# Patient Record
Sex: MALE | Race: WHITE | NOT HISPANIC OR LATINO
[De-identification: names, ages, dates, MRNs, and addresses within clinical notes are randomized per-mention and may not be internally consistent; named-entity substitution may affect disease eponyms.]

---

## 2018-08-24 PROBLEM — Z00.00 ENCOUNTER FOR PREVENTIVE HEALTH EXAMINATION: Status: ACTIVE | Noted: 2018-08-24

## 2018-08-28 ENCOUNTER — LABORATORY RESULT (OUTPATIENT)
Age: 73
End: 2018-08-28

## 2018-08-29 ENCOUNTER — APPOINTMENT (OUTPATIENT)
Dept: CARDIOLOGY | Facility: CLINIC | Age: 73
End: 2018-08-29
Payer: MEDICARE

## 2018-08-29 ENCOUNTER — NON-APPOINTMENT (OUTPATIENT)
Age: 73
End: 2018-08-29

## 2018-08-29 VITALS
DIASTOLIC BLOOD PRESSURE: 82 MMHG | WEIGHT: 212 LBS | HEIGHT: 67 IN | SYSTOLIC BLOOD PRESSURE: 129 MMHG | RESPIRATION RATE: 15 BRPM | BODY MASS INDEX: 33.27 KG/M2 | HEART RATE: 74 BPM

## 2018-08-29 DIAGNOSIS — Z86.39 PERSONAL HISTORY OF OTHER ENDOCRINE, NUTRITIONAL AND METABOLIC DISEASE: ICD-10-CM

## 2018-08-29 PROCEDURE — 93000 ELECTROCARDIOGRAM COMPLETE: CPT

## 2018-08-29 PROCEDURE — 99204 OFFICE O/P NEW MOD 45 MIN: CPT

## 2018-08-31 PROBLEM — Z86.39 HISTORY OF HYPERLIPIDEMIA: Status: RESOLVED | Noted: 2018-08-31 | Resolved: 2018-08-31

## 2018-10-02 ENCOUNTER — APPOINTMENT (OUTPATIENT)
Dept: CARDIOLOGY | Facility: CLINIC | Age: 73
End: 2018-10-02
Payer: MEDICARE

## 2018-10-02 VITALS
HEART RATE: 74 BPM | WEIGHT: 217 LBS | BODY MASS INDEX: 34.06 KG/M2 | HEIGHT: 67 IN | DIASTOLIC BLOOD PRESSURE: 78 MMHG | RESPIRATION RATE: 15 BRPM | SYSTOLIC BLOOD PRESSURE: 124 MMHG

## 2018-10-02 PROCEDURE — 99214 OFFICE O/P EST MOD 30 MIN: CPT

## 2018-10-02 RX ORDER — FAMOTIDINE 20 MG/1
20 TABLET, FILM COATED ORAL DAILY
Refills: 0 | Status: ACTIVE | COMMUNITY
Start: 2018-10-02

## 2018-10-02 RX ORDER — POTASSIUM CHLORIDE 1500 MG/1
20 TABLET, EXTENDED RELEASE ORAL DAILY
Refills: 0 | Status: ACTIVE | COMMUNITY
Start: 2018-10-02

## 2018-10-02 RX ORDER — TORSEMIDE 20 MG/1
20 TABLET ORAL TWICE DAILY
Refills: 0 | Status: ACTIVE | COMMUNITY
Start: 2018-10-02

## 2018-10-02 RX ORDER — DOCUSATE SODIUM 100 MG/1
100 CAPSULE, LIQUID FILLED ORAL TWICE DAILY
Refills: 0 | Status: ACTIVE | COMMUNITY
Start: 2018-10-02

## 2018-10-02 RX ORDER — COLCHICINE 0.6 MG/1
0.6 CAPSULE ORAL DAILY
Refills: 0 | Status: ACTIVE | COMMUNITY
Start: 2018-10-02

## 2018-10-02 RX ORDER — CARVEDILOL 3.12 MG/1
3.12 TABLET, FILM COATED ORAL TWICE DAILY
Refills: 0 | Status: ACTIVE | COMMUNITY
Start: 2018-10-02

## 2018-10-02 RX ORDER — ASPIRIN ENTERIC COATED TABLETS 81 MG 81 MG/1
81 TABLET, DELAYED RELEASE ORAL DAILY
Refills: 0 | Status: ACTIVE | COMMUNITY
Start: 2018-10-02

## 2018-10-02 RX ORDER — ISOSORBIDE MONONITRATE 30 MG/1
30 TABLET, EXTENDED RELEASE ORAL DAILY
Refills: 0 | Status: ACTIVE | COMMUNITY
Start: 2018-10-02

## 2018-10-02 RX ORDER — LEVOTHYROXINE SODIUM 0.03 MG/1
25 TABLET ORAL DAILY
Refills: 0 | Status: ACTIVE | COMMUNITY
Start: 2018-10-02

## 2018-10-02 RX ORDER — HYDRALAZINE HYDROCHLORIDE 10 MG/1
10 TABLET ORAL EVERY 6 HOURS
Refills: 0 | Status: ACTIVE | COMMUNITY
Start: 2018-10-02

## 2018-10-19 ENCOUNTER — APPOINTMENT (OUTPATIENT)
Dept: CARDIOLOGY | Facility: CLINIC | Age: 73
End: 2018-10-19
Payer: MEDICARE

## 2018-10-19 VITALS
BODY MASS INDEX: 32.96 KG/M2 | WEIGHT: 210 LBS | SYSTOLIC BLOOD PRESSURE: 116 MMHG | HEIGHT: 67 IN | RESPIRATION RATE: 16 BRPM | DIASTOLIC BLOOD PRESSURE: 70 MMHG | HEART RATE: 64 BPM

## 2018-10-19 DIAGNOSIS — E78.5 HYPERLIPIDEMIA, UNSPECIFIED: ICD-10-CM

## 2018-10-19 DIAGNOSIS — R01.1 CARDIAC MURMUR, UNSPECIFIED: ICD-10-CM

## 2018-10-19 DIAGNOSIS — Z86.79 OTHER SPECIFIED POSTPROCEDURAL STATES: ICD-10-CM

## 2018-10-19 DIAGNOSIS — I42.9 CARDIOMYOPATHY, UNSPECIFIED: ICD-10-CM

## 2018-10-19 DIAGNOSIS — Z98.890 OTHER SPECIFIED POSTPROCEDURAL STATES: ICD-10-CM

## 2018-10-19 DIAGNOSIS — Z86.79 PERSONAL HISTORY OF OTHER DISEASES OF THE CIRCULATORY SYSTEM: ICD-10-CM

## 2018-10-19 DIAGNOSIS — I48.91 UNSPECIFIED ATRIAL FIBRILLATION: ICD-10-CM

## 2018-10-19 DIAGNOSIS — Z95.810 PRESENCE OF AUTOMATIC (IMPLANTABLE) CARDIAC DEFIBRILLATOR: ICD-10-CM

## 2018-10-19 DIAGNOSIS — I25.10 ATHEROSCLEROTIC HEART DISEASE OF NATIVE CORONARY ARTERY W/OUT ANGINA PECTORIS: ICD-10-CM

## 2018-10-19 DIAGNOSIS — I10 ESSENTIAL (PRIMARY) HYPERTENSION: ICD-10-CM

## 2018-10-19 DIAGNOSIS — I73.9 PERIPHERAL VASCULAR DISEASE, UNSPECIFIED: ICD-10-CM

## 2018-10-19 PROCEDURE — 99214 OFFICE O/P EST MOD 30 MIN: CPT

## 2018-10-19 PROCEDURE — 93306 TTE W/DOPPLER COMPLETE: CPT

## 2018-10-19 PROCEDURE — 93922 UPR/L XTREMITY ART 2 LEVELS: CPT

## 2018-11-01 PROBLEM — Z86.79 HISTORY OF CONGESTIVE HEART FAILURE: Status: ACTIVE | Noted: 2018-08-31

## 2018-11-01 PROBLEM — R01.1 HEART MURMUR: Status: ACTIVE | Noted: 2018-08-31

## 2018-11-01 PROBLEM — I42.9 CARDIOMYOPATHY: Status: ACTIVE | Noted: 2018-08-31

## 2018-11-01 PROBLEM — I48.91 ATRIAL FIBRILLATION: Status: ACTIVE | Noted: 2018-08-31

## 2018-11-01 PROBLEM — I25.10 CORONARY ARTERY DISEASE: Status: ACTIVE | Noted: 2018-08-31

## 2018-12-11 ENCOUNTER — APPOINTMENT (OUTPATIENT)
Dept: CARDIOLOGY | Facility: CLINIC | Age: 73
End: 2018-12-11

## 2019-06-16 PROBLEM — I73.9 CLAUDICATION: Status: ACTIVE | Noted: 2018-10-19

## 2020-06-12 ENCOUNTER — INPATIENT (INPATIENT)
Facility: HOSPITAL | Age: 75
LOS: 13 days | Discharge: NURSING, REHAB OR LONG-TERM CARE | DRG: 208 | End: 2020-06-26
Attending: HOSPITALIST | Admitting: INTERNAL MEDICINE
Payer: COMMERCIAL

## 2020-06-12 VITALS — RESPIRATION RATE: 25 BRPM | OXYGEN SATURATION: 89 %

## 2020-06-12 DIAGNOSIS — E83.52 HYPERCALCEMIA: ICD-10-CM

## 2020-06-12 DIAGNOSIS — E11.9 TYPE 2 DIABETES MELLITUS WITHOUT COMPLICATIONS: ICD-10-CM

## 2020-06-12 DIAGNOSIS — I50.40 UNSPECIFIED COMBINED SYSTOLIC (CONGESTIVE) AND DIASTOLIC (CONGESTIVE) HEART FAILURE: ICD-10-CM

## 2020-06-12 DIAGNOSIS — Z95.0 PRESENCE OF CARDIAC PACEMAKER: Chronic | ICD-10-CM

## 2020-06-12 DIAGNOSIS — J96.02 ACUTE RESPIRATORY FAILURE WITH HYPERCAPNIA: ICD-10-CM

## 2020-06-12 DIAGNOSIS — I25.10 ATHEROSCLEROTIC HEART DISEASE OF NATIVE CORONARY ARTERY WITHOUT ANGINA PECTORIS: ICD-10-CM

## 2020-06-12 DIAGNOSIS — U07.1 COVID-19: ICD-10-CM

## 2020-06-12 DIAGNOSIS — I48.91 UNSPECIFIED ATRIAL FIBRILLATION: ICD-10-CM

## 2020-06-12 DIAGNOSIS — N18.9 CHRONIC KIDNEY DISEASE, UNSPECIFIED: ICD-10-CM

## 2020-06-12 DIAGNOSIS — E78.5 HYPERLIPIDEMIA, UNSPECIFIED: ICD-10-CM

## 2020-06-12 DIAGNOSIS — E03.9 HYPOTHYROIDISM, UNSPECIFIED: ICD-10-CM

## 2020-06-12 DIAGNOSIS — Z29.9 ENCOUNTER FOR PROPHYLACTIC MEASURES, UNSPECIFIED: ICD-10-CM

## 2020-06-12 LAB
ALBUMIN SERPL ELPH-MCNC: 2.8 G/DL — LOW (ref 3.3–5)
ALP SERPL-CCNC: 74 U/L — SIGNIFICANT CHANGE UP (ref 40–120)
ALT FLD-CCNC: 8 U/L — LOW (ref 10–45)
ANION GAP SERPL CALC-SCNC: 6 MMOL/L — SIGNIFICANT CHANGE UP (ref 5–17)
ANION GAP SERPL CALC-SCNC: 8 MMOL/L — SIGNIFICANT CHANGE UP (ref 5–17)
APPEARANCE UR: CLEAR — SIGNIFICANT CHANGE UP
APTT BLD: 38.6 SEC — HIGH (ref 27.5–36.3)
AST SERPL-CCNC: 15 U/L — SIGNIFICANT CHANGE UP (ref 10–40)
BACTERIA # UR AUTO: NEGATIVE /HPF — SIGNIFICANT CHANGE UP
BASOPHILS # BLD AUTO: 0.1 K/UL — SIGNIFICANT CHANGE UP (ref 0–0.2)
BASOPHILS NFR BLD AUTO: 1 % — SIGNIFICANT CHANGE UP (ref 0–2)
BILIRUB SERPL-MCNC: 0.4 MG/DL — SIGNIFICANT CHANGE UP (ref 0.2–1.2)
BILIRUB UR-MCNC: NEGATIVE — SIGNIFICANT CHANGE UP
BUN SERPL-MCNC: 30 MG/DL — HIGH (ref 7–23)
BUN SERPL-MCNC: 31 MG/DL — HIGH (ref 7–23)
CALCIUM SERPL-MCNC: 13.6 MG/DL — CRITICAL HIGH (ref 8.4–10.5)
CALCIUM SERPL-MCNC: 13.8 MG/DL — CRITICAL HIGH (ref 8.4–10.5)
CHLORIDE SERPL-SCNC: 111 MMOL/L — HIGH (ref 96–108)
CHLORIDE SERPL-SCNC: 114 MMOL/L — HIGH (ref 96–108)
CK SERPL-CCNC: 17 U/L — LOW (ref 30–200)
CO2 BLDA-SCNC: 25 MMOL/L — SIGNIFICANT CHANGE UP (ref 22–30)
CO2 BLDA-SCNC: 28 MMOL/L — SIGNIFICANT CHANGE UP (ref 22–30)
CO2 BLDA-SCNC: 31 MMOL/L — HIGH (ref 22–30)
CO2 SERPL-SCNC: 25 MMOL/L — SIGNIFICANT CHANGE UP (ref 22–31)
CO2 SERPL-SCNC: 29 MMOL/L — SIGNIFICANT CHANGE UP (ref 22–31)
COLOR SPEC: YELLOW — SIGNIFICANT CHANGE UP
CREAT SERPL-MCNC: 2.16 MG/DL — HIGH (ref 0.5–1.3)
CREAT SERPL-MCNC: 2.19 MG/DL — HIGH (ref 0.5–1.3)
CRP SERPL-MCNC: 2.99 MG/DL — HIGH (ref 0–0.4)
D DIMER BLD IA.RAPID-MCNC: 464 NG/ML DDU — HIGH
DIFF PNL FLD: NEGATIVE — SIGNIFICANT CHANGE UP
EOSINOPHIL # BLD AUTO: 0 K/UL — SIGNIFICANT CHANGE UP (ref 0–0.5)
EOSINOPHIL NFR BLD AUTO: 0 % — SIGNIFICANT CHANGE UP (ref 0–6)
EPI CELLS # UR: SIGNIFICANT CHANGE UP
FERRITIN SERPL-MCNC: 394 NG/ML — SIGNIFICANT CHANGE UP (ref 30–400)
GAS PNL BLDA: SIGNIFICANT CHANGE UP
GLUCOSE BLDC GLUCOMTR-MCNC: 47 MG/DL — LOW (ref 70–99)
GLUCOSE BLDC GLUCOMTR-MCNC: 76 MG/DL — SIGNIFICANT CHANGE UP (ref 70–99)
GLUCOSE BLDC GLUCOMTR-MCNC: 77 MG/DL — SIGNIFICANT CHANGE UP (ref 70–99)
GLUCOSE BLDC GLUCOMTR-MCNC: 82 MG/DL — SIGNIFICANT CHANGE UP (ref 70–99)
GLUCOSE BLDC GLUCOMTR-MCNC: 88 MG/DL — SIGNIFICANT CHANGE UP (ref 70–99)
GLUCOSE BLDC GLUCOMTR-MCNC: 98 MG/DL — SIGNIFICANT CHANGE UP (ref 70–99)
GLUCOSE SERPL-MCNC: 108 MG/DL — HIGH (ref 70–99)
GLUCOSE SERPL-MCNC: 64 MG/DL — LOW (ref 70–99)
GLUCOSE UR QL: NEGATIVE — SIGNIFICANT CHANGE UP
HCT VFR BLD CALC: 41.2 % — SIGNIFICANT CHANGE UP (ref 39–50)
HGB BLD-MCNC: 11.2 G/DL — LOW (ref 13–17)
HOROWITZ INDEX BLDA+IHG-RTO: SIGNIFICANT CHANGE UP
INR BLD: 1.95 RATIO — HIGH (ref 0.88–1.16)
KETONES UR-MCNC: NEGATIVE — SIGNIFICANT CHANGE UP
LACTATE SERPL-SCNC: 1 MMOL/L — SIGNIFICANT CHANGE UP (ref 0.7–2)
LEUKOCYTE ESTERASE UR-ACNC: NEGATIVE — SIGNIFICANT CHANGE UP
LYMPHOCYTES # BLD AUTO: 0.9 K/UL — LOW (ref 1–3.3)
LYMPHOCYTES # BLD AUTO: 9 % — LOW (ref 13–44)
MACROCYTES BLD QL: SIGNIFICANT CHANGE UP
MAGNESIUM SERPL-MCNC: 1.6 MG/DL — SIGNIFICANT CHANGE UP (ref 1.6–2.6)
MANUAL SMEAR VERIFICATION: SIGNIFICANT CHANGE UP
MCHC RBC-ENTMCNC: 27.2 GM/DL — LOW (ref 32–36)
MCHC RBC-ENTMCNC: 28.7 PG — SIGNIFICANT CHANGE UP (ref 27–34)
MCV RBC AUTO: 105.6 FL — HIGH (ref 80–100)
METAMYELOCYTES # FLD: 1 % — HIGH (ref 0–0)
MONOCYTES # BLD AUTO: 1 K/UL — HIGH (ref 0–0.9)
MONOCYTES NFR BLD AUTO: 10 % — SIGNIFICANT CHANGE UP (ref 2–14)
NEUTROPHILS # BLD AUTO: 7.93 K/UL — HIGH (ref 1.8–7.4)
NEUTROPHILS NFR BLD AUTO: 78 % — HIGH (ref 43–77)
NEUTS BAND # BLD: 1 % — SIGNIFICANT CHANGE UP (ref 0–8)
NITRITE UR-MCNC: NEGATIVE — SIGNIFICANT CHANGE UP
NRBC # BLD: 0 /100 — SIGNIFICANT CHANGE UP (ref 0–0)
NT-PROBNP SERPL-SCNC: HIGH PG/ML (ref 0–300)
PCO2 BLDA: 46 MMHG — SIGNIFICANT CHANGE UP (ref 32–46)
PCO2 BLDA: 51 MMHG — HIGH (ref 32–46)
PCO2 BLDA: >106 MMHG — SIGNIFICANT CHANGE UP (ref 32–46)
PH BLDA: 7 — CRITICAL LOW (ref 7.35–7.45)
PH BLDA: 7.33 — LOW (ref 7.35–7.45)
PH BLDA: 7.33 — LOW (ref 7.35–7.45)
PH UR: 5 — SIGNIFICANT CHANGE UP (ref 5–8)
PHOSPHATE SERPL-MCNC: 3.5 MG/DL — SIGNIFICANT CHANGE UP (ref 2.5–4.5)
PLAT MORPH BLD: NORMAL — SIGNIFICANT CHANGE UP
PLATELET # BLD AUTO: 301 K/UL — SIGNIFICANT CHANGE UP (ref 150–400)
PO2 BLDA: 146 MMHG — HIGH (ref 74–108)
PO2 BLDA: 249 MMHG — HIGH (ref 74–108)
PO2 BLDA: 407 MMHG — HIGH (ref 74–108)
POLYCHROMASIA BLD QL SMEAR: SLIGHT — SIGNIFICANT CHANGE UP
POTASSIUM SERPL-MCNC: 4.8 MMOL/L — SIGNIFICANT CHANGE UP (ref 3.5–5.3)
POTASSIUM SERPL-MCNC: 4.8 MMOL/L — SIGNIFICANT CHANGE UP (ref 3.5–5.3)
POTASSIUM SERPL-SCNC: 4.8 MMOL/L — SIGNIFICANT CHANGE UP (ref 3.5–5.3)
POTASSIUM SERPL-SCNC: 4.8 MMOL/L — SIGNIFICANT CHANGE UP (ref 3.5–5.3)
PROCALCITONIN SERPL-MCNC: 0.24 NG/ML — HIGH
PROT SERPL-MCNC: 7.2 G/DL — SIGNIFICANT CHANGE UP (ref 6–8.3)
PROT UR-MCNC: 30 MG/DL
PROTHROM AB SERPL-ACNC: 22.5 SEC — HIGH (ref 10–12.9)
RBC # BLD: 3.9 M/UL — LOW (ref 4.2–5.8)
RBC # FLD: 19 % — HIGH (ref 10.3–14.5)
RBC BLD AUTO: ABNORMAL
RBC CASTS # UR COMP ASSIST: NEGATIVE /HPF — SIGNIFICANT CHANGE UP (ref 0–4)
SAO2 % BLDA: 99 % — HIGH (ref 92–96)
SAO2 % BLDA: >99 % — HIGH (ref 92–96)
SAO2 % BLDA: >99 % — HIGH (ref 92–96)
SARS-COV-2 RNA SPEC QL NAA+PROBE: DETECTED
SODIUM SERPL-SCNC: 146 MMOL/L — HIGH (ref 135–145)
SODIUM SERPL-SCNC: 147 MMOL/L — HIGH (ref 135–145)
SP GR SPEC: 1.02 — SIGNIFICANT CHANGE UP (ref 1.01–1.02)
TROPONIN I SERPL-MCNC: 0.06 NG/ML — HIGH (ref 0.02–0.06)
TROPONIN I SERPL-MCNC: 0.07 NG/ML — HIGH (ref 0.02–0.06)
UROBILINOGEN FLD QL: NEGATIVE — SIGNIFICANT CHANGE UP
WBC # BLD: 10.04 K/UL — SIGNIFICANT CHANGE UP (ref 3.8–10.5)
WBC # FLD AUTO: 10.04 K/UL — SIGNIFICANT CHANGE UP (ref 3.8–10.5)
WBC UR QL: NEGATIVE /HPF — SIGNIFICANT CHANGE UP (ref 0–5)

## 2020-06-12 PROCEDURE — 31500 INSERT EMERGENCY AIRWAY: CPT

## 2020-06-12 PROCEDURE — 93306 TTE W/DOPPLER COMPLETE: CPT | Mod: 26

## 2020-06-12 PROCEDURE — 99291 CRITICAL CARE FIRST HOUR: CPT | Mod: 25

## 2020-06-12 PROCEDURE — 71045 X-RAY EXAM CHEST 1 VIEW: CPT | Mod: 26

## 2020-06-12 PROCEDURE — 70450 CT HEAD/BRAIN W/O DYE: CPT | Mod: 26

## 2020-06-12 PROCEDURE — 71045 X-RAY EXAM CHEST 1 VIEW: CPT | Mod: 26,77

## 2020-06-12 RX ORDER — IPRATROPIUM BROMIDE 0.2 MG/ML
2 SOLUTION, NON-ORAL INHALATION EVERY 6 HOURS
Refills: 0 | Status: DISCONTINUED | OUTPATIENT
Start: 2020-06-12 | End: 2020-06-26

## 2020-06-12 RX ORDER — ALBUTEROL 90 UG/1
2 AEROSOL, METERED ORAL EVERY 6 HOURS
Refills: 0 | Status: DISCONTINUED | OUTPATIENT
Start: 2020-06-12 | End: 2020-06-26

## 2020-06-12 RX ORDER — SODIUM CHLORIDE 9 MG/ML
1000 INJECTION INTRAMUSCULAR; INTRAVENOUS; SUBCUTANEOUS ONCE
Refills: 0 | Status: COMPLETED | OUTPATIENT
Start: 2020-06-12 | End: 2020-06-12

## 2020-06-12 RX ORDER — INSULIN LISPRO 100/ML
VIAL (ML) SUBCUTANEOUS EVERY 6 HOURS
Refills: 0 | Status: DISCONTINUED | OUTPATIENT
Start: 2020-06-12 | End: 2020-06-17

## 2020-06-12 RX ORDER — APIXABAN 2.5 MG/1
5 TABLET, FILM COATED ORAL EVERY 12 HOURS
Refills: 0 | Status: DISCONTINUED | OUTPATIENT
Start: 2020-06-12 | End: 2020-06-26

## 2020-06-12 RX ORDER — DEXTROSE 50 % IN WATER 50 %
25 SYRINGE (ML) INTRAVENOUS ONCE
Refills: 0 | Status: COMPLETED | OUTPATIENT
Start: 2020-06-12 | End: 2020-06-12

## 2020-06-12 RX ORDER — DEXTROSE 50 % IN WATER 50 %
25 SYRINGE (ML) INTRAVENOUS ONCE
Refills: 0 | Status: DISCONTINUED | OUTPATIENT
Start: 2020-06-12 | End: 2020-06-26

## 2020-06-12 RX ORDER — FERROUS SULFATE 325(65) MG
0 TABLET ORAL
Qty: 0 | Refills: 0 | DISCHARGE

## 2020-06-12 RX ORDER — OMEPRAZOLE 10 MG/1
1 CAPSULE, DELAYED RELEASE ORAL
Qty: 0 | Refills: 0 | DISCHARGE

## 2020-06-12 RX ORDER — MIDODRINE HYDROCHLORIDE 2.5 MG/1
10 TABLET ORAL THREE TIMES A DAY
Refills: 0 | Status: DISCONTINUED | OUTPATIENT
Start: 2020-06-12 | End: 2020-06-15

## 2020-06-12 RX ORDER — PANTOPRAZOLE SODIUM 20 MG/1
40 TABLET, DELAYED RELEASE ORAL ONCE
Refills: 0 | Status: COMPLETED | OUTPATIENT
Start: 2020-06-12 | End: 2020-06-12

## 2020-06-12 RX ORDER — SENNA PLUS 8.6 MG/1
1 TABLET ORAL
Qty: 0 | Refills: 0 | DISCHARGE

## 2020-06-12 RX ORDER — HYDROXYCHLOROQUINE SULFATE 200 MG
TABLET ORAL
Refills: 0 | Status: DISCONTINUED | OUTPATIENT
Start: 2020-06-12 | End: 2020-06-12

## 2020-06-12 RX ORDER — FAMOTIDINE 10 MG/ML
20 INJECTION INTRAVENOUS DAILY
Refills: 0 | Status: DISCONTINUED | OUTPATIENT
Start: 2020-06-12 | End: 2020-06-17

## 2020-06-12 RX ORDER — DEXTROSE 50 % IN WATER 50 %
12.5 SYRINGE (ML) INTRAVENOUS ONCE
Refills: 0 | Status: DISCONTINUED | OUTPATIENT
Start: 2020-06-12 | End: 2020-06-26

## 2020-06-12 RX ORDER — FUROSEMIDE 40 MG
40 TABLET ORAL ONCE
Refills: 0 | Status: COMPLETED | OUTPATIENT
Start: 2020-06-12 | End: 2020-06-12

## 2020-06-12 RX ORDER — SODIUM CHLORIDE 9 MG/ML
1000 INJECTION, SOLUTION INTRAVENOUS
Refills: 0 | Status: DISCONTINUED | OUTPATIENT
Start: 2020-06-12 | End: 2020-06-14

## 2020-06-12 RX ORDER — SUCCINYLCHOLINE CHLORIDE 100 MG/5ML
100 SYRINGE (ML) INTRAVENOUS ONCE
Refills: 0 | Status: COMPLETED | OUTPATIENT
Start: 2020-06-12 | End: 2020-06-12

## 2020-06-12 RX ORDER — CALCITONIN SALMON 200 [IU]/ML
340 INJECTION, SOLUTION INTRAMUSCULAR EVERY 12 HOURS
Refills: 0 | Status: COMPLETED | OUTPATIENT
Start: 2020-06-12 | End: 2020-06-13

## 2020-06-12 RX ORDER — ALBUTEROL 90 UG/1
2 AEROSOL, METERED ORAL
Qty: 0 | Refills: 0 | DISCHARGE

## 2020-06-12 RX ORDER — CEFTRIAXONE 500 MG/1
1000 INJECTION, POWDER, FOR SOLUTION INTRAMUSCULAR; INTRAVENOUS ONCE
Refills: 0 | Status: COMPLETED | OUTPATIENT
Start: 2020-06-12 | End: 2020-06-12

## 2020-06-12 RX ORDER — FEBUXOSTAT 40 MG/1
1 TABLET ORAL
Qty: 0 | Refills: 0 | DISCHARGE

## 2020-06-12 RX ORDER — ACETAMINOPHEN 500 MG
650 TABLET ORAL EVERY 6 HOURS
Refills: 0 | Status: DISCONTINUED | OUTPATIENT
Start: 2020-06-12 | End: 2020-06-26

## 2020-06-12 RX ORDER — FUROSEMIDE 40 MG
40 TABLET ORAL ONCE
Refills: 0 | Status: DISCONTINUED | OUTPATIENT
Start: 2020-06-12 | End: 2020-06-12

## 2020-06-12 RX ORDER — SODIUM CHLORIDE 9 MG/ML
1000 INJECTION, SOLUTION INTRAVENOUS
Refills: 0 | Status: DISCONTINUED | OUTPATIENT
Start: 2020-06-12 | End: 2020-06-26

## 2020-06-12 RX ORDER — ETOMIDATE 2 MG/ML
20 INJECTION INTRAVENOUS ONCE
Refills: 0 | Status: COMPLETED | OUTPATIENT
Start: 2020-06-12 | End: 2020-06-12

## 2020-06-12 RX ORDER — LEVOTHYROXINE SODIUM 125 MCG
25 TABLET ORAL DAILY
Refills: 0 | Status: DISCONTINUED | OUTPATIENT
Start: 2020-06-12 | End: 2020-06-26

## 2020-06-12 RX ORDER — CHLORHEXIDINE GLUCONATE 213 G/1000ML
15 SOLUTION TOPICAL EVERY 12 HOURS
Refills: 0 | Status: DISCONTINUED | OUTPATIENT
Start: 2020-06-12 | End: 2020-06-14

## 2020-06-12 RX ORDER — CHLORHEXIDINE GLUCONATE 213 G/1000ML
1 SOLUTION TOPICAL
Refills: 0 | Status: DISCONTINUED | OUTPATIENT
Start: 2020-06-12 | End: 2020-06-19

## 2020-06-12 RX ADMIN — CALCITONIN SALMON 340 INTERNATIONAL UNIT(S): 200 INJECTION, SOLUTION INTRAMUSCULAR at 22:33

## 2020-06-12 RX ADMIN — CHLORHEXIDINE GLUCONATE 15 MILLILITER(S): 213 SOLUTION TOPICAL at 17:46

## 2020-06-12 RX ADMIN — MIDODRINE HYDROCHLORIDE 10 MILLIGRAM(S): 2.5 TABLET ORAL at 13:42

## 2020-06-12 RX ADMIN — ETOMIDATE 20 MILLIGRAM(S): 2 INJECTION INTRAVENOUS at 09:06

## 2020-06-12 RX ADMIN — CEFTRIAXONE 100 MILLIGRAM(S): 500 INJECTION, POWDER, FOR SOLUTION INTRAMUSCULAR; INTRAVENOUS at 08:23

## 2020-06-12 RX ADMIN — FAMOTIDINE 20 MILLIGRAM(S): 10 INJECTION INTRAVENOUS at 13:42

## 2020-06-12 RX ADMIN — Medication 100 MILLIGRAM(S): at 09:25

## 2020-06-12 RX ADMIN — Medication 40 MILLIGRAM(S): at 13:42

## 2020-06-12 RX ADMIN — Medication 2 PUFF(S): at 21:48

## 2020-06-12 RX ADMIN — PANTOPRAZOLE SODIUM 40 MILLIGRAM(S): 20 TABLET, DELAYED RELEASE ORAL at 13:43

## 2020-06-12 RX ADMIN — Medication 100 MILLIGRAM(S): at 09:07

## 2020-06-12 RX ADMIN — MIDODRINE HYDROCHLORIDE 10 MILLIGRAM(S): 2.5 TABLET ORAL at 17:46

## 2020-06-12 RX ADMIN — Medication 2 PUFF(S): at 15:38

## 2020-06-12 RX ADMIN — ALBUTEROL 2 PUFF(S): 90 AEROSOL, METERED ORAL at 21:47

## 2020-06-12 RX ADMIN — APIXABAN 5 MILLIGRAM(S): 2.5 TABLET, FILM COATED ORAL at 17:46

## 2020-06-12 RX ADMIN — SODIUM CHLORIDE 1000 MILLILITER(S): 9 INJECTION INTRAMUSCULAR; INTRAVENOUS; SUBCUTANEOUS at 09:33

## 2020-06-12 RX ADMIN — SODIUM CHLORIDE 1000 MILLILITER(S): 9 INJECTION INTRAMUSCULAR; INTRAVENOUS; SUBCUTANEOUS at 08:19

## 2020-06-12 RX ADMIN — Medication 25 GRAM(S): at 18:22

## 2020-06-12 RX ADMIN — APIXABAN 5 MILLIGRAM(S): 2.5 TABLET, FILM COATED ORAL at 13:42

## 2020-06-12 RX ADMIN — Medication 110 MILLIGRAM(S): at 08:25

## 2020-06-12 RX ADMIN — CALCITONIN SALMON 340 INTERNATIONAL UNIT(S): 200 INJECTION, SOLUTION INTRAMUSCULAR at 15:57

## 2020-06-12 RX ADMIN — ALBUTEROL 2 PUFF(S): 90 AEROSOL, METERED ORAL at 15:37

## 2020-06-12 RX ADMIN — CEFTRIAXONE 1000 MILLIGRAM(S): 500 INJECTION, POWDER, FOR SOLUTION INTRAMUSCULAR; INTRAVENOUS at 08:25

## 2020-06-12 NOTE — H&P ADULT - NSHPSOCIALHISTORY_GEN_ALL_CORE
Unknown history of tobacco/ETOH/illicit drug usage Unable to obtain history of tobacco/ETOH/illicit drug usage at this time

## 2020-06-12 NOTE — H&P ADULT - PROBLEM SELECTOR PLAN 3
Son states baseline creatinine 1.6, continue to monitor electrolytes and creatinine, Ne[place] for strict intake and output, consult renal

## 2020-06-12 NOTE — ED PROVIDER NOTE - CARE PLAN
Principal Discharge DX:	Acute respiratory failure due to COVID-19  Secondary Diagnosis:	Acute respiratory failure with hypoxia and hypercarbia

## 2020-06-12 NOTE — ED ADULT NURSE REASSESSMENT NOTE - NS ED NURSE REASSESS COMMENT FT1
O2 sat decreased to 50%, BVM in progress. Etomidate 20mg and succ 100mg given per MD order. O2 sat decreased to 50%, BVM in initiated and increased to 100%. Etomidate 20mg and succ 100mg given per MD order.

## 2020-06-12 NOTE — H&P ADULT - PROBLEM SELECTOR PLAN 8
On Eliquis, Protonix daily  Bedrest, start TF via OGT  Full Code status  Román Olsen updated by phone [phone]

## 2020-06-12 NOTE — ED ADULT NURSE REASSESSMENT NOTE - NS ED NURSE REASSESS COMMENT FT1
Pt returned from CT, O2 sat dropped to 59% on 2L NC, Dr Lamas informed. Placed pt on NRB and nasal tumpet placed in right nostril, O2 increased to 99%.

## 2020-06-12 NOTE — H&P ADULT - PROBLEM SELECTOR PLAN 1
Uncertain if related to acute heart failure or COVID viral pneumonia, bacterial pneumonia less likely with no fever or leukocytosis.  Diurese to maintain net negative fluid balance, check serial troponin, BNP and echocardiogram.  Trend fever curve, WBC count.  Wean ventilator as tolerated

## 2020-06-12 NOTE — AIRWAY PLACEMENT NOTE ADULT - AIRWAY COMMENTS:
pt intubated by dr rowley with 7.5 ETT 23 at [facility], and secured in trauma 1 placed on LTV 2 vent AC 30/450/100%/+5

## 2020-06-12 NOTE — PROGRESS NOTE ADULT - ASSESSMENT
ASSESSMENT:    75M w/ PMH listed below, arrived from [facility] with AMS. In ED ABG revealed severe hypercapneic resp. failure (pH 7.00 w/ PCO2 >100), patient was inubated and place don mechincal ventialtion. Of note patient also tested COVID-19 (+).     Events last 24 hours:   -patient remains intubated and on full vent support  -he has not received any sedation since intubation in ED, and remains slow to wake up. With stimulation, patient will open eyes, spontaneous movement of legs noted.    PLAN:    #Hypercapenic Resp. Failure  -Patient currently on Full vent support  -titrate settings to maintain SaO2 >90%, or pH >7.25  -consider low tidal volume ventilation strategy w/ goal Tv 4-6 cc/kg of ideal body weight  -plateu pressure goal <30  -Peridex oral care  -aggressive pulmonary toilet  -daily  spontaneous breathing trial if clinical condition warrants, discuss with respiratory therapy     #COVID-19 (+)  -per note review, day team discussed with family and he has been persistently (+) since start of pandemic  -maintain airborne/contact isolation precautions    #AMS  -opens eyes to voice, moves legs, but not interactive  -did have CT on ED arrival  -will hold sedation and assess mental status overnight  -if remains altered in Am would consider repeat head CT    #Acute on Chronic Renal failure   -renal is following  -wilhelm with Q1H uop assessment  -renal dose meds joon void nephrotoxins    #Hypercalcemia   -hypercalcemia work up ordered by renal attending  -may need further iamging if remains high.

## 2020-06-12 NOTE — H&P ADULT - HISTORY OF PRESENT ILLNESS
75 year old male with extensive PMH including CAD, systolic/diastolic heart failure status post AICD, atrial fibrillation status post cardioversion, COPD, type 2 DM, CKD, dyslipidemia, hypothyroidism who presented to [facility] from [facility] with altered mental status.  ABG revealed acute hypercarbic respiratory failure and patient was intubated, COVID positive.  Transferred to ICU for further care.    Currently intubated and cannot elicit further history from patient.  Of note, son spoke to patient via phone yesterday and noted he seemed short of breath at rest with difficulty completing sentences. 75 year old male with extensive PMH including CAD, systolic/diastolic heart failure status post AICD, atrial fibrillation status post cardioversion, COPD, type 2 DM, CKD, dyslipidemia, hypothyroidism who presented to [facility] from [facility] with altered mental status.  ABG revealed acute hypercarbic respiratory failure and patient was intubated, COVID positive.  Transferred to ICU for further care.    Currently intubated and cannot elicit further history from patient.  Of note, son spoke to patient via phone yesterday and noted he seemed short of breath at rest with difficulty completing sentences, patient last intubated 11/19 because "he had heart failure and his carbon dioxide levels went very high," patient has been alternately hospitalized or at rehab since that time. 75 year old male with extensive PMH including CAD, systolic/diastolic heart failure status post AICD, atrial fibrillation status post cardioversion, COPD, type 2 DM, CKD, hypothyroidism [facility] with altered mental status.  ABG revealed acute hypercarbic respiratory failure and patient was intubated, COVID positive.  Transferred to ICU for further care.    Currently intubated and cannot elicit further history from patient.  Of note, son spoke to patient via phone yesterday and noted he seemed short of breath at rest with difficulty completing sentences, patient last intubated 11/19 because "he had heart failure and his carbon dioxide levels went very high," patient has been alternately hospitalized or at rehab since that time.

## 2020-06-12 NOTE — ED ADULT NURSE NOTE - OBJECTIVE STATEMENT
Pt presents to ED from [facility] for altered mental status. According to EMS, pt was at baseline mental status last night and was found unresponsive this morning. Pt presents lethargic and minimally responsive to painful stimuli. Tachypneic, O2 via NC initiated. Mild edema to extremities. Pt with history of covid, last swab on 6/4/20 with negative result.

## 2020-06-12 NOTE — H&P ADULT - ASSESSMENT
75 year old male with extensive PMH including CAD, systolic/diastolic heart failure status post AICD, atrial fibrillation status post cardioversion, COPD, type 2 DM, CKD, dyslipidemia, hypothyroidism who presenting from [facility] with acute hypercarbic respiratory failure requiring intubation. 75 year old male with extensive PMH including CAD, systolic/diastolic heart failure status post AICD, atrial fibrillation status post cardioversion, COPD, type 2 DM, CKD, hypothyroidism who presenting from [facility] with acute hypercarbic respiratory failure requiring intubation.

## 2020-06-12 NOTE — PROGRESS NOTE ADULT - SUBJECTIVE AND OBJECTIVE BOX
Patient is a 75y old  Male who presents with a chief complaint of Respiratory failure (2020 14:03)      BRIEF HOSPITAL COURSE:     75M w/ PMH listed below, arrived from [facility] with AMS. In ED ABG revealed severe hypercapneic resp. failure (pH 7.00 w/ PCO2 >100), patient was inubated and place don mechincal ventialtion. Of note patient also tested COVID-19 (+).     Events last 24 hours:   -patient remains intubated and on full vent support  -he has not received any sedation since intubation in ED, and remains slow to wake up. With stimulation, patient will open eyes, spontaneous movement of legs noted.    PAST MEDICAL & SURGICAL HISTORY:  GERD (gastroesophageal reflux disease)  Chronic kidney disease (CKD): Baseline creatinine 1.6  Type 2 diabetes mellitus  Heart failure, systolic and diastolic  Sciatica  Atrial fibrillation: Status post cardioversion  HTN (hypertension)  Neuropathy  Hypothyroid  COVID-19  Acute kidney failure  Artificial pacemaker      Review of Systems:  -unable to obtain as patient remains sedated, not interactive    Medications:    midodrine. 10 milliGRAM(s) Oral three times a day    ALBUTerol    90 MICROgram(s) HFA Inhaler 2 Puff(s) Inhalation every 6 hours  ipratropium 17 MICROgram(s) HFA Inhaler 2 Puff(s) Inhalation every 6 hours    acetaminophen    Suspension .. 650 milliGRAM(s) Oral every 6 hours PRN      apixaban 5 milliGRAM(s) Oral every 12 hours    famotidine    Tablet 20 milliGRAM(s) Oral daily      calcitonin Injectable 340 International Unit(s) IntraMuscular every 12 hours  dextrose 50% Injectable 12.5 Gram(s) IV Push once  dextrose 50% Injectable 25 Gram(s) IV Push once  dextrose 50% Injectable 25 Gram(s) IV Push once  insulin lispro (HumaLOG) corrective regimen sliding scale   SubCutaneous every 6 hours  levothyroxine 25 MICROGram(s) Oral daily    dextrose 5%. 1000 milliLiter(s) IV Continuous <Continuous>  dextrose 5%. 1000 milliLiter(s) IV Continuous <Continuous>      chlorhexidine 0.12% Liquid 15 milliLiter(s) Oral Mucosa every 12 hours  chlorhexidine 4% Liquid 1 Application(s) Topical <User Schedule>        Mode: AC/ CMV (Assist Control/ Continuous Mandatory Ventilation)  RR (machine): 30  TV (machine): 450  FiO2: 40  PEEP: 5  MAP: 15  PIP: 31      ICU Vital Signs Last 24 Hrs  T(C): 37.4 (2020 18:00), Max: 37.4 (2020 18:00)  T(F): 99.3 (2020 18:00), Max: 99.3 (2020 18:00)  HR: 84 (2020 21:48) (72 - 90)  BP: 126/73 (2020 21:00) (94/62 - 132/76)  BP(mean): 88 (2020 21:00) (70 - 96)  RR: 27 (2020 21:00) (19 - 40)  SpO2: 100% (2020 21:48) (73% - 100%)      ABG - ( 2020 11:55 )  pH, Arterial: 7.33  pH, Blood: x     /  pCO2: 51    /  pO2: 146   / HCO3: x     / Base Excess: x     /  SaO2: >99                 I&O's Detail    2020 07:01  -  2020 22:02  --------------------------------------------------------  IN:    Enteral Tube Flush: 60 mL    Glucerna 1.5: 20 mL    Solution: 50 mL  Total IN: 130 mL    OUT:    Intermittent Catheterization - Urethral: 1400 mL  Total OUT: 1400 mL    Total NET: -1270 mL            LABS:                        11.2   10.04 )-----------( 301      ( 2020 08:00 )             41.2     06-12    147<H>  |  114<H>  |  31<H>  ----------------------------<  64<L>  4.8   |  25  |  2.16<H>    Ca    13.6<HH>      2020 13:30  Phos  3.5     06-12  Mg     1.6     06-12    TPro  7.2  /  Alb  2.8<L>  /  TBili  0.4  /  DBili  x   /  AST  15  /  ALT  8<L>  /  AlkPhos  74  06-12      CARDIAC MARKERS ( 2020 16:57 )  .069 ng/mL / x     / x     / x     / x      CARDIAC MARKERS ( 2020 08:00 )  .063 ng/mL / x     / 17 U/L / x     / x          CAPILLARY BLOOD GLUCOSE      POCT Blood Glucose.: 98 mg/dL (2020 18:16)    PT/INR - ( 2020 08:00 )   PT: 22.5 sec;   INR: 1.95 ratio         PTT - ( 2020 08:00 )  PTT:38.6 sec  Urinalysis Basic - ( 2020 08:50 )    Color: Yellow / Appearance: Clear / S.025 / pH: x  Gluc: x / Ketone: Negative  / Bili: Negative / Urobili: Negative   Blood: x / Protein: 30 mg/dL / Nitrite: Negative   Leuk Esterase: Negative / RBC: Negative /HPF / WBC Negative /HPF   Sq Epi: x / Non Sq Epi: Neg.-Few / Bacteria: Negative /HPF      CULTURES:      Physical Examination:    General: Intubated, lethargic    HEENT: Pupils equal, reactive to light.  Symmetric.    PULM: Clear to auscultation bilaterally, no significant sputum production    CVS: Regular rate and rhythm, no murmurs, rubs, or gallops    ABD: Soft, nondistended, nontender, normoactive bowel sounds, no masses    EXT: No edema, nontender    SKIN: Warm and well perfused, no rashes noted.    RADIOLOGY:     < from: CT Head No Cont (20 @ 08:21) >  IMPRESSION:   Moderate periventricular white matter ischemia. Global atrophy. 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    < end of copied text >      CRITICAL CARE TIME SPENT:  35 minutes of critical care time spent providing medical care for patient's acute illness/conditions that impairs at least one vital organ system and/or poses a high risk of imminent or life threatening deterioration in the patient's condition. It includes time spent evaluating and treating the patient's acute illness as well as time spent reviewing labs, radiology, discussing goals of care with patient and/or patient's family, and discussing the case with a multidisciplinary team, including the eICU, in an effort to prevent further life threatening deterioration or end organ damage. This time is independent of any procedures performed.

## 2020-06-12 NOTE — ED ADULT TRIAGE NOTE - CHIEF COMPLAINT QUOTE
BIB EMS from [facility] for lethargy and alter mental status. BIB EMS from [facility] for alter mental status and lethargy. Per EMS finger stick was 180 mg/dl PMHX: Diabetes BIB EMS from [facility] for alter mental status and lethargy.  Per EMS finger stick was 180 mg/dl PMHX: Diabetes

## 2020-06-12 NOTE — ED PROVIDER NOTE - CLINICAL SUMMARY MEDICAL DECISION MAKING FREE TEXT BOX
75 y m covid + unresponsive bilateral pneumonia in hypercarbic hypoxic resp failure intubated in ed abx for pneumonia , icu admit

## 2020-06-12 NOTE — CONSULT NOTE ADULT - SUBJECTIVE AND OBJECTIVE BOX
NEPHROLOGY CONSULTATION    CHIEF COMPLAINT: AMS    HPI:  Pt is 75 year old male with extensive PMH including CAD, systolic/diastolic heart failure status post AICD, atrial fibrillation status post cardioversion, COPD, type 2 DM, CKD 3 (Cr 1.97 - 10/16/19), hypothyroidism who presented to [facility] from [facility] with altered mental status. ABG revealed acute hypercarbic respiratory failure and patient was intubated, COVID positive. Adm to ICU for further care. Asked to eval for REY/CKD. Hx from chart as pt is unable to provide hx or ROS in setting of above.     ROS:  as above    Allergies:  No Known Allergies    PAST MEDICAL & SURGICAL HISTORY:  GERD (gastroesophageal reflux disease)  Chronic kidney disease (CKD): Baseline creatinine 1.6  Type 2 diabetes mellitus  Heart failure, systolic and diastolic  Sciatica  Atrial fibrillation: Status post cardioversion  HTN (hypertension)  Neuropathy  Hypothyroid  COVID-19  Acute kidney failure  Artificial pacemaker    SOCIAL HISTORY:  negative    FAMILY HISTORY:  NC    MEDICATIONS  (STANDING):  ALBUTerol    90 MICROgram(s) HFA Inhaler 2 Puff(s) Inhalation every 6 hours  apixaban 5 milliGRAM(s) Oral every 12 hours  chlorhexidine 0.12% Liquid 15 milliLiter(s) Oral Mucosa every 12 hours  chlorhexidine 4% Liquid 1 Application(s) Topical <User Schedule>  dextrose 5%. 1000 milliLiter(s) (50 mL/Hr) IV Continuous <Continuous>  dextrose 50% Injectable 12.5 Gram(s) IV Push once  dextrose 50% Injectable 25 Gram(s) IV Push once  dextrose 50% Injectable 25 Gram(s) IV Push once  famotidine    Tablet 20 milliGRAM(s) Oral daily  insulin lispro (HumaLOG) corrective regimen sliding scale   SubCutaneous every 6 hours  ipratropium 17 MICROgram(s) HFA Inhaler 2 Puff(s) Inhalation every 6 hours  levothyroxine 25 MICROGram(s) Oral daily  midodrine. 10 milliGRAM(s) Oral three times a day    Vital Signs Last 24 Hrs  T(C): 36.1 (20 @ 07:31), Max: 36.1 (20 @ 07:31)  T(F): 96.9 (20 @ 07:31), Max: 96.9 (20 @ 07:31)  HR: 77 (20 @ 11:44) (75 - 90)  BP: 127/70 (20 @ 09:50) (94/62 - 127/70)  BP(mean): 85 (20 @ 09:50) (70 - 92)  RR: 23 (20 @ 09:50) (19 - 40)  SpO2: 100% (20 @ 11:44) (73% - 100%)    s1s2  coarse BS  soft  edema    LABS:                        11.2   10.04 )-----------( 301      ( 2020 08:00 )             41.2     -    146<H>  |  111<H>  |  30<H>  ----------------------------<  108<H>  4.8   |  29  |  2.19<H>    Ca    13.8<HH>      2020 08:00    TPro  7.2  /  Alb  2.8<L>  /  TBili  0.4  /  DBili  x   /  AST  15  /  ALT  8<L>  /  AlkPhos  74  06-12    Urinalysis Basic - ( 2020 08:50 )    Color: Yellow / Appearance: Clear / S.025 / pH: x  Gluc: x / Ketone: Negative  / Bili: Negative / Urobili: Negative   Blood: x / Protein: 30 mg/dL / Nitrite: Negative   Leuk Esterase: Negative / RBC: Negative /HPF / WBC Negative /HPF   Sq Epi: x / Non Sq Epi: Neg.-Few / Bacteria: Negative /HPF    LIVER FUNCTIONS - ( 2020 08:00 )  Alb: 2.8 g/dL / Pro: 7.2 g/dL / ALK PHOS: 74 U/L / ALT: 8 U/L / AST: 15 U/L / GGT: x           PT/INR - ( 2020 08:00 )   PT: 22.5 sec;   INR: 1.95 ratio       PTT - ( 2020 08:00 )  PTT:38.6 sec    A/P: NEPHROLOGY CONSULTATION    CHIEF COMPLAINT: AMS    HPI:  Pt is 75 year old male with extensive PMH including CAD, systolic/diastolic heart failure status post AICD, atrial fibrillation status post cardioversion, COPD, type 2 DM, CKD 3 (Cr 1.97 - 10/16/19), hypothyroidism who presented to [facility] from [facility] with altered mental status. ABG revealed acute hypercarbic respiratory failure and patient was intubated, COVID positive. Adm to ICU for further care. Asked to eval for REY/CKD. Hx from chart as pt is unable to provide hx or ROS in setting of above. Noted to have hypercalcemia.     ROS:  as above    Allergies:  No Known Allergies    PAST MEDICAL & SURGICAL HISTORY:  GERD (gastroesophageal reflux disease)  Chronic kidney disease (CKD): Baseline creatinine 1.6  Type 2 diabetes mellitus  Heart failure, systolic and diastolic  Sciatica  Atrial fibrillation: Status post cardioversion  HTN (hypertension)  Neuropathy  Hypothyroid  COVID-19  Acute kidney failure  Artificial pacemaker    SOCIAL HISTORY:  negative    FAMILY HISTORY:  NC    MEDICATIONS  (STANDING):  ALBUTerol    90 MICROgram(s) HFA Inhaler 2 Puff(s) Inhalation every 6 hours  apixaban 5 milliGRAM(s) Oral every 12 hours  chlorhexidine 0.12% Liquid 15 milliLiter(s) Oral Mucosa every 12 hours  chlorhexidine 4% Liquid 1 Application(s) Topical <User Schedule>  dextrose 5%. 1000 milliLiter(s) (50 mL/Hr) IV Continuous <Continuous>  dextrose 50% Injectable 12.5 Gram(s) IV Push once  dextrose 50% Injectable 25 Gram(s) IV Push once  dextrose 50% Injectable 25 Gram(s) IV Push once  famotidine    Tablet 20 milliGRAM(s) Oral daily  insulin lispro (HumaLOG) corrective regimen sliding scale   SubCutaneous every 6 hours  ipratropium 17 MICROgram(s) HFA Inhaler 2 Puff(s) Inhalation every 6 hours  levothyroxine 25 MICROGram(s) Oral daily  midodrine. 10 milliGRAM(s) Oral three times a day    Vital Signs Last 24 Hrs  T(C): 36.1 (20 @ 07:31), Max: 36.1 (20 @ 07:31)  T(F): 96.9 (20 @ 07:31), Max: 96.9 (20 @ 07:31)  HR: 77 (20 @ 11:44) (75 - 90)  BP: 127/70 (20 @ 09:50) (94/62 - 127/70)  BP(mean): 85 (20 @ 09:50) (70 - 92)  RR: 23 (20 @ 09:50) (19 - 40)  SpO2: 100% (20 @ 11:44) (73% - 100%)    I&O's Detail    2020 07:01  -  2020 19:07  --------------------------------------------------------  IN:    Enteral Tube Flush: 60 mL    Glucerna 1.5: 20 mL    Solution: 50 mL  Total IN: 130 mL    OUT:    Intermittent Catheterization - Urethral: 1400 mL  Total OUT: 1400 mL    Total NET: -1270 mL    s1s2  coarse BS  soft  sm edema    LABS:                        11.2   10.04 )-----------( 301      ( 2020 08:00 )             41.2     06-12    146<H>  |  111<H>  |  30<H>  ----------------------------<  108<H>  4.8   |  29  |  2.19<H>    Ca    13.8<HH>      2020 08:00    TPro  7.2  /  Alb  2.8<L>  /  TBili  0.4  /  DBili  x   /  AST  15  /  ALT  8<L>  /  AlkPhos  74  06-12    Urinalysis Basic - ( 2020 08:50 )    Color: Yellow / Appearance: Clear / S.025 / pH: x  Gluc: x / Ketone: Negative  / Bili: Negative / Urobili: Negative   Blood: x / Protein: 30 mg/dL / Nitrite: Negative   Leuk Esterase: Negative / RBC: Negative /HPF / WBC Negative /HPF   Sq Epi: x / Non Sq Epi: Neg.-Few / Bacteria: Negative /HPF    LIVER FUNCTIONS - ( 2020 08:00 )  Alb: 2.8 g/dL / Pro: 7.2 g/dL / ALK PHOS: 74 U/L / ALT: 8 U/L / AST: 15 U/L / GGT: x           PT/INR - ( 2020 08:00 )   PT: 22.5 sec;   INR: 1.95 ratio       PTT - ( 2020 08:00 )  PTT:38.6 sec    A/P:    Severe CM, EF 25-30%, mod TR/AS  Resp failure, CHF/COPD exacerbation, COVID +, intubated   Renal fx is close to prior baseline  Not oliguric  [place], I/Os  Avoid nephrotoxins  Hypercalcemia, etiology not clear  Will order PTH, PTHrP, SPEP, vit D level  Will follow  D/w ICU team    [phone]

## 2020-06-12 NOTE — ED ADULT NURSE NOTE - PMH
Acute kidney failure    Acute respiratory failure    Atrial fibrillation    Bacteremia    COVID-19    Dehydration    Diabetes    Heart failure    HLD (hyperlipidemia)    HTN (hypertension)    Hypokalemia    Hypothyroid    Neuropathy    Sciatica

## 2020-06-12 NOTE — H&P ADULT - NSHPPHYSICALEXAM_GEN_ALL_CORE
Vital Signs Last 24 Hrs  T(C): 36.1 (12 Jun 2020 07:31), Max: 36.1 (12 Jun 2020 07:31)  T(F): 96.9 (12 Jun 2020 07:31), Max: 96.9 (12 Jun 2020 07:31)  HR: 79 (12 Jun 2020 09:50) (75 - 90)  BP: 127/70 (12 Jun 2020 09:50) (94/62 - 127/70)  BP(mean): 85 (12 Jun 2020 09:50) (70 - 92)  RR: 23 (12 Jun 2020 09:50) (19 - 40)  SpO2: 100% (12 Jun 2020 09:50) (73% - 100%)  AC 30 450 50% +5    Physical Exam  Gen:  WN/WD Male resting in bed, NAD  ENT:  Orally intubated NC/AT, no JVD noted  Thorax:  Symmetric, no retractions  Lung:  Scattered rhonchi throughout  CV:  S1, S2. RRR  Abd:  Soft, NT/ND.  BS normoactive, no masses to palp  Extrem:  No C/C/E, DP/radial pulses +2  Neuro:  A&O x 3, no gross motor/sensory deficits English

## 2020-06-12 NOTE — ED ADULT NURSE NOTE - CHIEF COMPLAINT QUOTE
BIB EMS from [facility] for alter mental status and lethargy.  Per EMS finger stick was 180 mg/dl PMHX: Diabetes

## 2020-06-12 NOTE — H&P ADULT - ATTENDING COMMENTS
Assessment:  1. Acute hypercapnic respiratory failure  2. Heart failure with reduced EF  3. REY on CKD stage 4  4. Hypercalcemia   5. Afib  6. DM type 2  7. Hypothyroidism  8. COVID19 infection    Plan  - COVID 19 + since the beginning of pandemic per family, at this time afebrile, CXR not suggestive of viral pneumonia  - Suspect volume overload as POCUS showing distended IVC and b-line pattern bilaterally  - Will start diuresis and aim for negative fluid balance   - Monitor strict I&O  - Check Echo  - Cardiology eval  - Nephrology consult  - Start calcitonin  - work up for hypercalcemia sent  - Cont. Anticoagulation for afib  - Fingerstick glucose monitoring with coverage for hyperglycemia  - Place NGT and start tube feedings  - Admit patient to ICU Assessment:  1. Acute hypercapnic respiratory failure  2. Heart failure with reduced EF  3. REY on CKD stage 4  4. Hypercalcemia   5. Afib  6. DM type 2  7. Hypothyroidism  8. COVID19 infection  9. Metabolic encephalopathy    Plan  - COVID 19 + since the beginning of pandemic per family, at this time afebrile, CXR not suggestive of viral pneumonia  - Suspect volume overload as POCUS showing distended IVC and b-line pattern bilaterally  - Will start diuresis and aim for negative fluid balance   - Monitor strict I&O  - Check Echo  - Cardiology eval  - Nephrology consult  - Start calcitonin  - work up for hypercalcemia sent  - Cont. Anticoagulation for afib  - Fingerstick glucose monitoring with coverage for hyperglycemia  - Place NGT and start tube feedings  - Admit patient to ICU

## 2020-06-12 NOTE — ED ADULT NURSE NOTE - NSIMPLEMENTINTERV_GEN_ALL_ED
Implemented All Fall Risk Interventions:  [place] to call system. Call bell, personal items and telephone within reach. Instruct patient to call for assistance. Room bathroom lighting operational. Non-slip footwear when patient is off stretcher. Physically safe environment: no spills, clutter or unnecessary equipment. Stretcher in lowest position, wheels locked, appropriate side rails in place. Provide visual cue, wrist band, yellow gown, etc. Monitor gait and stability. Monitor for mental status changes and reorient to person, place, and time. Review medications for side effects contributing to fall risk. Reinforce activity limits and safety measures with patient and family.

## 2020-06-12 NOTE — ED PROVIDER NOTE - PMH
Acute kidney failure    Acute respiratory failure    Atrial fibrillation    Bacteremia    COVID-19    Dehydration    Diabetes    Heart failure    HLD (hyperlipidemia)    HTN (hypertension)    Hypokalemia    Hypothyroid    Neuropathy    Sciatica Acute kidney failure    Atrial fibrillation  Status post cardioversion  Chronic kidney disease (CKD)  Baseline creatinine 1.6  COVID-19    GERD (gastroesophageal reflux disease)    Heart failure, systolic and diastolic    HTN (hypertension)    Hypothyroid    Neuropathy    Sciatica    Type 2 diabetes mellitus

## 2020-06-12 NOTE — ED PROVIDER NOTE - CRITICAL CARE PROVIDED
direct patient care (not related to procedure)/documentation/additional history taking/interpretation of diagnostic studies/consultation with other physicians/conducted a detailed discussion of DNR status/telephone consultation with the patient's family

## 2020-06-12 NOTE — H&P ADULT - PROBLEM SELECTOR PLAN 2
Possibly acute chronic, continue Lasix.  No ACE/ARB with renal dysfunction, cautious use of beta blocker as patient on midodrine on admission

## 2020-06-12 NOTE — ED ADULT NURSE REASSESSMENT NOTE - NS ED NURSE REASSESS COMMENT FT1
ETT inserted by Dr Lamas size 7.5, 23 at lip. ETT inserted by Dr Lamas size 7.5, 23 at lip. + Co2 detection. Breath sounds bilaterally.

## 2020-06-12 NOTE — H&P ADULT - NSICDXPASTMEDICALHX_GEN_ALL_CORE_FT
PAST MEDICAL HISTORY:  Acute kidney failure     Atrial fibrillation Status post cardioversion    Chronic kidney disease (CKD) Baseline creatinine 1.6    COVID-19     GERD (gastroesophageal reflux disease)     Heart failure, systolic and diastolic     HLD (hyperlipidemia)     HTN (hypertension)     Hypothyroid     Neuropathy     Sciatica     Type 2 diabetes mellitus PAST MEDICAL HISTORY:  Acute kidney failure     Atrial fibrillation Status post cardioversion    Chronic kidney disease (CKD) Baseline creatinine 1.6    COVID-19     GERD (gastroesophageal reflux disease)     Heart failure, systolic and diastolic     HTN (hypertension)     Hypothyroid     Neuropathy     Sciatica     Type 2 diabetes mellitus

## 2020-06-13 LAB
24R-OH-CALCIDIOL SERPL-MCNC: 29.5 NG/ML — LOW (ref 30–80)
A1C WITH ESTIMATED AVERAGE GLUCOSE RESULT: 5 % — SIGNIFICANT CHANGE UP (ref 4–5.6)
ALBUMIN SERPL ELPH-MCNC: 2.4 G/DL — LOW (ref 3.3–5)
ALP SERPL-CCNC: 68 U/L — SIGNIFICANT CHANGE UP (ref 40–120)
ALT FLD-CCNC: 7 U/L — LOW (ref 10–45)
ANION GAP SERPL CALC-SCNC: 2 MMOL/L — LOW (ref 5–17)
ANION GAP SERPL CALC-SCNC: 3 MMOL/L — LOW (ref 5–17)
ANION GAP SERPL CALC-SCNC: 4 MMOL/L — LOW (ref 5–17)
AST SERPL-CCNC: 23 U/L — SIGNIFICANT CHANGE UP (ref 10–40)
BASOPHILS # BLD AUTO: 0.03 K/UL — SIGNIFICANT CHANGE UP (ref 0–0.2)
BASOPHILS NFR BLD AUTO: 0.2 % — SIGNIFICANT CHANGE UP (ref 0–2)
BILIRUB SERPL-MCNC: 0.7 MG/DL — SIGNIFICANT CHANGE UP (ref 0.2–1.2)
BUN SERPL-MCNC: 29 MG/DL — HIGH (ref 7–23)
BUN SERPL-MCNC: 30 MG/DL — HIGH (ref 7–23)
BUN SERPL-MCNC: 32 MG/DL — HIGH (ref 7–23)
CA-I BLD-SCNC: 1.87 MMOL/L — CRITICAL HIGH (ref 1.12–1.3)
CALCIUM SERPL-MCNC: 12 MG/DL — HIGH (ref 8.4–10.5)
CALCIUM SERPL-MCNC: 12.6 MG/DL — HIGH (ref 8.4–10.5)
CALCIUM SERPL-MCNC: 12.6 MG/DL — HIGH (ref 8.4–10.5)
CALCIUM SERPL-MCNC: 13.6 MG/DL — CRITICAL HIGH (ref 8.4–10.5)
CHLORIDE SERPL-SCNC: 111 MMOL/L — HIGH (ref 96–108)
CHLORIDE SERPL-SCNC: 111 MMOL/L — HIGH (ref 96–108)
CHLORIDE SERPL-SCNC: 112 MMOL/L — HIGH (ref 96–108)
CO2 BLDA-SCNC: 33 MMOL/L — HIGH (ref 22–30)
CO2 SERPL-SCNC: 31 MMOL/L — SIGNIFICANT CHANGE UP (ref 22–31)
CO2 SERPL-SCNC: 33 MMOL/L — HIGH (ref 22–31)
CO2 SERPL-SCNC: 33 MMOL/L — HIGH (ref 22–31)
CREAT SERPL-MCNC: 1.92 MG/DL — HIGH (ref 0.5–1.3)
CREAT SERPL-MCNC: 1.97 MG/DL — HIGH (ref 0.5–1.3)
CREAT SERPL-MCNC: 2.01 MG/DL — HIGH (ref 0.5–1.3)
CULTURE RESULTS: NO GROWTH — SIGNIFICANT CHANGE UP
EOSINOPHIL # BLD AUTO: 0.14 K/UL — SIGNIFICANT CHANGE UP (ref 0–0.5)
EOSINOPHIL NFR BLD AUTO: 1 % — SIGNIFICANT CHANGE UP (ref 0–6)
ESTIMATED AVERAGE GLUCOSE: 97 MG/DL — SIGNIFICANT CHANGE UP (ref 68–114)
GAS PNL BLDA: SIGNIFICANT CHANGE UP
GLUCOSE BLDC GLUCOMTR-MCNC: 109 MG/DL — HIGH (ref 70–99)
GLUCOSE BLDC GLUCOMTR-MCNC: 112 MG/DL — HIGH (ref 70–99)
GLUCOSE BLDC GLUCOMTR-MCNC: 127 MG/DL — HIGH (ref 70–99)
GLUCOSE SERPL-MCNC: 100 MG/DL — HIGH (ref 70–99)
GLUCOSE SERPL-MCNC: 105 MG/DL — HIGH (ref 70–99)
GLUCOSE SERPL-MCNC: 115 MG/DL — HIGH (ref 70–99)
HCT VFR BLD CALC: 35.3 % — LOW (ref 39–50)
HCT VFR BLD CALC: 36 % — LOW (ref 39–50)
HGB BLD-MCNC: 10.1 G/DL — LOW (ref 13–17)
HGB BLD-MCNC: 10.6 G/DL — LOW (ref 13–17)
HOROWITZ INDEX BLDA+IHG-RTO: SIGNIFICANT CHANGE UP
IMM GRANULOCYTES NFR BLD AUTO: 0.3 % — SIGNIFICANT CHANGE UP (ref 0–1.5)
LYMPHOCYTES # BLD AUTO: 1.18 K/UL — SIGNIFICANT CHANGE UP (ref 1–3.3)
LYMPHOCYTES # BLD AUTO: 8.1 % — LOW (ref 13–44)
MAGNESIUM SERPL-MCNC: 1.3 MG/DL — LOW (ref 1.6–2.6)
MAGNESIUM SERPL-MCNC: 1.7 MG/DL — SIGNIFICANT CHANGE UP (ref 1.6–2.6)
MCHC RBC-ENTMCNC: 28.3 PG — SIGNIFICANT CHANGE UP (ref 27–34)
MCHC RBC-ENTMCNC: 28.6 GM/DL — LOW (ref 32–36)
MCHC RBC-ENTMCNC: 29.4 GM/DL — LOW (ref 32–36)
MCHC RBC-ENTMCNC: 29.4 PG — SIGNIFICANT CHANGE UP (ref 27–34)
MCV RBC AUTO: 98.9 FL — SIGNIFICANT CHANGE UP (ref 80–100)
MCV RBC AUTO: 99.7 FL — SIGNIFICANT CHANGE UP (ref 80–100)
MONOCYTES # BLD AUTO: 0.92 K/UL — HIGH (ref 0–0.9)
MONOCYTES NFR BLD AUTO: 6.4 % — SIGNIFICANT CHANGE UP (ref 2–14)
NEUTROPHILS # BLD AUTO: 12.16 K/UL — HIGH (ref 1.8–7.4)
NEUTROPHILS NFR BLD AUTO: 84 % — HIGH (ref 43–77)
NRBC # BLD: 0 /100 WBCS — SIGNIFICANT CHANGE UP (ref 0–0)
NRBC # BLD: 0 /100 WBCS — SIGNIFICANT CHANGE UP (ref 0–0)
PCO2 BLDA: 59 MMHG — HIGH (ref 32–46)
PH BLDA: 7.35 — SIGNIFICANT CHANGE UP (ref 7.35–7.45)
PHOSPHATE SERPL-MCNC: 2 MG/DL — LOW (ref 2.5–4.5)
PHOSPHATE SERPL-MCNC: 3 MG/DL — SIGNIFICANT CHANGE UP (ref 2.5–4.5)
PLATELET # BLD AUTO: 263 K/UL — SIGNIFICANT CHANGE UP (ref 150–400)
PLATELET # BLD AUTO: 268 K/UL — SIGNIFICANT CHANGE UP (ref 150–400)
PO2 BLDA: 107 MMHG — SIGNIFICANT CHANGE UP (ref 74–108)
POTASSIUM SERPL-MCNC: 3.8 MMOL/L — SIGNIFICANT CHANGE UP (ref 3.5–5.3)
POTASSIUM SERPL-MCNC: 4 MMOL/L — SIGNIFICANT CHANGE UP (ref 3.5–5.3)
POTASSIUM SERPL-MCNC: 4.6 MMOL/L — SIGNIFICANT CHANGE UP (ref 3.5–5.3)
POTASSIUM SERPL-SCNC: 3.8 MMOL/L — SIGNIFICANT CHANGE UP (ref 3.5–5.3)
POTASSIUM SERPL-SCNC: 4 MMOL/L — SIGNIFICANT CHANGE UP (ref 3.5–5.3)
POTASSIUM SERPL-SCNC: 4.6 MMOL/L — SIGNIFICANT CHANGE UP (ref 3.5–5.3)
PROT SERPL-MCNC: 5.6 G/DL — LOW (ref 6–8.3)
PROT SERPL-MCNC: 5.6 G/DL — LOW (ref 6–8.3)
PROT SERPL-MCNC: 6.3 G/DL — SIGNIFICANT CHANGE UP (ref 6–8.3)
PTH-INTACT FLD-MCNC: 7 PG/ML — LOW (ref 15–65)
RBC # BLD: 3.57 M/UL — LOW (ref 4.2–5.8)
RBC # BLD: 3.61 M/UL — LOW (ref 4.2–5.8)
RBC # FLD: 18.8 % — HIGH (ref 10.3–14.5)
RBC # FLD: 18.8 % — HIGH (ref 10.3–14.5)
SAO2 % BLDA: 98 % — HIGH (ref 92–96)
SODIUM SERPL-SCNC: 146 MMOL/L — HIGH (ref 135–145)
SODIUM SERPL-SCNC: 147 MMOL/L — HIGH (ref 135–145)
SODIUM SERPL-SCNC: 147 MMOL/L — HIGH (ref 135–145)
SPECIMEN SOURCE: SIGNIFICANT CHANGE UP
T3 SERPL-MCNC: 60 NG/DL — LOW (ref 80–200)
T4 AB SER-ACNC: 6 UG/DL — SIGNIFICANT CHANGE UP (ref 4.6–12)
TSH SERPL-MCNC: 2.92 UIU/ML — SIGNIFICANT CHANGE UP (ref 0.27–4.2)
URATE SERPL-MCNC: 7.4 MG/DL — SIGNIFICANT CHANGE UP (ref 3.4–8.8)
VIT D25+D1,25 OH+D1,25 PNL SERPL-MCNC: 27.9 PG/ML — SIGNIFICANT CHANGE UP (ref 19.9–79.3)
WBC # BLD: 13.75 K/UL — HIGH (ref 3.8–10.5)
WBC # BLD: 14.48 K/UL — HIGH (ref 3.8–10.5)
WBC # FLD AUTO: 13.75 K/UL — HIGH (ref 3.8–10.5)
WBC # FLD AUTO: 14.48 K/UL — HIGH (ref 3.8–10.5)

## 2020-06-13 RX ORDER — POTASSIUM PHOSPHATE, MONOBASIC POTASSIUM PHOSPHATE, DIBASIC 236; 224 MG/ML; MG/ML
15 INJECTION, SOLUTION INTRAVENOUS ONCE
Refills: 0 | Status: COMPLETED | OUTPATIENT
Start: 2020-06-13 | End: 2020-06-13

## 2020-06-13 RX ORDER — MAGNESIUM SULFATE 500 MG/ML
2 VIAL (ML) INJECTION ONCE
Refills: 0 | Status: DISCONTINUED | OUTPATIENT
Start: 2020-06-13 | End: 2020-06-13

## 2020-06-13 RX ORDER — MAGNESIUM SULFATE 500 MG/ML
1 VIAL (ML) INJECTION
Refills: 0 | Status: COMPLETED | OUTPATIENT
Start: 2020-06-13 | End: 2020-06-13

## 2020-06-13 RX ORDER — MAGNESIUM SULFATE 500 MG/ML
1 VIAL (ML) INJECTION
Refills: 0 | Status: DISCONTINUED | OUTPATIENT
Start: 2020-06-13 | End: 2020-06-13

## 2020-06-13 RX ORDER — MAGNESIUM SULFATE 500 MG/ML
2 VIAL (ML) INJECTION EVERY 4 HOURS
Refills: 0 | Status: DISCONTINUED | OUTPATIENT
Start: 2020-06-13 | End: 2020-06-13

## 2020-06-13 RX ORDER — FUROSEMIDE 40 MG
40 TABLET ORAL ONCE
Refills: 0 | Status: COMPLETED | OUTPATIENT
Start: 2020-06-13 | End: 2020-06-13

## 2020-06-13 RX ADMIN — CALCITONIN SALMON 340 INTERNATIONAL UNIT(S): 200 INJECTION, SOLUTION INTRAMUSCULAR at 17:15

## 2020-06-13 RX ADMIN — MIDODRINE HYDROCHLORIDE 10 MILLIGRAM(S): 2.5 TABLET ORAL at 16:38

## 2020-06-13 RX ADMIN — Medication 2 PUFF(S): at 15:21

## 2020-06-13 RX ADMIN — Medication 2 PUFF(S): at 09:50

## 2020-06-13 RX ADMIN — MIDODRINE HYDROCHLORIDE 10 MILLIGRAM(S): 2.5 TABLET ORAL at 05:45

## 2020-06-13 RX ADMIN — Medication 40 MILLIGRAM(S): at 09:01

## 2020-06-13 RX ADMIN — ALBUTEROL 2 PUFF(S): 90 AEROSOL, METERED ORAL at 15:20

## 2020-06-13 RX ADMIN — ALBUTEROL 2 PUFF(S): 90 AEROSOL, METERED ORAL at 09:48

## 2020-06-13 RX ADMIN — FAMOTIDINE 20 MILLIGRAM(S): 10 INJECTION INTRAVENOUS at 11:04

## 2020-06-13 RX ADMIN — Medication 100 GRAM(S): at 09:03

## 2020-06-13 RX ADMIN — ALBUTEROL 2 PUFF(S): 90 AEROSOL, METERED ORAL at 22:11

## 2020-06-13 RX ADMIN — SODIUM CHLORIDE 50 MILLILITER(S): 9 INJECTION, SOLUTION INTRAVENOUS at 07:13

## 2020-06-13 RX ADMIN — Medication 2 PUFF(S): at 22:11

## 2020-06-13 RX ADMIN — APIXABAN 5 MILLIGRAM(S): 2.5 TABLET, FILM COATED ORAL at 17:14

## 2020-06-13 RX ADMIN — Medication 0: at 23:48

## 2020-06-13 RX ADMIN — Medication 25 MICROGRAM(S): at 05:45

## 2020-06-13 RX ADMIN — Medication 2 PUFF(S): at 04:09

## 2020-06-13 RX ADMIN — SODIUM CHLORIDE 50 MILLILITER(S): 9 INJECTION, SOLUTION INTRAVENOUS at 17:15

## 2020-06-13 RX ADMIN — CHLORHEXIDINE GLUCONATE 15 MILLILITER(S): 213 SOLUTION TOPICAL at 17:14

## 2020-06-13 RX ADMIN — CHLORHEXIDINE GLUCONATE 15 MILLILITER(S): 213 SOLUTION TOPICAL at 05:46

## 2020-06-13 RX ADMIN — ALBUTEROL 2 PUFF(S): 90 AEROSOL, METERED ORAL at 04:09

## 2020-06-13 RX ADMIN — APIXABAN 5 MILLIGRAM(S): 2.5 TABLET, FILM COATED ORAL at 05:45

## 2020-06-13 RX ADMIN — MIDODRINE HYDROCHLORIDE 10 MILLIGRAM(S): 2.5 TABLET ORAL at 11:04

## 2020-06-13 RX ADMIN — POTASSIUM PHOSPHATE, MONOBASIC POTASSIUM PHOSPHATE, DIBASIC 62.5 MILLIMOLE(S): 236; 224 INJECTION, SOLUTION INTRAVENOUS at 09:34

## 2020-06-13 RX ADMIN — CALCITONIN SALMON 340 INTERNATIONAL UNIT(S): 200 INJECTION, SOLUTION INTRAMUSCULAR at 06:02

## 2020-06-13 RX ADMIN — Medication 100 GRAM(S): at 10:18

## 2020-06-13 RX ADMIN — CHLORHEXIDINE GLUCONATE 1 APPLICATION(S): 213 SOLUTION TOPICAL at 05:46

## 2020-06-13 NOTE — PROGRESS NOTE ADULT - SUBJECTIVE AND OBJECTIVE BOX
Patient is a 75y old  Male who presents with a chief complaint of Respiratory failure (2020 10:25)    Patient seen in follow up for REY, Hypercalcemia.        PAST MEDICAL HISTORY:  GERD (gastroesophageal reflux disease)  Chronic kidney disease (CKD)  Type 2 diabetes mellitus  Heart failure, systolic and diastolic  Sciatica  Heart failure  Atrial fibrillation  HTN (hypertension)  Neuropathy  Diabetes  Hypokalemia  HLD (hyperlipidemia)  Hypothyroid  Bacteremia  Acute respiratory failure  Dehydration  COVID-19  Acute kidney failure    MEDICATIONS  (STANDING):  ALBUTerol    90 MICROgram(s) HFA Inhaler 2 Puff(s) Inhalation every 6 hours  apixaban 5 milliGRAM(s) Oral every 12 hours  calcitonin Injectable 340 International Unit(s) IntraMuscular every 12 hours  chlorhexidine 0.12% Liquid 15 milliLiter(s) Oral Mucosa every 12 hours  chlorhexidine 4% Liquid 1 Application(s) Topical <User Schedule>  dextrose 5%. 1000 milliLiter(s) (50 mL/Hr) IV Continuous <Continuous>  dextrose 5%. 1000 milliLiter(s) (50 mL/Hr) IV Continuous <Continuous>  dextrose 50% Injectable 12.5 Gram(s) IV Push once  dextrose 50% Injectable 25 Gram(s) IV Push once  dextrose 50% Injectable 25 Gram(s) IV Push once  famotidine    Tablet 20 milliGRAM(s) Oral daily  insulin lispro (HumaLOG) corrective regimen sliding scale   SubCutaneous every 6 hours  ipratropium 17 MICROgram(s) HFA Inhaler 2 Puff(s) Inhalation every 6 hours  levothyroxine 25 MICROGram(s) Oral daily  midodrine. 10 milliGRAM(s) Oral three times a day    MEDICATIONS  (PRN):  acetaminophen    Suspension .. 650 milliGRAM(s) Oral every 6 hours PRN Temp greater or equal to 38C (100.4F), Mild Pain (1 - 3)    T(C): 37.3 (20 @ 11:00), Max: 37.4 (20 @ 18:00)  HR: 81 (20 @ 12:33) (72 - 90)  BP: 111/57 (20 @ 12:00) (90/57 - 132/76)  RR: 21 (20 @ 12:00) (18 - 40)  SpO2: 100% (20 @ 12:33) (73% - 100%)  Wt(kg): --  I&O's Detail    2020 07:01  -  2020 07:00  --------------------------------------------------------  IN:    dextrose 5%.: 600 mL    Enteral Tube Flush: 60 mL    Glucerna 1.5: 360 mL    Solution: 50 mL  Total IN: 1070 mL    OUT:    Indwelling Catheter - Urethral: 3925 mL  Total OUT: 3925 mL    Total NET: -2855 mL      2020 07:01  -  2020 12:56  --------------------------------------------------------  IN:    dextrose 5%.: 200 mL    Glucerna 1.5: 180 mL    Solution: 187.5 mL    Solution: 200 mL  Total IN: 767.5 mL    OUT:    Indwelling Catheter - Urethral: 660 mL  Total OUT: 660 mL    Total NET: 107.5 mL          PHYSICAL EXAM:  General: No distress  Respiratory: b/l air entry  Cardiovascular: S1 S2  Gastrointestinal: soft  Extremities:  edema    Mode: CPAP with PS, FiO2: 30, PEEP: 5, PS: 5, MAP: 7.3, PIP: 11                          10.6   14.48 )-----------( 268      ( 2020 06:00 )             36.0     06-13    147<H>  |  112<H>  |  29<H>  ----------------------------<  100<H>  4.0   |  31  |  2.01<H>    Ca    12.6<H>      2020 06:00  Phos  2.0     06-13  Mg     1.3     06-13    TPro  6.3  /  Alb  2.4<L>  /  TBili  0.7  /  DBili  x   /  AST  23  /  ALT  7<L>  /  AlkPhos  68  06-13    CARDIAC MARKERS ( 2020 16:57 )  .069 ng/mL / x     / x     / x     / x      CARDIAC MARKERS ( 2020 08:00 )  .063 ng/mL / x     / 17 U/L / x     / x          LIVER FUNCTIONS - ( 2020 06:00 )  Alb: 2.4 g/dL / Pro: 6.3 g/dL / ALK PHOS: 68 U/L / ALT: 7 U/L / AST: 23 U/L / GGT: x           Urinalysis Basic - ( 2020 08:50 )    Color: Yellow / Appearance: Clear / S.025 / pH: x  Gluc: x / Ketone: Negative  / Bili: Negative / Urobili: Negative   Blood: x / Protein: 30 mg/dL / Nitrite: Negative   Leuk Esterase: Negative / RBC: Negative /HPF / WBC Negative /HPF   Sq Epi: x / Non Sq Epi: Neg.-Few / Bacteria: Negative /HPF      ABG - ( 2020 12:05 )  pH, Arterial: 7.35  pH, Blood: x     /  pCO2: 59    /  pO2: 107   / HCO3: x     / Base Excess: x     /  SaO2: 98                Sodium, Serum: 147 ( @ 06:00)  Sodium, Serum: 147 ( @ 13:30)  Sodium, Serum: 146 ( @ 08:00)    Creatinine, Serum: 2.01 ( @ 06:00)  Creatinine, Serum: 2.16 ( @ 13:30)  Creatinine, Serum: 2.19 ( @ 08:00)    Potassium, Serum: 4.0 ( @ 06:00)  Potassium, Serum: 4.8 ( 13:30)  Potassium, Serum: 4.8 ( @ 08:00)    Hemoglobin: 10.6 ( @ 06:00)  Hemoglobin: 11.2 ( @ 08:00)

## 2020-06-13 NOTE — PROGRESS NOTE ADULT - ASSESSMENT
Physical Exam  Gen:  Not in distress,   ENT:  Orally intubated NC/AT, no JVD noted  Thorax:  Symmetric, no retractions  Lung:  Scattered rhonchi throughout, no wheezing   CV:  S1, S2. RRR  Abd:  Soft, NT/ND.  BS normoactive, no masses to palp  Extrem:  No C/C/E, DP/radial pulses +2  Neuro:  A&O x 3, no gross motor/sensory deficits    Assessment:  1. Acute hypercapnic respiratory failure  2. Heart failure with reduced EF  3. REY on CKD stage 4  4. Hypercalcemia   5. Afib  6. DM type 2  7. Hypothyroidism  8. COVID19 infection  9. Metabolic encephalopathy    Plan  - COVID 19 + since the beginning of pandemic per family, at this time afebrile, CXR not suggestive of viral pneumonia  - Overall in negative fluid balance  - Will plan for SBT this morning  - Monitor strict I&O  - Echo noted with depressed EF  - Cardiology eval pending  - Nephrology consult appreciated  - Cont calcitonin, will follow calcium level  - work up for hypercalcemia sent  - Cont. Anticoagulation for afib  - Fingerstick glucose monitoring with coverage for hyperglycemia  - Place NGT and cont. tube feedings  - Jose Hernandez [phone] - message left for call back

## 2020-06-13 NOTE — PROGRESS NOTE ADULT - ASSESSMENT
Severe CM, EF 25-30%, mod TR/AS  Resp failure, CHF/COPD exacerbation, COVID +, intubated   Renal fx is close to prior baseline  Not oliguric  Neely, I/Os  Avoid nephrotoxins  Hypercalcemia improving trend on calcitonin. Low PTH and Vit D not elevated. PTHrp pending.   On D5W. Consider changing IVF to NS or half NS and loop diuretic for correction of hypercalcemia. Free water with tube feeds.   Avoid nephrotoxic meds as possible. Will follow electrolytes and renal function trend. Severe CM, EF 25-30%, mod TR/AS  Resp failure, CHF/COPD exacerbation, COVID +, intubated   Renal fx is close to prior baseline  Not oliguric  Neely, I/Os  Avoid nephrotoxins  Hypercalcemia improving trend on calcitonin. Low PTH and Vit D not elevated. PTHrp pending.   On D5W. Consider changing IVF to NS or half NS and loop diuretic for correction of hypercalcemia. Free water with tube feeds.   Mg and Phos supps. Avoid nephrotoxic meds as possible. Will follow electrolytes and renal function trend.

## 2020-06-13 NOTE — PROGRESS NOTE ADULT - SUBJECTIVE AND OBJECTIVE BOX
Patient is a 75y old  Male who presents with a chief complaint of Respiratory failure (13 Jun 2020 22:44)      BRIEF HOSPITAL COURSE:     75M w/ PMH listed below, arrived from [facility] with AMS. In ED ABG revealed severe hypercapneic resp. failure (pH 7.00 w/ PCO2 >100), patient was inubated and place don mechincal ventialtion. Of note patient also tested COVID-19 (+), per report he has been (+) "since the beginning of the pandemic, per family"    Events last 24 hours:   -no acute events, failed CPAP trial during day    PAST MEDICAL & SURGICAL HISTORY:  GERD (gastroesophageal reflux disease)  Chronic kidney disease (CKD): Baseline creatinine 1.6  Type 2 diabetes mellitus  Heart failure, systolic and diastolic  Sciatica  Atrial fibrillation: Status post cardioversion  HTN (hypertension)  Neuropathy  Hypothyroid  COVID-19  Acute kidney failure  Artificial pacemaker      Review of Systems:  unable to obtain as patient inubated.           chlorhexidine 0.12% Liquid 15 milliLiter(s) Oral Mucosa every 12 hours  chlorhexidine 4% Liquid 1 Application(s) Topical <User Schedule>        Mode: AC/ CMV (Assist Control/ Continuous Mandatory Ventilation)  RR (machine): 24  TV (machine): 450  FiO2: 30  PEEP: 5  ITime: 1  MAP: 12  PIP: 24      ICU Vital Signs Last 24 Hrs  T(C): 37.4 (14 Jun 2020 03:00), Max: 37.5 (13 Jun 2020 19:00)  T(F): 99.4 (14 Jun 2020 03:00), Max: 99.5 (13 Jun 2020 19:00)  HR: 80 (14 Jun 2020 06:00) (75 - 85)  BP: 129/70 (14 Jun 2020 06:00) (82/62 - 129/70)  BP(mean): 87 (14 Jun 2020 06:00) (63 - 91)  RR: 24 (14 Jun 2020 06:00) (14 - 30)  SpO2: 100% (14 Jun 2020 06:00) (98% - 100%)      ABG - ( 13 Jun 2020 12:05 )  pH, Arterial: 7.35  pH, Blood: x     /  pCO2: 59    /  pO2: 107   / HCO3: x     / Base Excess: x     /  SaO2: 98                            LABS:                        10.6   14.48 )-----------( 268      ( 13 Jun 2020 06:00 )             36.0     06-13    146<H>  |  111<H>  |  32<H>  ----------------------------<  115<H>  4.6   |  33<H>  |  1.92<H>    Ca    12.0<H>      13 Jun 2020 15:14  Phos  3.0     06-13  Mg     1.7     06-13                  Physical Examination:    General: Intubated, lethargic    HEENT: Pupils equal, reactive to light.  Symmetric.    PULM: Clear to auscultation bilaterally, no significant sputum production    CVS: Regular rate and rhythm, no murmurs, rubs, or gallops    ABD: Soft, nondistended, nontender, normoactive bowel sounds, no masses    EXT: No edema, nontender    SKIN: Warm and well perfused, no rashes noted.

## 2020-06-13 NOTE — PROGRESS NOTE ADULT - ASSESSMENT
75M w/ PMH listed below, arrived from [facility] with AMS. In ED ABG revealed severe hypercapneic resp. failure (pH 7.00 w/ PCO2 >100), patient was inubated and place don mechincal ventialtion. Of note patient also tested COVID-19 (+), per report he has been (+) "since the beginning of the pandemic, per family"    Events last 24 hours:   -no acute events, failed CPAP trial during day    PLAN:    #Hypercapenic Resp. Failure  -Patient currently on Full vent support  -titrate settings to maintain SaO2 >90%, or pH >7.25  -consider low tidal volume ventilation strategy w/ goal Tv 4-6 cc/kg of ideal body weight  -plateu pressure goal <30  -Peridex oral care  -aggressive pulmonary toilet  -daily  spontaneous breathing trial if clinical condition warrants, discuss with respiratory therapy     #COVID-19 (+)  -per note review, day team discussed with family and he has been persistently (+) since start of pandemic  -maintain airborne/contact isolation precautions    #AMS  -opens eyes to voice, moves legs, more awake now  -did have CT on ED arrival  -will hold sedation and assess mental status       #Acute on Chronic Renal failure   -renal is following  -wilhelm with Q1H uop assessment  -renal dose meds and avoid nephrotoxins    #Hypercalcemia   -started on calcitonin

## 2020-06-13 NOTE — CHART NOTE - NSCHARTNOTEFT_GEN_A_CORE
Nutrition Initial Assessment    Nutrition Consult Received: Yes [   ]  No [X]    Reason for Initial Nutrition Assessment: ICU length of stay policy     Source of Information: Unable to conduct a fact to face interview due to limited contact restrictions related to pt's medical condition and isolation precautions. Information obtained from the EMR, paper chart.    Admitting Diagnosis: 75M w/ PMH listed below, arrived from [facility] with AMS. In ED ABG revealed severe hypercapnic resp. failure (pH 7.00 w/ PCO2 >100), patient was intubated and placed on mechanical ventilation. Of note patient also tested COVID-19 (+).     PAST MEDICAL & SURGICAL HISTORY:  GERD (gastroesophageal reflux disease)  Chronic kidney disease (CKD): Baseline creatinine 1.6  Type 2 diabetes mellitus  Heart failure, systolic and diastolic  Sciatica  Atrial fibrillation: Status post cardioversion  HTN (hypertension)  Neuropathy  Hypothyroid  COVID-19  Acute kidney failure  Artificial pacemaker      Subjective Information: Pt admitted from [facility], where he was previously on regular c thins, consistent carbohydrate, renal diet + proform BID. At present, pt remains intubated, NPO + enteral nutrition infusing via OGT at goal rate with no evidence of intolerance per RN. As per PA, pt with plan for CPAP trials today with possible extubation. Noted hypophosphatemia and hypomagnesemia, suggest repletion per medical team.       GI Issues: none    Current Nutrition Order: NPO +enteral nutrition  Enteral nutrition: Glucerna 1.5 @ 45cc/hr x 24 hours via OGT  Provides: 1,620kcals, 90g protein, 821 ml H2O    PO Intake: N/A    Skin Integrity: Stage II sacrum, unstageable sacrum     Labs:   06-13 Na147 mmol/L<H> Glu 100 mg/dL<H> K+ 4.0 mmol/L Cr  2.01 mg/dL<H> BUN 29 mg/dL<H> Phos 2.0 mg/dL<L> Alb 2.4 g/dL<L> PAB n/a       POCT Blood Glucose.: 112 mg/dL (06-13-20 @ 04:43)  POCT Blood Glucose.: 77 mg/dL (06-12-20 @ 23:47)  POCT Blood Glucose.: 88 mg/dL (06-12-20 @ 22:11)  POCT Blood Glucose.: 98 mg/dL (06-12-20 @ 18:16)  POCT Blood Glucose.: 47 mg/dL (06-12-20 @ 17:53)  POCT Blood Glucose.: 76 mg/dL (06-12-20 @ 11:53)  POCT Blood Glucose.: 82 mg/dL (06-12-20 @ 10:18)    Medications:  MEDICATIONS  (STANDING):  ALBUTerol    90 MICROgram(s) HFA Inhaler 2 Puff(s) Inhalation every 6 hours  apixaban 5 milliGRAM(s) Oral every 12 hours  calcitonin Injectable 340 International Unit(s) IntraMuscular every 12 hours  chlorhexidine 0.12% Liquid 15 milliLiter(s) Oral Mucosa every 12 hours  chlorhexidine 4% Liquid 1 Application(s) Topical <User Schedule>  dextrose 5%. 1000 milliLiter(s) (50 mL/Hr) IV Continuous <Continuous>  dextrose 5%. 1000 milliLiter(s) (50 mL/Hr) IV Continuous <Continuous>  dextrose 50% Injectable 12.5 Gram(s) IV Push once  dextrose 50% Injectable 25 Gram(s) IV Push once  dextrose 50% Injectable 25 Gram(s) IV Push once  famotidine    Tablet 20 milliGRAM(s) Oral daily  insulin lispro (HumaLOG) corrective regimen sliding scale   SubCutaneous every 6 hours  ipratropium 17 MICROgram(s) HFA Inhaler 2 Puff(s) Inhalation every 6 hours  levothyroxine 25 MICROGram(s) Oral daily  magnesium sulfate  IVPB 1 Gram(s) IV Intermittent every 1 hour  midodrine. 10 milliGRAM(s) Oral three times a day    MEDICATIONS  (PRN):  acetaminophen    Suspension .. 650 milliGRAM(s) Oral every 6 hours PRN Temp greater or equal to 38C (100.4F), Mild Pain (1 - 3)    Admitted Anthropometrics:    Height (cm): 180.3 (06-12-20 @ 11:00)  Weight (kg): 92.5 (06-12-20 @ 11:00)  BMI (kg/m2): 28.5 (06-12-20 @ 11:00)    Nutrition Focused Physical Exam: Unable to complete due to limited isolation contact precautions at this time.     Estimated Energy Needs (15 kcal/kg- 20 kcal/kg): 1395-1860kcals/day  Estimated Protein Needs (1.2 g/kg- 1.4 g/kg): 112-130g/day   Based on weight of: 92.5kg current weight     [X] Nutrition Diagnosis: Increased nutrient needs related to increased demand of nutrient (protein/calories) for wound healing, +hypoxic respiratory failure as evidenced by stage II pressure ulcer/unstageable on sacrum, +COVID 19 requiring intubation    Goal: Pt will meet >75% estimated nutritional needs via tolerated route throughout hospitalization     Nutrition Interventions: enteral nutrition    Recommendations:  1. Recommend increase tube feeding to Glucerna 1.5 @ 50cc/hr x 24 hours + prosource QD via OGT to provide 1,860kcals, 115g protein, 912ml H2O  2. Recommend adding MVI, Vitamin C to facilitate wound healing  3. If pt extubated, recommend resume oral diet (consistent carbohydrate, DASH/TLC) or swallow evaluation if evidence of dysphagia for appropriate consistency     RD to follow-up per protocol.  Angie Cantor RDN   Pager #[phone]

## 2020-06-13 NOTE — PROGRESS NOTE ADULT - SUBJECTIVE AND OBJECTIVE BOX
Follow-up Critical Care Progress Note  Chief Complaint : COVID-19    More awake this morning, following simple commands. Not in distress. Denies any pain.     Allergies :No Known Allergies    PAST MEDICAL & SURGICAL HISTORY:  GERD (gastroesophageal reflux disease)  Chronic kidney disease (CKD): Baseline creatinine 1.6  Type 2 diabetes mellitus  Heart failure, systolic and diastolic  Sciatica  Atrial fibrillation: Status post cardioversion  HTN (hypertension)  Neuropathy  Hypothyroid  COVID-19  Acute kidney failure  Artificial pacemaker      Medications:  MEDICATIONS  (STANDING):  ALBUTerol    90 MICROgram(s) HFA Inhaler 2 Puff(s) Inhalation every 6 hours  apixaban 5 milliGRAM(s) Oral every 12 hours  calcitonin Injectable 340 International Unit(s) IntraMuscular every 12 hours  chlorhexidine 0.12% Liquid 15 milliLiter(s) Oral Mucosa every 12 hours  chlorhexidine 4% Liquid 1 Application(s) Topical <User Schedule>  dextrose 5%. 1000 milliLiter(s) (50 mL/Hr) IV Continuous <Continuous>  dextrose 5%. 1000 milliLiter(s) (50 mL/Hr) IV Continuous <Continuous>  dextrose 50% Injectable 12.5 Gram(s) IV Push once  dextrose 50% Injectable 25 Gram(s) IV Push once  dextrose 50% Injectable 25 Gram(s) IV Push once  famotidine    Tablet 20 milliGRAM(s) Oral daily  insulin lispro (HumaLOG) corrective regimen sliding scale   SubCutaneous every 6 hours  ipratropium 17 MICROgram(s) HFA Inhaler 2 Puff(s) Inhalation every 6 hours  levothyroxine 25 MICROGram(s) Oral daily  midodrine. 10 milliGRAM(s) Oral three times a day    MEDICATIONS  (PRN):  acetaminophen    Suspension .. 650 milliGRAM(s) Oral every 6 hours PRN Temp greater or equal to 38C (100.4F), Mild Pain (1 - 3)      LABS:                        10.6   14.48 )-----------( 268      ( 2020 06:00 )             36.0     06-13    147<H>  |  112<H>  |  29<H>  ----------------------------<  100<H>  4.0   |  31  |  2.01<H>    Ca    12.6<H>      2020 06:00  Phos  2.0     06-13  Mg     1.3     06-13    TPro  6.3  /  Alb  2.4<L>  /  TBili  0.7  /  DBili  x   /  AST  23  /  ALT  7<L>  /  AlkPhos  68      HIT ab --  @ 08:00  HIT ab EIA --  D Dimer -464    CARDIAC MARKERS ( 2020 16:57 )  .069 ng/mL / x     / x     / x     / x      CARDIAC MARKERS ( 2020 08:00 )  .063 ng/mL / x     / 17 U/L / x     / x        PT/INR - ( 2020 08:00 )   PT: 22.5 sec;   INR: 1.95 ratio       PTT - ( 2020 08:00 )  PTT:38.6 sec  Urinalysis Basic - ( 2020 08:50 )    Color: Yellow / Appearance: Clear / S.025 / pH: x  Gluc: x / Ketone: Negative  / Bili: Negative / Urobili: Negative   Blood: x / Protein: 30 mg/dL / Nitrite: Negative   Leuk Esterase: Negative / RBC: Negative /HPF / WBC Negative /HPF   Sq Epi: x / Non Sq Epi: Neg.-Few / Bacteria: Negative /HPF      Procalcitonin, Serum: 0.24 ng/mL (20 @ 08:00)    Serum Pro-Brain Natriuretic Peptide: >88401 pg/mL (20 @ 13:30)    CULTURES: (if applicable)    Culture - Urine (collected 20 @ 08:50)  Source: .Urine Clean Catch (Midstream)  Final Report (20 @ 10:25):    No growth    ABG - ( 2020 11:55 )  pH, Arterial: 7.33  pH, Blood: x     /  pCO2: 51    /  pO2: 146   / HCO3: x     / Base Excess: x     /  SaO2: >99       VITALS:  T(C): 37.2 (20 @ 07:00), Max: 37.4 (20 @ 18:00)  T(F): 98.9 (20 @ 07:00), Max: 99.3 (20 @ 18:00)  HR: 85 (20 @ 10:04) (72 - 86)  BP: 102/64 (20 @ 08:00) (100/77 - 132/76)  BP(mean): 76 (20 @ 08:00) (73 - 96)  ABP: --  ABP(mean): --  RR: 30 (20 @ 08:00) (21 - 30)  SpO2: 100% (20 @ 10:04) (99% - 100%)  CVP(mm Hg): --  CVP(cm H2O): --    Ins and Outs     20 @ 07:01  -  20 @ 07:00  --------------------------------------------------------  IN: 1070 mL / OUT: 3925 mL / NET: -2855 mL    20 @ 07:01  -  20 @ 10:31  --------------------------------------------------------  IN: 185 mL / OUT: 110 mL / NET: 75 mL        Height (cm): 180.3 (20 @ 11:00)  Weight (kg): 92.5 (20 @ 11:00)  BMI (kg/m2): 28.5 (20 @ 11:00)    Device: 840, Mode: CPAP with PS, RR (patient): 24, TV (patient): 520, FiO2: 30, PEEP: 5, PS: 10, MAP: 8.5, PIP: 16    I&O's Detail    2020 07:01  -  2020 07:00  --------------------------------------------------------  IN:    dextrose 5%.: 600 mL    Enteral Tube Flush: 60 mL    Glucerna 1.5: 360 mL    Solution: 50 mL  Total IN: 1070 mL    OUT:    Intermittent Catheterization - Urethral: 3925 mL  Total OUT: 3925 mL    Total NET: -2855 mL      2020 07:01  -  2020 10:31  --------------------------------------------------------  IN:    dextrose 5%.: 100 mL    Glucerna 1.5: 85 mL  Total IN: 185 mL    OUT:    Intermittent Catheterization - Urethral: 110 mL  Total OUT: 110 mL    Total NET: 75 mL

## 2020-06-14 LAB
ALBUMIN SERPL ELPH-MCNC: 2.2 G/DL — LOW (ref 3.3–5)
ALP SERPL-CCNC: 65 U/L — SIGNIFICANT CHANGE UP (ref 40–120)
ALT FLD-CCNC: 9 U/L — LOW (ref 10–45)
ANION GAP SERPL CALC-SCNC: 5 MMOL/L — SIGNIFICANT CHANGE UP (ref 5–17)
AST SERPL-CCNC: 15 U/L — SIGNIFICANT CHANGE UP (ref 10–40)
BASOPHILS # BLD AUTO: 0.04 K/UL — SIGNIFICANT CHANGE UP (ref 0–0.2)
BASOPHILS NFR BLD AUTO: 0.3 % — SIGNIFICANT CHANGE UP (ref 0–2)
BILIRUB SERPL-MCNC: 0.4 MG/DL — SIGNIFICANT CHANGE UP (ref 0.2–1.2)
BUN SERPL-MCNC: 32 MG/DL — HIGH (ref 7–23)
CALCIUM SERPL-MCNC: 11.6 MG/DL — HIGH (ref 8.4–10.5)
CHLORIDE SERPL-SCNC: 108 MMOL/L — SIGNIFICANT CHANGE UP (ref 96–108)
CO2 BLDA-SCNC: 35 MMOL/L — HIGH (ref 22–30)
CO2 SERPL-SCNC: 34 MMOL/L — HIGH (ref 22–31)
CREAT SERPL-MCNC: 1.66 MG/DL — HIGH (ref 0.5–1.3)
EOSINOPHIL # BLD AUTO: 0.4 K/UL — SIGNIFICANT CHANGE UP (ref 0–0.5)
EOSINOPHIL NFR BLD AUTO: 3.5 % — SIGNIFICANT CHANGE UP (ref 0–6)
GAS PNL BLDA: SIGNIFICANT CHANGE UP
GLUCOSE BLDC GLUCOMTR-MCNC: 108 MG/DL — HIGH (ref 70–99)
GLUCOSE BLDC GLUCOMTR-MCNC: 111 MG/DL — HIGH (ref 70–99)
GLUCOSE BLDC GLUCOMTR-MCNC: 97 MG/DL — SIGNIFICANT CHANGE UP (ref 70–99)
GLUCOSE SERPL-MCNC: 110 MG/DL — HIGH (ref 70–99)
HCT VFR BLD CALC: 33.8 % — LOW (ref 39–50)
HGB BLD-MCNC: 10 G/DL — LOW (ref 13–17)
HOROWITZ INDEX BLDA+IHG-RTO: SIGNIFICANT CHANGE UP
IMM GRANULOCYTES NFR BLD AUTO: 0.3 % — SIGNIFICANT CHANGE UP (ref 0–1.5)
LYMPHOCYTES # BLD AUTO: 1.43 K/UL — SIGNIFICANT CHANGE UP (ref 1–3.3)
LYMPHOCYTES # BLD AUTO: 12.5 % — LOW (ref 13–44)
MAGNESIUM SERPL-MCNC: 1.6 MG/DL — SIGNIFICANT CHANGE UP (ref 1.6–2.6)
MCHC RBC-ENTMCNC: 28.7 PG — SIGNIFICANT CHANGE UP (ref 27–34)
MCHC RBC-ENTMCNC: 29.6 GM/DL — LOW (ref 32–36)
MCV RBC AUTO: 96.8 FL — SIGNIFICANT CHANGE UP (ref 80–100)
MONOCYTES # BLD AUTO: 0.95 K/UL — HIGH (ref 0–0.9)
MONOCYTES NFR BLD AUTO: 8.3 % — SIGNIFICANT CHANGE UP (ref 2–14)
NEUTROPHILS # BLD AUTO: 8.63 K/UL — HIGH (ref 1.8–7.4)
NEUTROPHILS NFR BLD AUTO: 75.1 % — SIGNIFICANT CHANGE UP (ref 43–77)
NRBC # BLD: 0 /100 WBCS — SIGNIFICANT CHANGE UP (ref 0–0)
PCO2 BLDA: 53 MMHG — HIGH (ref 32–46)
PH BLDA: 7.41 — SIGNIFICANT CHANGE UP (ref 7.35–7.45)
PHOSPHATE SERPL-MCNC: 1.6 MG/DL — LOW (ref 2.5–4.5)
PLATELET # BLD AUTO: 251 K/UL — SIGNIFICANT CHANGE UP (ref 150–400)
PO2 BLDA: 117 MMHG — HIGH (ref 74–108)
POTASSIUM SERPL-MCNC: 4 MMOL/L — SIGNIFICANT CHANGE UP (ref 3.5–5.3)
POTASSIUM SERPL-SCNC: 4 MMOL/L — SIGNIFICANT CHANGE UP (ref 3.5–5.3)
PROT SERPL-MCNC: 5.9 G/DL — LOW (ref 6–8.3)
RBC # BLD: 3.49 M/UL — LOW (ref 4.2–5.8)
RBC # FLD: 19 % — HIGH (ref 10.3–14.5)
SAO2 % BLDA: 99 % — HIGH (ref 92–96)
SODIUM SERPL-SCNC: 147 MMOL/L — HIGH (ref 135–145)
WBC # BLD: 11.48 K/UL — HIGH (ref 3.8–10.5)
WBC # FLD AUTO: 11.48 K/UL — HIGH (ref 3.8–10.5)

## 2020-06-14 RX ORDER — POTASSIUM PHOSPHATE, MONOBASIC POTASSIUM PHOSPHATE, DIBASIC 236; 224 MG/ML; MG/ML
15 INJECTION, SOLUTION INTRAVENOUS ONCE
Refills: 0 | Status: COMPLETED | OUTPATIENT
Start: 2020-06-14 | End: 2020-06-14

## 2020-06-14 RX ORDER — SODIUM CHLORIDE 9 MG/ML
1000 INJECTION, SOLUTION INTRAVENOUS
Refills: 0 | Status: DISCONTINUED | OUTPATIENT
Start: 2020-06-14 | End: 2020-06-16

## 2020-06-14 RX ORDER — CALCITONIN SALMON 200 [IU]/ML
370 INJECTION, SOLUTION INTRAMUSCULAR EVERY 12 HOURS
Refills: 0 | Status: COMPLETED | OUTPATIENT
Start: 2020-06-14 | End: 2020-06-15

## 2020-06-14 RX ORDER — FUROSEMIDE 40 MG
40 TABLET ORAL DAILY
Refills: 0 | Status: DISCONTINUED | OUTPATIENT
Start: 2020-06-14 | End: 2020-06-17

## 2020-06-14 RX ADMIN — Medication 2 PUFF(S): at 06:30

## 2020-06-14 RX ADMIN — Medication 2 PUFF(S): at 22:02

## 2020-06-14 RX ADMIN — ALBUTEROL 2 PUFF(S): 90 AEROSOL, METERED ORAL at 08:10

## 2020-06-14 RX ADMIN — MIDODRINE HYDROCHLORIDE 10 MILLIGRAM(S): 2.5 TABLET ORAL at 06:02

## 2020-06-14 RX ADMIN — MIDODRINE HYDROCHLORIDE 10 MILLIGRAM(S): 2.5 TABLET ORAL at 11:41

## 2020-06-14 RX ADMIN — CALCITONIN SALMON 370 INTERNATIONAL UNIT(S): 200 INJECTION, SOLUTION INTRAMUSCULAR at 21:56

## 2020-06-14 RX ADMIN — SODIUM CHLORIDE 75 MILLILITER(S): 9 INJECTION, SOLUTION INTRAVENOUS at 08:29

## 2020-06-14 RX ADMIN — SODIUM CHLORIDE 75 MILLILITER(S): 9 INJECTION, SOLUTION INTRAVENOUS at 21:57

## 2020-06-14 RX ADMIN — Medication 2 PUFF(S): at 15:39

## 2020-06-14 RX ADMIN — CALCITONIN SALMON 370 INTERNATIONAL UNIT(S): 200 INJECTION, SOLUTION INTRAMUSCULAR at 08:29

## 2020-06-14 RX ADMIN — Medication 25 MICROGRAM(S): at 06:02

## 2020-06-14 RX ADMIN — APIXABAN 5 MILLIGRAM(S): 2.5 TABLET, FILM COATED ORAL at 17:01

## 2020-06-14 RX ADMIN — MIDODRINE HYDROCHLORIDE 10 MILLIGRAM(S): 2.5 TABLET ORAL at 17:00

## 2020-06-14 RX ADMIN — APIXABAN 5 MILLIGRAM(S): 2.5 TABLET, FILM COATED ORAL at 06:01

## 2020-06-14 RX ADMIN — ALBUTEROL 2 PUFF(S): 90 AEROSOL, METERED ORAL at 15:39

## 2020-06-14 RX ADMIN — CHLORHEXIDINE GLUCONATE 1 APPLICATION(S): 213 SOLUTION TOPICAL at 06:03

## 2020-06-14 RX ADMIN — Medication 40 MILLIGRAM(S): at 08:28

## 2020-06-14 RX ADMIN — Medication 0: at 06:02

## 2020-06-14 RX ADMIN — CHLORHEXIDINE GLUCONATE 15 MILLILITER(S): 213 SOLUTION TOPICAL at 06:01

## 2020-06-14 RX ADMIN — ALBUTEROL 2 PUFF(S): 90 AEROSOL, METERED ORAL at 22:02

## 2020-06-14 RX ADMIN — Medication 2 PUFF(S): at 08:10

## 2020-06-14 RX ADMIN — POTASSIUM PHOSPHATE, MONOBASIC POTASSIUM PHOSPHATE, DIBASIC 62.5 MILLIMOLE(S): 236; 224 INJECTION, SOLUTION INTRAVENOUS at 08:35

## 2020-06-14 RX ADMIN — ALBUTEROL 2 PUFF(S): 90 AEROSOL, METERED ORAL at 06:30

## 2020-06-14 RX ADMIN — FAMOTIDINE 20 MILLIGRAM(S): 10 INJECTION INTRAVENOUS at 16:25

## 2020-06-14 NOTE — PROGRESS NOTE ADULT - ASSESSMENT
Physical Exam  Gen:  Not in distress, awake and following commands  ENT:  Orally intubated NC/AT, no JVD noted  Thorax:  Symmetric, no retractions  Lung:  Scattered rhonchi throughout, no wheezing   CV:  S1, S2. RRR  Abd:  Soft, NT/ND.  BS normoactive, no masses to palp  Extrem:  No C/C/E, DP/radial pulses +2  Neuro:  A&O x 3, no gross motor/sensory deficits    Assessment:  1. Acute hypercapnic respiratory failure  2. Heart failure with reduced EF  3. REY on CKD stage 4  4. Hypercalcemia   5. Afib  6. DM type 2  7. Hypothyroidism  8. COVID19 infection  9. Metabolic encephalopathy    Plan  - COVID 19 + since the beginning of pandemic per family, at this time afebrile, CXR not suggestive of viral pneumonia  - Overall in negative fluid balance  - Tolerating PS 5/5 ABG seems acceptable on PS so will extubate to NC  - Monitor strict I&O, cont. lasix for diuresis   - Echo noted with depressed EF  - Cardiology eval pending  - Nephrology consult appreciated  - Supplement phos  - Cont calcitonin, will follow calcium level which are downtrending  - work up for hypercalcemia sent thus far normal PTH and Vitamin D level  - Cont. Anticoagulation for afib  - Fingerstick glucose monitoring with coverage for hyperglycemia  - Will evaluate for feeding post extubation  - Jose Hernandez [phone] - message left for call back  - Will need discussion about code status, as previously patient has verbalized DNI status to the son, will place palliative care consult

## 2020-06-14 NOTE — PROGRESS NOTE ADULT - SUBJECTIVE AND OBJECTIVE BOX
Follow-up Critical Care Progress Note  Chief Complaint : COVID-19    Awake, following commands, not in distress.     Allergies :No Known Allergies    PAST MEDICAL & SURGICAL HISTORY:  GERD (gastroesophageal reflux disease)  Chronic kidney disease (CKD): Baseline creatinine 1.6  Type 2 diabetes mellitus  Heart failure, systolic and diastolic  Sciatica  Atrial fibrillation: Status post cardioversion  HTN (hypertension)  Neuropathy  Hypothyroid  COVID-19  Acute kidney failure  Artificial pacemaker      Medications:  MEDICATIONS  (STANDING):  ALBUTerol    90 MICROgram(s) HFA Inhaler 2 Puff(s) Inhalation every 6 hours  apixaban 5 milliGRAM(s) Oral every 12 hours  calcitonin Injectable 370 International Unit(s) IntraMuscular every 12 hours  chlorhexidine 0.12% Liquid 15 milliLiter(s) Oral Mucosa every 12 hours  chlorhexidine 4% Liquid 1 Application(s) Topical <User Schedule>  dextrose 5% + sodium chloride 0.45%. 1000 milliLiter(s) (75 mL/Hr) IV Continuous <Continuous>  dextrose 5%. 1000 milliLiter(s) (50 mL/Hr) IV Continuous <Continuous>  dextrose 50% Injectable 12.5 Gram(s) IV Push once  dextrose 50% Injectable 25 Gram(s) IV Push once  dextrose 50% Injectable 25 Gram(s) IV Push once  famotidine    Tablet 20 milliGRAM(s) Oral daily  furosemide   Injectable 40 milliGRAM(s) IV Push daily  insulin lispro (HumaLOG) corrective regimen sliding scale   SubCutaneous every 6 hours  ipratropium 17 MICROgram(s) HFA Inhaler 2 Puff(s) Inhalation every 6 hours  levothyroxine 25 MICROGram(s) Oral daily  midodrine. 10 milliGRAM(s) Oral three times a day    MEDICATIONS  (PRN):  acetaminophen    Suspension .. 650 milliGRAM(s) Oral every 6 hours PRN Temp greater or equal to 38C (100.4F), Mild Pain (1 - 3)    LABS:                        10.0   11.48 )-----------( 251      ( 14 Jun 2020 05:00 )             33.8     06-14    147<H>  |  108  |  32<H>  ----------------------------<  110<H>  4.0   |  34<H>  |  1.66<H>    Ca    11.6<H>      14 Jun 2020 05:00  Phos  1.6     06-14  Mg     1.6     06-14    TPro  5.9<L>  /  Alb  2.2<L>  /  TBili  0.4  /  DBili  x   /  AST  15  /  ALT  9<L>  /  AlkPhos  65  06-14    HIT ab -- 06-12 @ 08:00  HIT ab EIA --  D Dimer -464    CARDIAC MARKERS ( 12 Jun 2020 16:57 )  .069 ng/mL / x     / x     / x     / x        Procalcitonin, Serum: 0.24 ng/mL (06-12-20 @ 08:00)    Serum Pro-Brain Natriuretic Peptide: >24539 pg/mL (06-12-20 @ 13:30)    CULTURES: (if applicable)    Culture - Urine (collected 06-12-20 @ 08:50)  Source: .Urine Clean Catch (Midstream)  Final Report (06-13-20 @ 10:25):    No growth    Culture - Blood (collected 06-12-20 @ 08:00)  Source: .Blood Blood-Peripheral  Preliminary Report (06-13-20 @ 12:01):    No growth to date.    Culture - Blood (collected 06-12-20 @ 08:00)  Source: .Blood Blood-Peripheral  Preliminary Report (06-13-20 @ 12:01):    No growth to date.    ABG - ( 14 Jun 2020 08:10 )  pH, Arterial: 7.41  pH, Blood: x     /  pCO2: 53    /  pO2: 117   / HCO3: x     / Base Excess: x     /  SaO2: 99        VITALS:  T(C): 37.4 (06-14-20 @ 03:00), Max: 37.5 (06-13-20 @ 19:00)  T(F): 99.4 (06-14-20 @ 03:00), Max: 99.5 (06-13-20 @ 19:00)  HR: 75 (06-14-20 @ 08:19) (75 - 86)  BP: 103/58 (06-14-20 @ 08:00) (82/62 - 129/70)  BP(mean): 72 (06-14-20 @ 08:00) (63 - 91)  ABP: --  ABP(mean): --  RR: 20 (06-14-20 @ 08:00) (14 - 28)  SpO2: 99% (06-14-20 @ 08:19) (98% - 100%)  CVP(mm Hg): --  CVP(cm H2O): --    Ins and Outs     06-13-20 @ 07:01  -  06-14-20 @ 07:00  --------------------------------------------------------  IN: 1770 mL / OUT: 3585 mL / NET: -1815 mL    06-14-20 @ 07:01  -  06-14-20 @ 10:09  --------------------------------------------------------  IN: 62.5 mL / OUT: 0 mL / NET: 62.5 mL    Height (cm): 180.3 (06-12-20 @ 11:00)  Weight (kg): 92.5 (06-12-20 @ 11:00)  BMI (kg/m2): 28.5 (06-12-20 @ 11:00)    Device: 840, Mode: CPAP with PS, RR (patient): 24, TV (patient): 584, FiO2: 30, PEEP: 5, PS: 5, MAP: 7.3, PIP: 11    I&O's Detail    13 Jun 2020 07:01  -  14 Jun 2020 07:00  --------------------------------------------------------  IN:    dextrose 5%.: 550 mL    Enteral Tube Flush: 240 mL    Glucerna 1.5: 530 mL    Solution: 200 mL    Solution: 250 mL  Total IN: 1770 mL    OUT:    Indwelling Catheter - Urethral: 3585 mL  Total OUT: 3585 mL    Total NET: -1815 mL      14 Jun 2020 07:01  -  14 Jun 2020 10:09  --------------------------------------------------------  IN:    Solution: 62.5 mL  Total IN: 62.5 mL    OUT:  Total OUT: 0 mL    Total NET: 62.5 mL

## 2020-06-15 LAB
% ALBUMIN: 48.5 % — SIGNIFICANT CHANGE UP
% ALPHA 1: 7.2 % — SIGNIFICANT CHANGE UP
% ALPHA 2: 12.5 % — SIGNIFICANT CHANGE UP
% BETA: 12.4 % — SIGNIFICANT CHANGE UP
% GAMMA: 19.4 % — SIGNIFICANT CHANGE UP
ACE SERPL-CCNC: 38 U/L — SIGNIFICANT CHANGE UP (ref 14–82)
ALBUMIN SERPL ELPH-MCNC: 2.1 G/DL — LOW (ref 3.3–5)
ALBUMIN SERPL ELPH-MCNC: 2.7 G/DL — LOW (ref 3.6–5.5)
ALBUMIN/GLOB SERPL ELPH: 0.9 RATIO — SIGNIFICANT CHANGE UP
ALP SERPL-CCNC: 62 U/L — SIGNIFICANT CHANGE UP (ref 40–120)
ALPHA1 GLOB SERPL ELPH-MCNC: 0.4 G/DL — SIGNIFICANT CHANGE UP (ref 0.1–0.4)
ALPHA2 GLOB SERPL ELPH-MCNC: 0.7 G/DL — SIGNIFICANT CHANGE UP (ref 0.5–1)
ALT FLD-CCNC: 10 U/L — SIGNIFICANT CHANGE UP (ref 10–45)
ANION GAP SERPL CALC-SCNC: 5 MMOL/L — SIGNIFICANT CHANGE UP (ref 5–17)
AST SERPL-CCNC: 18 U/L — SIGNIFICANT CHANGE UP (ref 10–40)
B-GLOBULIN SERPL ELPH-MCNC: 0.7 G/DL — SIGNIFICANT CHANGE UP (ref 0.5–1)
BASOPHILS # BLD AUTO: 0.04 K/UL — SIGNIFICANT CHANGE UP (ref 0–0.2)
BASOPHILS NFR BLD AUTO: 0.4 % — SIGNIFICANT CHANGE UP (ref 0–2)
BILIRUB SERPL-MCNC: 0.5 MG/DL — SIGNIFICANT CHANGE UP (ref 0.2–1.2)
BUN SERPL-MCNC: 28 MG/DL — HIGH (ref 7–23)
CALCIUM SERPL-MCNC: 10.5 MG/DL — SIGNIFICANT CHANGE UP (ref 8.4–10.5)
CHLORIDE SERPL-SCNC: 105 MMOL/L — SIGNIFICANT CHANGE UP (ref 96–108)
CK SERPL-CCNC: 37 U/L — SIGNIFICANT CHANGE UP (ref 30–200)
CO2 SERPL-SCNC: 35 MMOL/L — HIGH (ref 22–31)
CREAT SERPL-MCNC: 1.23 MG/DL — SIGNIFICANT CHANGE UP (ref 0.5–1.3)
D DIMER BLD IA.RAPID-MCNC: 339 NG/ML DDU — HIGH
EOSINOPHIL # BLD AUTO: 0.45 K/UL — SIGNIFICANT CHANGE UP (ref 0–0.5)
EOSINOPHIL NFR BLD AUTO: 4.5 % — SIGNIFICANT CHANGE UP (ref 0–6)
FERRITIN SERPL-MCNC: 341 NG/ML — SIGNIFICANT CHANGE UP (ref 30–400)
GAMMA GLOBULIN: 1.1 G/DL — SIGNIFICANT CHANGE UP (ref 0.6–1.6)
GLUCOSE BLDC GLUCOMTR-MCNC: 101 MG/DL — HIGH (ref 70–99)
GLUCOSE BLDC GLUCOMTR-MCNC: 87 MG/DL — SIGNIFICANT CHANGE UP (ref 70–99)
GLUCOSE BLDC GLUCOMTR-MCNC: 91 MG/DL — SIGNIFICANT CHANGE UP (ref 70–99)
GLUCOSE SERPL-MCNC: 88 MG/DL — SIGNIFICANT CHANGE UP (ref 70–99)
HCT VFR BLD CALC: 34.8 % — LOW (ref 39–50)
HGB BLD-MCNC: 10.2 G/DL — LOW (ref 13–17)
IMM GRANULOCYTES NFR BLD AUTO: 0.4 % — SIGNIFICANT CHANGE UP (ref 0–1.5)
INTERPRETATION SERPL IFE-IMP: SIGNIFICANT CHANGE UP
LYMPHOCYTES # BLD AUTO: 1.42 K/UL — SIGNIFICANT CHANGE UP (ref 1–3.3)
LYMPHOCYTES # BLD AUTO: 14.3 % — SIGNIFICANT CHANGE UP (ref 13–44)
MAGNESIUM SERPL-MCNC: 1.3 MG/DL — LOW (ref 1.6–2.6)
MCHC RBC-ENTMCNC: 28.8 PG — SIGNIFICANT CHANGE UP (ref 27–34)
MCHC RBC-ENTMCNC: 29.3 GM/DL — LOW (ref 32–36)
MCV RBC AUTO: 98.3 FL — SIGNIFICANT CHANGE UP (ref 80–100)
MONOCYTES # BLD AUTO: 0.88 K/UL — SIGNIFICANT CHANGE UP (ref 0–0.9)
MONOCYTES NFR BLD AUTO: 8.9 % — SIGNIFICANT CHANGE UP (ref 2–14)
NEUTROPHILS # BLD AUTO: 7.07 K/UL — SIGNIFICANT CHANGE UP (ref 1.8–7.4)
NEUTROPHILS NFR BLD AUTO: 71.5 % — SIGNIFICANT CHANGE UP (ref 43–77)
NRBC # BLD: 0 /100 WBCS — SIGNIFICANT CHANGE UP (ref 0–0)
NT-PROBNP SERPL-SCNC: HIGH PG/ML (ref 0–300)
PHOSPHATE SERPL-MCNC: 2.2 MG/DL — LOW (ref 2.5–4.5)
PLATELET # BLD AUTO: 230 K/UL — SIGNIFICANT CHANGE UP (ref 150–400)
POTASSIUM SERPL-MCNC: 3.7 MMOL/L — SIGNIFICANT CHANGE UP (ref 3.5–5.3)
POTASSIUM SERPL-SCNC: 3.7 MMOL/L — SIGNIFICANT CHANGE UP (ref 3.5–5.3)
PROT PATTERN SERPL ELPH-IMP: SIGNIFICANT CHANGE UP
PROT SERPL-MCNC: 6 G/DL — SIGNIFICANT CHANGE UP (ref 6–8.3)
RBC # BLD: 3.54 M/UL — LOW (ref 4.2–5.8)
RBC # FLD: 19.2 % — HIGH (ref 10.3–14.5)
SODIUM SERPL-SCNC: 145 MMOL/L — SIGNIFICANT CHANGE UP (ref 135–145)
TROPONIN I SERPL-MCNC: 0.03 NG/ML — SIGNIFICANT CHANGE UP (ref 0.02–0.06)
WBC # BLD: 9.9 K/UL — SIGNIFICANT CHANGE UP (ref 3.8–10.5)
WBC # FLD AUTO: 9.9 K/UL — SIGNIFICANT CHANGE UP (ref 3.8–10.5)

## 2020-06-15 PROCEDURE — 93010 ELECTROCARDIOGRAM REPORT: CPT

## 2020-06-15 PROCEDURE — 99497 ADVNCD CARE PLAN 30 MIN: CPT | Mod: 25

## 2020-06-15 PROCEDURE — 99222 1ST HOSP IP/OBS MODERATE 55: CPT

## 2020-06-15 PROCEDURE — 99223 1ST HOSP IP/OBS HIGH 75: CPT

## 2020-06-15 RX ORDER — MAGNESIUM SULFATE 500 MG/ML
1 VIAL (ML) INJECTION ONCE
Refills: 0 | Status: COMPLETED | OUTPATIENT
Start: 2020-06-15 | End: 2020-06-15

## 2020-06-15 RX ORDER — MAGNESIUM SULFATE 500 MG/ML
1 VIAL (ML) INJECTION
Refills: 0 | Status: COMPLETED | OUTPATIENT
Start: 2020-06-15 | End: 2020-06-15

## 2020-06-15 RX ORDER — MIDODRINE HYDROCHLORIDE 2.5 MG/1
5 TABLET ORAL THREE TIMES A DAY
Refills: 0 | Status: DISCONTINUED | OUTPATIENT
Start: 2020-06-15 | End: 2020-06-18

## 2020-06-15 RX ADMIN — Medication 100 GRAM(S): at 07:26

## 2020-06-15 RX ADMIN — Medication 100 GRAM(S): at 09:30

## 2020-06-15 RX ADMIN — ALBUTEROL 2 PUFF(S): 90 AEROSOL, METERED ORAL at 10:31

## 2020-06-15 RX ADMIN — Medication 2 PUFF(S): at 05:09

## 2020-06-15 RX ADMIN — Medication 2 PUFF(S): at 21:46

## 2020-06-15 RX ADMIN — Medication 40 MILLIGRAM(S): at 05:06

## 2020-06-15 RX ADMIN — Medication 2 PUFF(S): at 10:29

## 2020-06-15 RX ADMIN — ALBUTEROL 2 PUFF(S): 90 AEROSOL, METERED ORAL at 05:09

## 2020-06-15 RX ADMIN — MIDODRINE HYDROCHLORIDE 10 MILLIGRAM(S): 2.5 TABLET ORAL at 11:01

## 2020-06-15 RX ADMIN — APIXABAN 5 MILLIGRAM(S): 2.5 TABLET, FILM COATED ORAL at 17:14

## 2020-06-15 RX ADMIN — ALBUTEROL 2 PUFF(S): 90 AEROSOL, METERED ORAL at 21:46

## 2020-06-15 RX ADMIN — ALBUTEROL 2 PUFF(S): 90 AEROSOL, METERED ORAL at 16:47

## 2020-06-15 RX ADMIN — CALCITONIN SALMON 370 INTERNATIONAL UNIT(S): 200 INJECTION, SOLUTION INTRAMUSCULAR at 17:31

## 2020-06-15 RX ADMIN — FAMOTIDINE 20 MILLIGRAM(S): 10 INJECTION INTRAVENOUS at 11:02

## 2020-06-15 RX ADMIN — Medication 0: at 23:56

## 2020-06-15 RX ADMIN — MIDODRINE HYDROCHLORIDE 5 MILLIGRAM(S): 2.5 TABLET ORAL at 17:14

## 2020-06-15 RX ADMIN — Medication 100 GRAM(S): at 06:33

## 2020-06-15 RX ADMIN — CHLORHEXIDINE GLUCONATE 1 APPLICATION(S): 213 SOLUTION TOPICAL at 05:07

## 2020-06-15 RX ADMIN — Medication 100 GRAM(S): at 08:42

## 2020-06-15 RX ADMIN — Medication 2 PUFF(S): at 16:48

## 2020-06-15 RX ADMIN — CALCITONIN SALMON 370 INTERNATIONAL UNIT(S): 200 INJECTION, SOLUTION INTRAMUSCULAR at 05:38

## 2020-06-15 NOTE — SWALLOW BEDSIDE ASSESSMENT ADULT - PHARYNGEAL PHASE
Delayed pharyngeal swallow/Cough post oral intake/Wet vocal quality post oral intake Delayed pharyngeal swallow

## 2020-06-15 NOTE — CONSULT NOTE ADULT - SUBJECTIVE AND OBJECTIVE BOX
HPI:  75 year old male with extensive PMH including CAD, systolic/diastolic heart failure status post AICD, atrial fibrillation status post cardioversion, COPD, type 2 DM, CKD, hypothyroidism who presented to [facility] from [facility],  where pt was for [facility], with altered mental status.  ABG revealed acute hypercarbic respiratory failure and patient was intubated, COVID positive.  Transferred to ICU for further care.  S/p intubation, extubated yesterday, 6/14, cannot elicit further history from patient, due to AMS.  Of note, son spoke to patient via phone one day PTA,  and noted he seemed short of breath at rest with difficulty completing sentences, patient last intubated 11/19 because "he had heart failure and his carbon dioxide levels went very high," patient has been alternately hospitalized or at rehab since that time.       PAST MEDICAL & SURGICAL HISTORY:  GERD (gastroesophageal reflux disease)  Chronic kidney disease (CKD): Baseline creatinine 1.6  Type 2 diabetes mellitus  Heart failure, systolic and diastolic  Sciatica  Atrial fibrillation: Status post cardioversion  HTN (hypertension)  Neuropathy  Hypothyroid  COVID-19  Acute kidney failure  Artificial pacemaker      SOCIAL HISTORY:    Admitted from:   [facility]    Substance abuse history:              Tobacco hx:                  Alcohol hx:              Home Opioid hx:  Oriental orthodox:                                    Preferred Language:    HCP/:  irma Garcia               Phone#:  [phone]      FAMILY HISTORY:    Baseline ADLs (prior to admission):    Allergies    No Known Allergies    Intolerances      Present Symptoms:   Dyspnea: yes  Nausea/Vomiting:   Anxiety:  Depressed   Fatigue: yes  Loss of appetite:   Pain:           no                     location:          Review of Systems: Unable to obtain due to poor mentation]    MEDICATIONS  (STANDING):  ALBUTerol    90 MICROgram(s) HFA Inhaler 2 Puff(s) Inhalation every 6 hours  apixaban 5 milliGRAM(s) Oral every 12 hours  calcitonin Injectable 370 International Unit(s) IntraMuscular every 12 hours  chlorhexidine 4% Liquid 1 Application(s) Topical <User Schedule>  dextrose 5% + sodium chloride 0.45%. 1000 milliLiter(s) (50 mL/Hr) IV Continuous <Continuous>  dextrose 5%. 1000 milliLiter(s) (50 mL/Hr) IV Continuous <Continuous>  dextrose 50% Injectable 12.5 Gram(s) IV Push once  dextrose 50% Injectable 25 Gram(s) IV Push once  dextrose 50% Injectable 25 Gram(s) IV Push once  famotidine    Tablet 20 milliGRAM(s) Oral daily  furosemide   Injectable 40 milliGRAM(s) IV Push daily  insulin lispro (HumaLOG) corrective regimen sliding scale   SubCutaneous every 6 hours  ipratropium 17 MICROgram(s) HFA Inhaler 2 Puff(s) Inhalation every 6 hours  levothyroxine 25 MICROGram(s) Oral daily  midodrine. 5 milliGRAM(s) Oral three times a day    MEDICATIONS  (PRN):  acetaminophen    Suspension .. 650 milliGRAM(s) Oral every 6 hours PRN Temp greater or equal to 38C (100.4F), Mild Pain (1 - 3)      PHYSICAL EXAM:    Vital Signs Last 24 Hrs  T(C): 37 (15 Osmar 2020 11:00), Max: 37 (15 Osmar 2020 07:00)  T(F): 98.6 (15 Osmar 2020 11:00), Max: 98.6 (15 Osmar 2020 07:00)  HR: 80 (15 Osmar 2020 12:00) (76 - 90)  BP: 104/70 (15 Osmar 2020 12:00) (79/40 - 124/48)  BP(mean): 81 (15 Osmar 2020 12:00) (54 - 86)  RR: 18 (15 Osmar 2020 12:00) (15 - 28)  SpO2: 100% (15 Osmar 2020 12:00) (99% - 100%)    General: alert  oriented x 1-2_lethargic,  verbal]  Karnofsky Performance Score/Palliative Performance Status Version2: 40    %  Neuro: some cognitive impairment   Oral intake ability:  moderate  capability]  Diet: puree w/ nectar    LABS:                        10.2   9.90  )-----------( 230      ( 15 Osmar 2020 05:30 )             34.8     06-15    145  |  105  |  28<H>  ----------------------------<  88  3.7   |  35<H>  |  1.23    Ca    10.5      15 Osmar 2020 05:30  Phos  2.2     06-15  Mg     1.3     06-15    TPro  6.0  /  Alb  2.1<L>  /  TBili  0.5  /  DBili  x   /  AST  18  /  ALT  10  /  AlkPhos  62  06-15        RADIOLOGY & ADDITIONAL STUDIES:< from: Xray Chest 1 View- PORTABLE-Urgent (06.12.20 @ 10:03) >  EXAM:  XR CHEST PORTABLE URGENT 1V      PROCEDURE DATE:  06/12/2020        INTERPRETATION:  Tube placement.    AP chest. Prior earlier same day.    Impression: Endotracheal tube nasogastric tube are in satisfactory position. Left AICD reidentified in situ. No pneumothorax or other gross change heart and lungs.          ADVANCE DIRECTIVES: HCP MOLST  Full Code   Advanced Care Planning discussion total time spent:

## 2020-06-15 NOTE — CONSULT NOTE ADULT - ASSESSMENT
A/P 75 year old male with extensive PMH including CAD, systolic/diastolic heart failure status post AICD, atrial fibrillation status post cardioversion, COPD, type 2 DM, CKD, hypothyroidism who presented to [facility] from [facility] with altered mental status.  ABG revealed acute hypercarbic respiratory failure and patient was intubated, COVID positive.      Assessment:  1. Acute hypercapnic respiratory failure Resolved, Extubated 6/14, non smoker , 911 Exposure - on Registry   2. Acute on chronic Heart failure with reduced EF  3. REY on CKD stage 4 Improving  4. Hypercalcemia improving  5. Afib  6. DM type 2  7. Hypothyroidism - Improving  8. + COVID19 infection  9. Metabolic encephalopathy    Plan  - COVID 19 + since the beginning of pandemic per family, at this time afebrile, CXR not suggestive of viral pneumonia  - Overall in negative fluid balance and improving  - n/c o2, continue IVF with lasix for hypercalcemia, Renal f/u   - Echo noted with depressed EF  - Cardiology eval pending  - Nephrology f/u  - Supplement phos  - Cont calcitonin, will follow calcium level which are downtrending, last day today   - work up for hypercalcemia sent thus far normal PTH and Vitamin D level  - Cont. Anticoagulation for afib with eliquis  - Fingerstick glucose monitoring with coverage for hyperglycemia  - D/w S&S will advance diet.   - Check Trop, EKG, ABG    Palliative :  asked for GOC, case d/w ccu team, Dr David, and chart reviewed. Pt is awake, alert to self, speaks few words, but unable to state place or year. Mostly drowsy currently, but looks comfortable, no sob or s/s discomfort.   Called and spoke to pts Son Jose who is also pts HCP. He was updated medically this AM by ccu team.  We discussed his dads medical history and his recent course of  hospitalizations as well as his stay in  for STR. The plan was for his Dad to get stronger physically and return home. According to Jose his Dads mental status was very good prior to " all of this"   They remain hopeful for a full  recovery and for him to return home.  We spoke about advanced directives, Son says he has a HCP form , I asked him to email that to me so I can put it in his Dads records. He said he would  do so. I asked about CPR and intubation, and at this time son feels he would want those  interventions done again. However, he did say if his Dad's condition worsens or if his mental status does not improve soon, he will re-visit this conversation with me.  So pt to remain Full Code at this time .   He has my contact information for questions. Plan is to speak again this week to review his dads status at that time.  Reviewed w/ ccu Dr David.   Palliative will  follow w/ ccu team.

## 2020-06-15 NOTE — PROGRESS NOTE ADULT - SUBJECTIVE AND OBJECTIVE BOX
Resting    Vital Signs Last 24 Hrs  T(C): 37 (06-15-20 @ 11:00), Max: 37 (06-15-20 @ 07:00)  T(F): 98.6 (06-15-20 @ 11:00), Max: 98.6 (06-15-20 @ 07:00)  HR: 80 (06-15-20 @ 14:00) (76 - 90)  BP: 117/78 (06-15-20 @ 14:00) (79/40 - 124/48)  BP(mean): 88 (06-15-20 @ 14:00) (54 - 91)  RR: 20 (06-15-20 @ 14:00) (15 - 28)  SpO2: 100% (06-15-20 @ 14:00) (99% - 100%)    I&O's Detail    14 Jun 2020 07:01  -  15 Osmar 2020 07:00  --------------------------------------------------------  IN:    dextrose 5% + sodium chloride 0.45%.: 1575 mL    Solution: 250 mL  Total IN: 1825 mL    OUT:    Indwelling Catheter - Urethral: 1450 mL    Voided: 2400 mL  Total OUT: 3850 mL    Total NET: -2025 mL    15 Osmar 2020 07:01  -  15 Osmar 2020 14:25  --------------------------------------------------------  IN:    dextrose 5% + sodium chloride 0.45%.: 525 mL  Total IN: 525 mL    OUT:    Indwelling Catheter - Urethral: 1200 mL  Total OUT: 1200 mL    Total NET: -675 mL    s1s2  coarse BS  soft  sm edema                        10.2   9.90  )-----------( 230      ( 15 Osmar 2020 05:30 )             34.8     15 Osmar 2020 05:30    145    |  105    |  28     ----------------------------<  88     3.7     |  35     |  1.23     Ca    10.5       15 Osmar 2020 05:30  Phos  2.2       15 Osmar 2020 05:30  Mg     1.3       15 Osmar 2020 05:30    TPro  6.0    /  Alb  2.1    /  TBili  0.5    /  DBili  x      /  AST  18     /  ALT  10     /  AlkPhos  62     15 Osmar 2020 05:30    LIVER FUNCTIONS - ( 15 Osmar 2020 05:30 )  Alb: 2.1 g/dL / Pro: 6.0 g/dL / ALK PHOS: 62 U/L / ALT: 10 U/L / AST: 18 U/L / GGT: x           CARDIAC MARKERS ( 15 Osmar 2020 05:30 )  .033 ng/mL / x     / 37 U/L / x     / x        acetaminophen    Suspension .. 650 milliGRAM(s) Oral every 6 hours PRN  ALBUTerol    90 MICROgram(s) HFA Inhaler 2 Puff(s) Inhalation every 6 hours  apixaban 5 milliGRAM(s) Oral every 12 hours  calcitonin Injectable 370 International Unit(s) IntraMuscular every 12 hours  chlorhexidine 4% Liquid 1 Application(s) Topical <User Schedule>  dextrose 5% + sodium chloride 0.45%. 1000 milliLiter(s) IV Continuous <Continuous>  dextrose 5%. 1000 milliLiter(s) IV Continuous <Continuous>  dextrose 50% Injectable 12.5 Gram(s) IV Push once  dextrose 50% Injectable 25 Gram(s) IV Push once  dextrose 50% Injectable 25 Gram(s) IV Push once  famotidine    Tablet 20 milliGRAM(s) Oral daily  furosemide   Injectable 40 milliGRAM(s) IV Push daily  insulin lispro (HumaLOG) corrective regimen sliding scale   SubCutaneous every 6 hours  ipratropium 17 MICROgram(s) HFA Inhaler 2 Puff(s) Inhalation every 6 hours  levothyroxine 25 MICROGram(s) Oral daily  midodrine. 5 milliGRAM(s) Oral three times a day    A/P:    Severe CM, EF 25-30%, mod TR/AS  Adm w/resp failure, CHF/COPD exacerbation, COVID +  REY has improved  Good UO  Neely, I/Os  Avoid nephrotoxins  Non-PTH hypercalcemia, etiology not clear  Will f/u PTHrP, SPEP  Continue Lasix  Avoid hypotension  Will follow    [phone]
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
## 2020-06-15 NOTE — PROGRESS NOTE ADULT - SUBJECTIVE AND OBJECTIVE BOX
Follow-up Critical Care Progress Note  Chief Complaint : COVID-19      pt on n/c  comfortable  no cp, sob, palp, n/v      Allergies :No Known Allergies      PAST MEDICAL & SURGICAL HISTORY:  GERD (gastroesophageal reflux disease)  Chronic kidney disease (CKD): Baseline creatinine 1.6  Type 2 diabetes mellitus  Heart failure, systolic and diastolic  Sciatica  Atrial fibrillation: Status post cardioversion  HTN (hypertension)  Neuropathy  Hypothyroid  COVID-19  Acute kidney failure  Artificial pacemaker      Medications:  MEDICATIONS  (STANDING):  ALBUTerol    90 MICROgram(s) HFA Inhaler 2 Puff(s) Inhalation every 6 hours  apixaban 5 milliGRAM(s) Oral every 12 hours  calcitonin Injectable 370 International Unit(s) IntraMuscular every 12 hours  chlorhexidine 4% Liquid 1 Application(s) Topical <User Schedule>  dextrose 5% + sodium chloride 0.45%. 1000 milliLiter(s) (75 mL/Hr) IV Continuous <Continuous>  dextrose 5%. 1000 milliLiter(s) (50 mL/Hr) IV Continuous <Continuous>  dextrose 50% Injectable 12.5 Gram(s) IV Push once  dextrose 50% Injectable 25 Gram(s) IV Push once  dextrose 50% Injectable 25 Gram(s) IV Push once  famotidine    Tablet 20 milliGRAM(s) Oral daily  furosemide   Injectable 40 milliGRAM(s) IV Push daily  insulin lispro (HumaLOG) corrective regimen sliding scale   SubCutaneous every 6 hours  ipratropium 17 MICROgram(s) HFA Inhaler 2 Puff(s) Inhalation every 6 hours  levothyroxine 25 MICROGram(s) Oral daily  midodrine. 10 milliGRAM(s) Oral three times a day    MEDICATIONS  (PRN):  acetaminophen    Suspension .. 650 milliGRAM(s) Oral every 6 hours PRN Temp greater or equal to 38C (100.4F), Mild Pain (1 - 3)      LABS:                        10.2   9.90  )-----------( 230      ( 15 Osmar 2020 05:30 )             34.8     06-15    145  |  105  |  28<H>  ----------------------------<  88  3.7   |  35<H>  |  1.23    Ca    10.5      15 Osmar 2020 05:30  Phos  2.2     06-15  Mg     1.3     06-15    TPro  6.0  /  Alb  2.1<L>  /  TBili  0.5  /  DBili  x   /  AST  18  /  ALT  10  /  AlkPhos  62  06-15    COVID  06-12-20 @ 08:12  COVID -   Detected<!!>      COVID Biomarkers    06-15-20 @ 05:30 ESR --  ---  CRP --  ---  DDimer  339<H>   ---   LDH --   ---   Ferritin 341    06-12-20 @ 08:00 ESR --  ---  CRP 2.99<H>  ---  DDimer  464<H>   ---   LDH --   ---   Ferritin 394            WBC Trend  06-15-20 @ 05:30   -  9.90  06-14-20 @ 05:00   -  11.48<H>  06-13-20 @ 06:00   -  14.48<H>    H/H Trend  06-15-20 @ 05:30   -  10.2<L> / 34.8<L>  06-14-20 @ 05:00   -  10.0<L> / 33.8<L>  06-13-20 @ 06:00   -  10.6<L> / 36.0<L>    Platelet Trend  06-15-20 @ 05:30   -  230  06-14-20 @ 05:00   -  251  06-13-20 @ 06:00   -  268    Trend Sodium  06-15-20 @ 05:30   -  145  06-14-20 @ 05:00   -  147<H>  06-13-20 @ 15:14   -  146<H>  06-13-20 @ 06:00   -  147<H>  06-12-20 @ 13:30   -  147<H>    Trend Potassium  06-15-20 @ 05:30   -  3.7  06-14-20 @ 05:00   -  4.0  06-13-20 @ 15:14   -  4.6  06-13-20 @ 06:00   -  4.0  06-12-20 @ 13:30   -  4.8    Trend Bun/Cr  06-15-20 @ 05:30  BUN/CR -  28<H> / 1.23  06-14-20 @ 05:00  BUN/CR -  32<H> / 1.66<H>  06-13-20 @ 15:14  BUN/CR -  32<H> / 1.92<H>  06-13-20 @ 06:00  BUN/CR -  29<H> / 2.01<H>  06-12-20 @ 13:30  BUN/CR -  31<H> / 2.16<H>        ABG Trend  06-14-20 @ 08:10   - 7.41/53<H>/117<H>/99<H>  06-13-20 @ 12:05   - 7.35/59<H>/107/98<H>  06-12-20 @ 11:55   - 7.33<L>/51<H>/146<H>/>99<H>  06-12-20 @ 10:02   - 7.33<L>/46/407<H>/>99<H>  06-12-20 @ 08:35   - 7.00<LL>/>106/249<H>/99<H>      Trend Calcium  06-15-20 @ 05:30   -  10.5  06-14-20 @ 05:00   -  11.6<H>  06-13-20 @ 15:14   -  12.0<H>  06-13-20 @ 06:00   -  12.6<H>  06-12-20 @ 13:30   -  13.6<HH>  06-12-20 @ 08:00   -  13.8<HH>    Serum Pro-Brain Natriuretic Peptide: >39773 pg/mL (06-12-20 @ 13:30)    06-12-20 @ 16:57  [id number]/ Trop I -  --  - --  -  --  /  .069<H>      CULTURES: (if applicable)    Culture - Urine (collected 06-12-20 @ 08:50)  Source: .Urine Clean Catch (Midstream)  Final Report (06-13-20 @ 10:25):    No growth    Culture - Blood (collected 06-12-20 @ 08:00)  Source: .Blood Blood-Peripheral  Preliminary Report (06-13-20 @ 12:01):    No growth to date.    Culture - Blood (collected 06-12-20 @ 08:00)  Source: .Blood Blood-Peripheral  Preliminary Report (06-13-20 @ 12:01):    No growth to date.          ABG - ( 14 Jun 2020 08:10 )  pH, Arterial: 7.41  pH, Blood: x     /  pCO2: 53    /  pO2: 117   / HCO3: x     / Base Excess: x     /  SaO2: 99                CAPILLARY BLOOD GLUCOSE      POCT Blood Glucose.: 91 mg/dL (15 Somar 2020 11:08)      RADIOLOGY  CXR:  < from: Xray Chest 1 View- PORTABLE-Urgent (06.12.20 @ 10:03) >    INTERPRETATION:  Tube placement.    AP chest. Prior earlier same day.    Impression: Endotracheal tube nasogastric tube are in satisfactory position. Left AICD reidentified in situ. No pneumothorax or other gross change heart and lungs.    < end of copied text >    ECHO:  < from: TTE Echo Complete w/o Contrast w/ Doppler (06.12.20 @ 13:01) >  Summary:   1. Left ventricular ejection fraction, by visual estimation, is 25 to 30%.   2. Severely decreased global left ventricular systolic function.   3. Mildly increased LV wall thickness.   4. Spectral Doppler shows restrictive pattern of left ventricular myocardial filling (Grade III diastolic dysfunction).   5. There is no evidence of pericardial effusion.   6. Thickening and calcification of the anterior and posterior mitral valve leaflets.   7. Moderate tricuspid regurgitation.   8. Mild aortic regurgitation.   9. Moderate aortic valve stenosis.  10. Structurally normalpulmonic valve, with normal leaflet excursion.  11. Dilatation of the ascending aorta.  12. Estimated pulmonary artery systolic pressure is 63.4 mmHg assuming a right atrial pressure of 10 mmHg, which is consistent with moderate pulmonary hypertension.  13. Peak transaortic gradient equals 11.2 mmHg, mean transaortic gradient equals 6    < end of copied text >      VITALS:  T(C): 37 (06-15-20 @ 07:00), Max: 37 (06-15-20 @ 07:00)  T(F): 98.6 (06-15-20 @ 07:00), Max: 98.6 (06-15-20 @ 07:00)  HR: 84 (06-15-20 @ 10:31) (76 - 90)  BP: 100/55 (06-15-20 @ 10:00) (94/62 - 124/48)  BP(mean): 67 (06-15-20 @ 10:00) (62 - 87)  ABP: --  ABP(mean): --  RR: 19 (06-15-20 @ 10:00) (15 - 28)  SpO2: 100% (06-15-20 @ 10:31) (99% - 100%)  CVP(mm Hg): --  CVP(cm H2O): --    Ins and Outs     06-14-20 @ 07:01  -  06-15-20 @ 07:00  --------------------------------------------------------  IN: 1825 mL / OUT: 3850 mL / NET: -2025 mL                I&O's Detail    14 Jun 2020 07:01  -  15 Osmar 2020 07:00  --------------------------------------------------------  IN:    dextrose 5% + sodium chloride 0.45%.: 1575 mL    Solution: 250 mL  Total IN: 1825 mL    OUT:    Indwelling Catheter - Urethral: 1450 mL    Voided: 2400 mL  Total OUT: 3850 mL    Total NET: -2025 mL

## 2020-06-15 NOTE — CONSULT NOTE ADULT - ASSESSMENT
In summary the patient is a 75 year old man with signifcant cardiac and pulm disease maintaining a bp somewhat over 100 systolic on  midodrine.    For now will be unable to tolerate bblocker ace arbs nitrates or apresoline      if bp improves consider slowly instituting anti chf regimen    for now would repeat cxr and if no significant  chf consider decreasing furosemide

## 2020-06-15 NOTE — PROGRESS NOTE ADULT - ASSESSMENT
Physical Exam  Gen:  Not in distress, awake and following commands  Lung:  Bi lateral air entry, Scattered rhonchi throughout, no wheezing   CV:  S1, S2. RRR  Abd:  Soft, NT/ND.  BS normoactive, no masses to palp  Extrem:  No C/C/E, DP/radial pulses +2  Neuro:  Alert and confused no gross motor/sensory deficits    Assessment:  1. Acute hypercapnic respiratory failure Resolved, Extubated 6/14, non smoker , 911 Exposure - on Registry   2. Acute on chronic Heart failure with reduced EF  3. REY on CKD stage 4 Improving  4. Hypercalcemia improving  5. Afib  6. DM type 2  7. Hypothyroidism - Improving  8. + COVID19 infection  9. Metabolic encephalopathy    Plan  - COVID 19 + since the beginning of pandemic per family, at this time afebrile, CXR not suggestive of viral pneumonia  - Overall in negative fluid balance and improving  - n/c o2, continue IVF with lasix for hypercalcemia, Renal f/u   - Echo noted with depressed EF  - Cardiology eval pending  - Nephrology f/u  - Supplement phos  - Cont calcitonin, will follow calcium level which are downtrending, last day today   - work up for hypercalcemia sent thus far normal PTH and Vitamin D level  - Cont. Anticoagulation for afib with eliquis  - Fingerstick glucose monitoring with coverage for hyperglycemia  - D/w S&S will advance diet.   - Check Trop, EKG, ABG  - will consider transfer to medical floor today   - Jose Hernandez [phone] - Updated 6/15    - palliative care f/u

## 2020-06-15 NOTE — SWALLOW BEDSIDE ASSESSMENT ADULT - SLP PERTINENT HISTORY OF CURRENT PROBLEM
75 year old male with extensive PMH including CAD, systolic/diastolic heart failure status post AICD, atrial fibrillation status post cardioversion, COPD, type 2 DM, CKD, hypothyroidism who presented to [facility] from [facility] with altered mental status.  ABG revealed acute hypercarbic respiratory failure and patient was intubated, COVID positive.  Transferred to ICU for further care.

## 2020-06-15 NOTE — SWALLOW BEDSIDE ASSESSMENT ADULT - COMMENTS
WBC 6/15 - 9.90  CXR 6/12 - Endotracheal nasogastric tube are in satisfactory position. Left AICD reidentified in situ. No pneumothorax or other gross change heart and lungs.  Pt was extubated yesterday 6/14 and was intubated for approximately 2 days.  Pt received Ax0x2, confused.

## 2020-06-15 NOTE — GOALS OF CARE CONVERSATION - ADVANCED CARE PLANNING - CONVERSATION DETAILS
Called and spoke to pts Son Jose who is also pts HCP.   We discussed his dads medical history and his recent course of  hospitalizations as well as his stay in  for STR. The plan was for his Dad to get stronger physically and return home. According to Jose his Dads mental status was very good prior to " all of this"   They remain hopeful for a full  recovery and for him to return home.  We spoke about advanced directives, Son says he has a HCP form , I asked him to email that to me so I can put it in his Dads records. He said he would  do so. I asked about CPR and intubation, and at this time son feels he would want those  interventions done again, and pt currently has a MOLST saying CPR . Pt recently intubated and was extubated yesterday . However, he did say if his Dad's condition worsens or if his mental status does not improve soon, he will re-visit this conversation with me.  So pt to remain Full Code at this time .

## 2020-06-15 NOTE — SWALLOW BEDSIDE ASSESSMENT ADULT - ORAL PHASE
Decreased anterior-posterior movement of the bolus perseverative chew/Decreased anterior-posterior movement of the bolus/Delayed oral transit time

## 2020-06-16 LAB
ALBUMIN SERPL ELPH-MCNC: 2.2 G/DL — LOW (ref 3.3–5)
ALP SERPL-CCNC: 61 U/L — SIGNIFICANT CHANGE UP (ref 40–120)
ALT FLD-CCNC: <6 U/L — LOW (ref 10–45)
ANION GAP SERPL CALC-SCNC: 2 MMOL/L — LOW (ref 5–17)
AST SERPL-CCNC: 15 U/L — SIGNIFICANT CHANGE UP (ref 10–40)
BASOPHILS # BLD AUTO: 0.04 K/UL — SIGNIFICANT CHANGE UP (ref 0–0.2)
BASOPHILS NFR BLD AUTO: 0.5 % — SIGNIFICANT CHANGE UP (ref 0–2)
BILIRUB SERPL-MCNC: 0.8 MG/DL — SIGNIFICANT CHANGE UP (ref 0.2–1.2)
BUN SERPL-MCNC: 24 MG/DL — HIGH (ref 7–23)
CALCIUM SERPL-MCNC: 10 MG/DL — SIGNIFICANT CHANGE UP (ref 8.4–10.5)
CHLORIDE SERPL-SCNC: 103 MMOL/L — SIGNIFICANT CHANGE UP (ref 96–108)
CO2 SERPL-SCNC: 38 MMOL/L — HIGH (ref 22–31)
CREAT SERPL-MCNC: 1.19 MG/DL — SIGNIFICANT CHANGE UP (ref 0.5–1.3)
EOSINOPHIL # BLD AUTO: 0.25 K/UL — SIGNIFICANT CHANGE UP (ref 0–0.5)
EOSINOPHIL NFR BLD AUTO: 3 % — SIGNIFICANT CHANGE UP (ref 0–6)
GLUCOSE BLDC GLUCOMTR-MCNC: 111 MG/DL — HIGH (ref 70–99)
GLUCOSE BLDC GLUCOMTR-MCNC: 74 MG/DL — SIGNIFICANT CHANGE UP (ref 70–99)
GLUCOSE BLDC GLUCOMTR-MCNC: 91 MG/DL — SIGNIFICANT CHANGE UP (ref 70–99)
GLUCOSE BLDC GLUCOMTR-MCNC: 92 MG/DL — SIGNIFICANT CHANGE UP (ref 70–99)
GLUCOSE SERPL-MCNC: 88 MG/DL — SIGNIFICANT CHANGE UP (ref 70–99)
HCT VFR BLD CALC: 36 % — LOW (ref 39–50)
HGB BLD-MCNC: 10.6 G/DL — LOW (ref 13–17)
IMM GRANULOCYTES NFR BLD AUTO: 0.4 % — SIGNIFICANT CHANGE UP (ref 0–1.5)
LYMPHOCYTES # BLD AUTO: 1.3 K/UL — SIGNIFICANT CHANGE UP (ref 1–3.3)
LYMPHOCYTES # BLD AUTO: 15.7 % — SIGNIFICANT CHANGE UP (ref 13–44)
MAGNESIUM SERPL-MCNC: 1.8 MG/DL — SIGNIFICANT CHANGE UP (ref 1.6–2.6)
MCHC RBC-ENTMCNC: 28.7 PG — SIGNIFICANT CHANGE UP (ref 27–34)
MCHC RBC-ENTMCNC: 29.4 GM/DL — LOW (ref 32–36)
MCV RBC AUTO: 97.6 FL — SIGNIFICANT CHANGE UP (ref 80–100)
MONOCYTES # BLD AUTO: 0.94 K/UL — HIGH (ref 0–0.9)
MONOCYTES NFR BLD AUTO: 11.3 % — SIGNIFICANT CHANGE UP (ref 2–14)
NEUTROPHILS # BLD AUTO: 5.73 K/UL — SIGNIFICANT CHANGE UP (ref 1.8–7.4)
NEUTROPHILS NFR BLD AUTO: 69.1 % — SIGNIFICANT CHANGE UP (ref 43–77)
NRBC # BLD: 0 /100 WBCS — SIGNIFICANT CHANGE UP (ref 0–0)
NT-PROBNP SERPL-SCNC: HIGH PG/ML (ref 0–300)
PHOSPHATE SERPL-MCNC: 1.6 MG/DL — LOW (ref 2.5–4.5)
PLATELET # BLD AUTO: 250 K/UL — SIGNIFICANT CHANGE UP (ref 150–400)
POTASSIUM SERPL-MCNC: 3.3 MMOL/L — LOW (ref 3.5–5.3)
POTASSIUM SERPL-SCNC: 3.3 MMOL/L — LOW (ref 3.5–5.3)
PROT SERPL-MCNC: 6.2 G/DL — SIGNIFICANT CHANGE UP (ref 6–8.3)
RBC # BLD: 3.69 M/UL — LOW (ref 4.2–5.8)
RBC # FLD: 18.6 % — HIGH (ref 10.3–14.5)
SARS-COV-2 IGG SERPL IA-ACNC: 2.1 RATIO — HIGH
SARS-COV-2 IGG SERPL QL IA: POSITIVE
SARS-COV-2 IGG SERPL QL IA: POSITIVE
SARS-COV-2 IGM SERPL IA-ACNC: 1.5 RATIO — HIGH
SODIUM SERPL-SCNC: 143 MMOL/L — SIGNIFICANT CHANGE UP (ref 135–145)
TROPONIN I SERPL-MCNC: 0.02 NG/ML — SIGNIFICANT CHANGE UP (ref 0.02–0.06)
WBC # BLD: 8.29 K/UL — SIGNIFICANT CHANGE UP (ref 3.8–10.5)
WBC # FLD AUTO: 8.29 K/UL — SIGNIFICANT CHANGE UP (ref 3.8–10.5)

## 2020-06-16 PROCEDURE — 99232 SBSQ HOSP IP/OBS MODERATE 35: CPT

## 2020-06-16 PROCEDURE — 71045 X-RAY EXAM CHEST 1 VIEW: CPT | Mod: 26

## 2020-06-16 RX ORDER — MAGNESIUM SULFATE 500 MG/ML
1 VIAL (ML) INJECTION ONCE
Refills: 0 | Status: COMPLETED | OUTPATIENT
Start: 2020-06-16 | End: 2020-06-16

## 2020-06-16 RX ORDER — POTASSIUM CHLORIDE 20 MEQ
40 PACKET (EA) ORAL ONCE
Refills: 0 | Status: COMPLETED | OUTPATIENT
Start: 2020-06-16 | End: 2020-06-16

## 2020-06-16 RX ADMIN — CHLORHEXIDINE GLUCONATE 1 APPLICATION(S): 213 SOLUTION TOPICAL at 05:10

## 2020-06-16 RX ADMIN — Medication 100 GRAM(S): at 09:35

## 2020-06-16 RX ADMIN — MIDODRINE HYDROCHLORIDE 5 MILLIGRAM(S): 2.5 TABLET ORAL at 18:00

## 2020-06-16 RX ADMIN — APIXABAN 5 MILLIGRAM(S): 2.5 TABLET, FILM COATED ORAL at 18:00

## 2020-06-16 RX ADMIN — ALBUTEROL 2 PUFF(S): 90 AEROSOL, METERED ORAL at 03:43

## 2020-06-16 RX ADMIN — Medication 2 PUFF(S): at 10:35

## 2020-06-16 RX ADMIN — Medication 2 PUFF(S): at 03:43

## 2020-06-16 RX ADMIN — ALBUTEROL 2 PUFF(S): 90 AEROSOL, METERED ORAL at 10:35

## 2020-06-16 RX ADMIN — ALBUTEROL 2 PUFF(S): 90 AEROSOL, METERED ORAL at 22:22

## 2020-06-16 RX ADMIN — Medication 2 PUFF(S): at 22:22

## 2020-06-16 RX ADMIN — Medication 40 MILLIEQUIVALENT(S): at 10:07

## 2020-06-16 RX ADMIN — Medication 2 PUFF(S): at 17:40

## 2020-06-16 RX ADMIN — Medication 0: at 05:37

## 2020-06-16 RX ADMIN — APIXABAN 5 MILLIGRAM(S): 2.5 TABLET, FILM COATED ORAL at 05:09

## 2020-06-16 RX ADMIN — Medication 40 MILLIGRAM(S): at 05:10

## 2020-06-16 RX ADMIN — FAMOTIDINE 20 MILLIGRAM(S): 10 INJECTION INTRAVENOUS at 11:09

## 2020-06-16 RX ADMIN — Medication 25 MICROGRAM(S): at 05:09

## 2020-06-16 RX ADMIN — MIDODRINE HYDROCHLORIDE 5 MILLIGRAM(S): 2.5 TABLET ORAL at 11:09

## 2020-06-16 RX ADMIN — ALBUTEROL 2 PUFF(S): 90 AEROSOL, METERED ORAL at 17:40

## 2020-06-16 RX ADMIN — MIDODRINE HYDROCHLORIDE 5 MILLIGRAM(S): 2.5 TABLET ORAL at 05:10

## 2020-06-16 NOTE — PROGRESS NOTE ADULT - SUBJECTIVE AND OBJECTIVE BOX
Follow-up Critical Care Progress Note  Chief Complaint : COVID-19        No new events overnight.  Denies SOB/CP.       Allergies :No Known Allergies      PAST MEDICAL & SURGICAL HISTORY:  GERD (gastroesophageal reflux disease)  Chronic kidney disease (CKD): Baseline creatinine 1.6  Type 2 diabetes mellitus  Heart failure, systolic and diastolic  Sciatica  Atrial fibrillation: Status post cardioversion  HTN (hypertension)  Neuropathy  Hypothyroid  COVID-19  Acute kidney failure  Artificial pacemaker      Medications:  MEDICATIONS  (STANDING):  ALBUTerol    90 MICROgram(s) HFA Inhaler 2 Puff(s) Inhalation every 6 hours  apixaban 5 milliGRAM(s) Oral every 12 hours  chlorhexidine 4% Liquid 1 Application(s) Topical <User Schedule>  dextrose 5%. 1000 milliLiter(s) (50 mL/Hr) IV Continuous <Continuous>  dextrose 50% Injectable 12.5 Gram(s) IV Push once  dextrose 50% Injectable 25 Gram(s) IV Push once  dextrose 50% Injectable 25 Gram(s) IV Push once  famotidine    Tablet 20 milliGRAM(s) Oral daily  furosemide   Injectable 40 milliGRAM(s) IV Push daily  insulin lispro (HumaLOG) corrective regimen sliding scale   SubCutaneous every 6 hours  ipratropium 17 MICROgram(s) HFA Inhaler 2 Puff(s) Inhalation every 6 hours  levothyroxine 25 MICROGram(s) Oral daily  midodrine. 5 milliGRAM(s) Oral three times a day    MEDICATIONS  (PRN):  acetaminophen    Suspension .. 650 milliGRAM(s) Oral every 6 hours PRN Temp greater or equal to 38C (100.4F), Mild Pain (1 - 3)      LABS:                        10.6   8.29  )-----------( 250      ( 16 Jun 2020 05:08 )             36.0     06-16    143  |  103  |  24<H>  ----------------------------<  88  3.3<L>   |  38<H>  |  1.19    Ca    10.0      16 Jun 2020 05:08  Phos  1.6     06-16  Mg     1.8     06-16    TPro  6.2  /  Alb  2.2<L>  /  TBili  0.8  /  DBili  x   /  AST  15  /  ALT  <6<L>  /  AlkPhos  61  06-16    COVID  06-12-20 @ 08:12  COVID -   Detected<!!>      COVID Biomarkers    06-15-20 @ 05:30 ESR --  ---  CRP --  ---  DDimer  339<H>   ---   LDH --   ---   Ferritin 341    06-12-20 @ 08:00 ESR --  ---  CRP 2.99<H>  ---  DDimer  464<H>   ---   LDH --   ---   Ferritin 394          WBC Trend  06-16-20 @ 05:08   -  8.29  06-15-20 @ 05:30   -  9.90  06-14-20 @ 05:00   -  11.48<H>    H/H Trend  06-16-20 @ 05:08   -  10.6<L> / 36.0<L>  06-15-20 @ 05:30   -  10.2<L> / 34.8<L>  06-14-20 @ 05:00   -  10.0<L> / 33.8<L>    Platelet Trend  06-16-20 @ 05:08   -  250  06-15-20 @ 05:30   -  230  06-14-20 @ 05:00   -  251    Trend Sodium  06-16-20 @ 05:08   -  143  06-15-20 @ 05:30   -  145  06-14-20 @ 05:00   -  147<H>  06-13-20 @ 15:14   -  146<H>    Trend Potassium  06-16-20 @ 05:08   -  3.3<L>  06-15-20 @ 05:30   -  3.7  06-14-20 @ 05:00   -  4.0  06-13-20 @ 15:14   -  4.6    Trend Bun/Cr  06-16-20 @ 05:08  BUN/CR -  24<H> / 1.19  06-15-20 @ 05:30  BUN/CR -  28<H> / 1.23  06-14-20 @ 05:00  BUN/CR -  32<H> / 1.66<H>  06-13-20 @ 15:14  BUN/CR -  32<H> / 1.92<H>      CULTURES: (if applicable)    Culture - Urine (collected 06-12-20 @ 08:50)  Source: .Urine Clean Catch (Midstream)  Final Report (06-13-20 @ 10:25):    No growth    Culture - Blood (collected 06-12-20 @ 08:00)  Source: .Blood Blood-Peripheral  Preliminary Report (06-13-20 @ 12:01):    No growth to date.    Culture - Blood (collected 06-12-20 @ 08:00)  Source: .Blood Blood-Peripheral  Preliminary Report (06-13-20 @ 12:01):    No growth to date.    ABG Trend  06-14-20 @ 08:10   - 7.41/53<H>/117<H>/99<H>  06-13-20 @ 12:05   - 7.35/59<H>/107/98<H>  06-12-20 @ 11:55   - 7.33<L>/51<H>/146<H>/>99<H>  06-12-20 @ 10:02   - 7.33<L>/46/407<H>/>99<H>  06-12-20 @ 08:35   - 7.00<LL>/>106/249<H>/99<H>          CAPILLARY BLOOD GLUCOSE      POCT Blood Glucose.: 92 mg/dL (16 Jun 2020 11:19)      RADIOLOGY  CXR:  < from: Xray Chest 1 View- PORTABLE-Routine (06.16.20 @ 06:24) >  IMPRESSION:    Status post removal of ET tube and enteric tube.    Mild pulmonary vascular congestion. Cardiomegaly. Pacemaker/AICD.    < end of copied text >    VITALS:  T(C): 36.9 (06-16-20 @ 08:00), Max: 37.6 (06-15-20 @ 21:00)  T(F): 98.5 (06-16-20 @ 08:00), Max: 99.6 (06-15-20 @ 21:00)  HR: 82 (06-16-20 @ 11:00) (79 - 86)  BP: 102/61 (06-16-20 @ 11:00) (99/65 - 127/67)  BP(mean): 72 (06-16-20 @ 11:00) (72 - 112)  ABP: --  ABP(mean): --  RR: 25 (06-16-20 @ 11:00) (13 - 25)  SpO2: 90% (06-16-20 @ 11:00) (90% - 100%)  CVP(mm Hg): --  CVP(cm H2O): --    Ins and Outs     06-15-20 @ 07:01  -  06-16-20 @ 07:00  --------------------------------------------------------  IN: 1175 mL / OUT: 2325 mL / NET: -1150 mL                I&O's Detail    15 Osmar 2020 07:01  -  16 Jun 2020 07:00  --------------------------------------------------------  IN:    dextrose 5% + sodium chloride 0.45%.: 1175 mL  Total IN: 1175 mL    OUT:    Indwelling Catheter - Urethral: 2325 mL  Total OUT: 2325 mL    Total NET: -1150 mL

## 2020-06-16 NOTE — PROGRESS NOTE ADULT - SUBJECTIVE AND OBJECTIVE BOX
In ICU, no distress    Vital Signs Last 24 Hrs  T(C): 36.9 (06-16-20 @ 08:00), Max: 37.6 (06-15-20 @ 21:00)  T(F): 98.5 (06-16-20 @ 08:00), Max: 99.6 (06-15-20 @ 21:00)  HR: 82 (06-16-20 @ 11:00) (79 - 86)  BP: 102/61 (06-16-20 @ 11:00) (99/65 - 127/67)  BP(mean): 72 (06-16-20 @ 11:00) (72 - 112)  RR: 25 (06-16-20 @ 11:00) (13 - 25)  SpO2: 90% (06-16-20 @ 11:00) (90% - 100%)    I&O's Detail    15 Osmar 2020 07:01  -  16 Jun 2020 07:00  --------------------------------------------------------  IN:    dextrose 5% + sodium chloride 0.45%.: 1175 mL  Total IN: 1175 mL    OUT:    Indwelling Catheter - Urethral: 2325 mL  Total OUT: 2325 mL    Total NET: -1150 mL    s1s2  coarse BS  soft  sm edema                                 10.6   8.29  )-----------( 250      ( 16 Jun 2020 05:08 )             36.0     16 Jun 2020 05:08    143    |  103    |  24     ----------------------------<  88     3.3     |  38     |  1.19     Ca    10.0       16 Jun 2020 05:08  Phos  1.6       16 Jun 2020 05:08  Mg     1.8       16 Jun 2020 05:08    TPro  6.2    /  Alb  2.2    /  TBili  0.8    /  DBili  x      /  AST  15     /  ALT  <6     /  AlkPhos  61     16 Jun 2020 05:08    LIVER FUNCTIONS - ( 16 Jun 2020 05:08 )  Alb: 2.2 g/dL / Pro: 6.2 g/dL / ALK PHOS: 61 U/L / ALT: <6 U/L / AST: 15 U/L / GGT: x           CARDIAC MARKERS ( 16 Jun 2020 05:08 )  .023 ng/mL / x     / x     / x     / x      CARDIAC MARKERS ( 15 Osmar 2020 05:30 )  .033 ng/mL / x     / 37 U/L / x     / x        acetaminophen    Suspension .. 650 milliGRAM(s) Oral every 6 hours PRN  ALBUTerol    90 MICROgram(s) HFA Inhaler 2 Puff(s) Inhalation every 6 hours  apixaban 5 milliGRAM(s) Oral every 12 hours  chlorhexidine 4% Liquid 1 Application(s) Topical <User Schedule>  dextrose 5%. 1000 milliLiter(s) IV Continuous <Continuous>  dextrose 50% Injectable 12.5 Gram(s) IV Push once  dextrose 50% Injectable 25 Gram(s) IV Push once  dextrose 50% Injectable 25 Gram(s) IV Push once  famotidine    Tablet 20 milliGRAM(s) Oral daily  furosemide   Injectable 40 milliGRAM(s) IV Push daily  insulin lispro (HumaLOG) corrective regimen sliding scale   SubCutaneous every 6 hours  ipratropium 17 MICROgram(s) HFA Inhaler 2 Puff(s) Inhalation every 6 hours  levothyroxine 25 MICROGram(s) Oral daily  midodrine. 5 milliGRAM(s) Oral three times a day    A/P:    Severe CM, EF 25-30%, mod TR/AS  Adm w/resp failure, CHF/COPD exacerbation, COVID +  REY has improved  Good UO  Neely, I/Os  Avoid nephrotoxins  Non-PTH hypercalcemia, etiology not clear  Improving  Will f/u PTHrP, SPEP  Continue Lasix  Avoid hypotension  Suppl K, Phos    [phone]

## 2020-06-16 NOTE — PROGRESS NOTE ADULT - SUBJECTIVE AND OBJECTIVE BOX
Follow up for as  SUBJ:    comfortable no current cardiac complaitns    PMH  GERD (gastroesophageal reflux disease)  Chronic kidney disease (CKD)  Type 2 diabetes mellitus  Heart failure, systolic and diastolic  Sciatica  Heart failure  Atrial fibrillation  HTN (hypertension)  Neuropathy  Diabetes  Hypokalemia  HLD (hyperlipidemia)  Hypothyroid  Bacteremia  Acute respiratory failure  Dehydration  COVID-19  Acute kidney failure      MEDICATIONS  (STANDING):  ALBUTerol    90 MICROgram(s) HFA Inhaler 2 Puff(s) Inhalation every 6 hours  apixaban 5 milliGRAM(s) Oral every 12 hours  chlorhexidine 4% Liquid 1 Application(s) Topical <User Schedule>  dextrose 5% + sodium chloride 0.45%. 1000 milliLiter(s) (50 mL/Hr) IV Continuous <Continuous>  dextrose 5%. 1000 milliLiter(s) (50 mL/Hr) IV Continuous <Continuous>  dextrose 50% Injectable 12.5 Gram(s) IV Push once  dextrose 50% Injectable 25 Gram(s) IV Push once  dextrose 50% Injectable 25 Gram(s) IV Push once  famotidine    Tablet 20 milliGRAM(s) Oral daily  furosemide   Injectable 40 milliGRAM(s) IV Push daily  insulin lispro (HumaLOG) corrective regimen sliding scale   SubCutaneous every 6 hours  ipratropium 17 MICROgram(s) HFA Inhaler 2 Puff(s) Inhalation every 6 hours  levothyroxine 25 MICROGram(s) Oral daily  midodrine. 5 milliGRAM(s) Oral three times a day    MEDICATIONS  (PRN):  acetaminophen    Suspension .. 650 milliGRAM(s) Oral every 6 hours PRN Temp greater or equal to 38C (100.4F), Mild Pain (1 - 3)        PHYSICAL EXAM:  Vital Signs Last 24 Hrs  T(C): 36.9 (16 Jun 2020 08:00), Max: 37.6 (15 Osmar 2020 21:00)  T(F): 98.5 (16 Jun 2020 08:00), Max: 99.6 (15 Osmar 2020 21:00)  HR: 81 (16 Jun 2020 10:00) (79 - 86)  BP: 127/67 (16 Jun 2020 10:00) (79/40 - 127/67)  BP(mean): 86 (16 Jun 2020 10:00) (54 - 112)  RR: 20 (16 Jun 2020 10:00) (13 - 23)  SpO2: 97% (16 Jun 2020 10:00) (97% - 100%)    GENERAL: NAD, elderly gentleman chronically ill appearing  HEAD:  Atraumatic, Normocephalic  EYES: EOMI, PERRLA, conjunctiva and sclera clear  ENT: Moist mucous membranes,  NECK: Supple, No JVD, no bruits  CHEST/LUNG: Clear to percussion bilaterally; No rales, rhonchi, wheezing, or rubs  HEART: Regular rate and rhythm; No murmurs, rubs, or gallops PMI non displaced.  ABDOMEN: Soft, Nontender, Nondistended; Bowel sounds present  EXTREMITIES:  2+ Peripheral Pulses, No clubbing, cyanosis, or edema  SKIN: No rashes or lesions  NERVOUS SYSTEM:  Cranial Nerves II-XII intact      TELEMETRY:    ECG:  < from: 12 Lead ECG (06.15.20 @ 12:51) >  Diagnosis Line Wide QRS rhythm with occasional premature ventricular complexes  ventricular pacer  atrial fibrillation underlying likely  Counterclockwise rotation  Anterolateral infarct , age undetermined  Abnormal ECG  When compared with ECG of 12-JUN-2020 07:42,  rate is increased by 4 beats per minute    Confirmed by JOMAR ANDERSON MD ([id number]) on 6/16/2020 9:32:00 AM    < end of copied text >      ECHO:    < from: TTE Echo Complete w/o Contrast w/ Doppler (06.12.20 @ 13:01) >  Summary:   1. Left ventricular ejection fraction, by visual estimation, is 25 to 30%.   2. Severely decreased global left ventricular systolic function.   3. Mildly increased LV wall thickness.   4. Spectral Doppler shows restrictive pattern of left ventricular myocardial filling (Grade III diastolic dysfunction).   5. There is no evidence of pericardial effusion.   6. Thickening and calcification of the anterior and posterior mitral valve leaflets.   7. Moderate tricuspid regurgitation.   8. Mild aortic regurgitation.   9. Moderate aortic valve stenosis.  10. Structurally normalpulmonic valve, with normal leaflet excursion.  11. Dilatation of the ascending aorta.  12. Estimated pulmonary artery systolic pressure is 63.4 mmHg assuming a right atrial pressure of 10 mmHg, which is consistent with moderate pulmonary hypertension.  13. Peak transaortic gradient equals 11.2 mmHg, mean transaortic gradient equals 6.0 mmHg, the calculated aortic valve area equals 1.37 cm² by the continuity equation consistent with moderate aortic stenosis.    [id number] Bradley Gonzalez MD,FACC , Electronically signed on 6/12/2020 at 2:00:14 PM       *** Final ***      BRADLEY GONZALEZ M.D., ATTENDING CARDIOLOGIST  This document has been electronically signed. Jun 12 2020  1:01PM    < end of copied text >      LABS:                        10.6   8.29  )-----------( 250      ( 16 Jun 2020 05:08 )             36.0     06-16    143  |  103  |  24<H>  ----------------------------<  88  3.3<L>   |  38<H>  |  1.19    Ca    10.0      16 Jun 2020 05:08  Phos  1.6     06-16  Mg     1.8     06-16    TPro  6.2  /  Alb  2.2<L>  /  TBili  0.8  /  DBili  x   /  AST  15  /  ALT  <6<L>  /  AlkPhos  61  06-16    CARDIAC MARKERS ( 16 Jun 2020 05:08 )  .023 ng/mL / x     / x     / x     / x      CARDIAC MARKERS ( 15 Osmar 2020 05:30 )  .033 ng/mL / x     / 37 U/L / x     / x        I&O's Summary    15 Osmar 2020 07:01  -  16 Jun 2020 07:00  --------------------------------------------------------  IN: 1175 mL / OUT: 2325 mL / NET: -1150 mL      BNPSerum Pro-Brain Natriuretic Peptide: 27153 pg/mL (06-16 @ 05:08)      RADIOLOGY & ADDITIONAL STUDIES:    < from: Xray Chest 1 View- PORTABLE-Urgent (06.12.20 @ 10:03) >  Impression: Endotracheal tube nasogastric tube are in satisfactory position. Left AICD reidentified in situ. No pneumothorax or other gross change heart and lungs.        DAVID KEATING M.D., ATTENDING RADIOLOGIST  This document has been electronically signed. Jun 12 2020 10:06AM    < end of copied text >

## 2020-06-16 NOTE — CHART NOTE - NSCHARTNOTEFT_GEN_A_CORE
Nutrition Follow Up Note  Hospital Course (Per Electronic Medical Record):   Source: Medical Record [X]  Nursing Staff [X]     Diet: Pureed with nectar liquids     Patient noted with fair po as per RN ,consuming ~50% of meals.     Current Weight: (6/160 183.8/83.4kg , unable to comment of weight change due to ? 21 lb weight loss x 3 day.     Pertinent Medications: MEDICATIONS  (STANDING):  ALBUTerol    90 MICROgram(s) HFA Inhaler 2 Puff(s) Inhalation every 6 hours  apixaban 5 milliGRAM(s) Oral every 12 hours  chlorhexidine 4% Liquid 1 Application(s) Topical <User Schedule>  dextrose 5%. 1000 milliLiter(s) (50 mL/Hr) IV Continuous <Continuous>  dextrose 50% Injectable 12.5 Gram(s) IV Push once  dextrose 50% Injectable 25 Gram(s) IV Push once  dextrose 50% Injectable 25 Gram(s) IV Push once  famotidine    Tablet 20 milliGRAM(s) Oral daily  furosemide   Injectable 40 milliGRAM(s) IV Push daily  insulin lispro (HumaLOG) corrective regimen sliding scale   SubCutaneous every 6 hours  ipratropium 17 MICROgram(s) HFA Inhaler 2 Puff(s) Inhalation every 6 hours  levothyroxine 25 MICROGram(s) Oral daily  midodrine. 5 milliGRAM(s) Oral three times a day    MEDICATIONS  (PRN):  acetaminophen    Suspension .. 650 milliGRAM(s) Oral every 6 hours PRN Temp greater or equal to 38C (100.4F), Mild Pain (1 - 3)      Pertinent Labs:  06-16 Na143 mmol/L Glu 88 mg/dL K+ 3.3 mmol/L<L> Cr  1.19 mg/dL BUN 24 mg/dL<H> 06-16 Phos 1.6 mg/dL<L> 06-16 Alb 2.2 g/dL<L>        Skin:  Stage 2 sacrum, unstageable coccyx    Edema: (+1) generalized edema    Last BM: on (6/15)     Estimated Needs:   [X] No Change since Previous Assessment      Previous Nutrition Diagnosis: Increased nutrition needs     Nutrition Diagnosis is [X] Ongoing,       New Nutrition Diagnosis:   [X] Inadequate Oral Intake related to respiratory status as evidence by 50% of meal consumed ,     Interventions:   1. Recommend Glucerna shake TID added (660cal/30g protein)  2. Suggest MVI , Vit C due to poor  skin integrity  noted pressure wounds .     Monitoring & Evaluation: will monitor:  [X] Weights   [X] PO Intake   [X] Follow Up (Per Protocol)  [X] Tolerance to Diet Prescription       RD to follow as per Nutrition protocol  Maribel Savage RDN Nutrition Follow Up Note  Hospital Course (Per Electronic Medical Record):   Source: Medical Record [X]  Nursing Staff [X]     Diet:   DASH/TLC pureed with nectar liquids.    Patient noted with fair po 50% , could benefit from po supplement added. Hx of DM noted however will not suggest consistent carbohydrate restriction due to noted POCT (92,91,101,87)      Pertinent Labs:  06-16 Na143 mmol/L Glu 88 mg/dL K+ 3.3 mmol/L<L> Cr  1.19 mg/dL BUN 24 mg/dL<H> 06-16 Phos 1.6 mg/dL<L> 06-16 Alb 2.2 g/dL<L>  POCT 92,91,101,87      Skin:  Stage 2 sacrum, unstageable coccyx    Edema: (+1) generalized edema    Last BM: on (6/15)     Estimated Needs:   [X] No Change since Previous Assessment      Previous Nutrition Diagnosis: Increased nutrition needs     Nutrition Diagnosis is [X] Ongoing,       New Nutrition Diagnosis:   [X] Inadequate Oral Intake related to respiratory status as evidence by 50% of meal consumed ,     Interventions:   1. Recommend Glucerna shake TID added (660cal/30g protein)  2. Suggest MVI , Vit C due to poor  skin integrity  noted pressure wounds .     Monitoring & Evaluation: will monitor:  [X] Weights   [X] PO Intake   [X] Follow Up (Per Protocol)  [X] Tolerance to Diet Prescription       RD to follow as per Nutrition protocol  Maribel Savage RDN

## 2020-06-16 NOTE — PROGRESS NOTE ADULT - ASSESSMENT
Physical Exam  Gen:  Not in distress, awake and following commands  Lung:  Bi lateral air entry, Scattered rhonchi throughout, no wheezing   CV:  S1, S2. RRR  Abd:  Soft, NT/ND.  BS normoactive, no masses to palp  Extrem:  No C/C/E, DP/radial pulses +2  Neuro:  Alert and confused no gross motor/sensory deficits    Assessment:  1. Acute hypercapnic respiratory failure Resolved, Extubated 6/14, non smoker , 911 Exposure - on Registry   2. Acute on chronic Heart failure with reduced EF  3. REY on CKD stage 4 Improving  4. Hypercalcemia improving  5. Afib  6. DM type 2  7. Hypothyroidism - Improving  8. + COVID19 infection  9. Metabolic encephalopathy    Plan  - Overall in negative fluid balance and improving  - n/c o2, continue IVF with lasix for hypercalcemia, Renal f/u   - Echo noted with depressed EF  - Nephrology f/u  - Supplement phos  - calcium management as per renal   - work up for hypercalcemia sent thus far normal PTH and Vitamin D level  - Cont. Anticoagulation for afib with eliquis  - Fingerstick glucose monitoring with coverage for hyperglycemia  - diet as per S&S  - pl transfer to medical floor today , d/w dr. jean Hernandez [phone] - Updated 6/16    - palliative care f/u

## 2020-06-17 LAB
ALBUMIN SERPL ELPH-MCNC: 2.6 G/DL — LOW (ref 3.3–5)
ALP SERPL-CCNC: 75 U/L — SIGNIFICANT CHANGE UP (ref 40–120)
ALT FLD-CCNC: <6 U/L — LOW (ref 10–45)
ANION GAP SERPL CALC-SCNC: 6 MMOL/L — SIGNIFICANT CHANGE UP (ref 5–17)
ANION GAP SERPL CALC-SCNC: 6 MMOL/L — SIGNIFICANT CHANGE UP (ref 5–17)
ANION GAP SERPL CALC-SCNC: 7 MMOL/L — SIGNIFICANT CHANGE UP (ref 5–17)
AST SERPL-CCNC: 18 U/L — SIGNIFICANT CHANGE UP (ref 10–40)
BASOPHILS # BLD AUTO: 0.05 K/UL — SIGNIFICANT CHANGE UP (ref 0–0.2)
BASOPHILS NFR BLD AUTO: 0.5 % — SIGNIFICANT CHANGE UP (ref 0–2)
BILIRUB SERPL-MCNC: 1.5 MG/DL — HIGH (ref 0.2–1.2)
BUN SERPL-MCNC: 26 MG/DL — HIGH (ref 7–23)
BUN SERPL-MCNC: 27 MG/DL — HIGH (ref 7–23)
BUN SERPL-MCNC: 30 MG/DL — HIGH (ref 7–23)
CA-I BLD-SCNC: 1.26 MMOL/L — SIGNIFICANT CHANGE UP (ref 1.12–1.3)
CALCIUM SERPL-MCNC: 10.4 MG/DL — SIGNIFICANT CHANGE UP (ref 8.4–10.5)
CALCIUM SERPL-MCNC: 10.5 MG/DL — SIGNIFICANT CHANGE UP (ref 8.4–10.5)
CALCIUM SERPL-MCNC: 10.5 MG/DL — SIGNIFICANT CHANGE UP (ref 8.4–10.5)
CHLORIDE SERPL-SCNC: 100 MMOL/L — SIGNIFICANT CHANGE UP (ref 96–108)
CHLORIDE SERPL-SCNC: 100 MMOL/L — SIGNIFICANT CHANGE UP (ref 96–108)
CHLORIDE SERPL-SCNC: 101 MMOL/L — SIGNIFICANT CHANGE UP (ref 96–108)
CO2 SERPL-SCNC: 34 MMOL/L — HIGH (ref 22–31)
CO2 SERPL-SCNC: 34 MMOL/L — HIGH (ref 22–31)
CO2 SERPL-SCNC: 36 MMOL/L — HIGH (ref 22–31)
CREAT SERPL-MCNC: 1.42 MG/DL — HIGH (ref 0.5–1.3)
CREAT SERPL-MCNC: 1.44 MG/DL — HIGH (ref 0.5–1.3)
CREAT SERPL-MCNC: 1.63 MG/DL — HIGH (ref 0.5–1.3)
CULTURE RESULTS: SIGNIFICANT CHANGE UP
CULTURE RESULTS: SIGNIFICANT CHANGE UP
EOSINOPHIL # BLD AUTO: 0.15 K/UL — SIGNIFICANT CHANGE UP (ref 0–0.5)
EOSINOPHIL NFR BLD AUTO: 1.6 % — SIGNIFICANT CHANGE UP (ref 0–6)
GLUCOSE BLDC GLUCOMTR-MCNC: 102 MG/DL — HIGH (ref 70–99)
GLUCOSE BLDC GLUCOMTR-MCNC: 139 MG/DL — HIGH (ref 70–99)
GLUCOSE BLDC GLUCOMTR-MCNC: 70 MG/DL — SIGNIFICANT CHANGE UP (ref 70–99)
GLUCOSE BLDC GLUCOMTR-MCNC: 72 MG/DL — SIGNIFICANT CHANGE UP (ref 70–99)
GLUCOSE BLDC GLUCOMTR-MCNC: 93 MG/DL — SIGNIFICANT CHANGE UP (ref 70–99)
GLUCOSE SERPL-MCNC: 109 MG/DL — HIGH (ref 70–99)
GLUCOSE SERPL-MCNC: 79 MG/DL — SIGNIFICANT CHANGE UP (ref 70–99)
GLUCOSE SERPL-MCNC: 79 MG/DL — SIGNIFICANT CHANGE UP (ref 70–99)
HCT VFR BLD CALC: 38.5 % — LOW (ref 39–50)
HGB BLD-MCNC: 11.4 G/DL — LOW (ref 13–17)
IMM GRANULOCYTES NFR BLD AUTO: 0.5 % — SIGNIFICANT CHANGE UP (ref 0–1.5)
LYMPHOCYTES # BLD AUTO: 1.5 K/UL — SIGNIFICANT CHANGE UP (ref 1–3.3)
LYMPHOCYTES # BLD AUTO: 16 % — SIGNIFICANT CHANGE UP (ref 13–44)
MAGNESIUM SERPL-MCNC: 1.9 MG/DL — SIGNIFICANT CHANGE UP (ref 1.6–2.6)
MAGNESIUM SERPL-MCNC: 2.1 MG/DL — SIGNIFICANT CHANGE UP (ref 1.6–2.6)
MCHC RBC-ENTMCNC: 28.4 PG — SIGNIFICANT CHANGE UP (ref 27–34)
MCHC RBC-ENTMCNC: 29.6 GM/DL — LOW (ref 32–36)
MCV RBC AUTO: 96 FL — SIGNIFICANT CHANGE UP (ref 80–100)
MONOCYTES # BLD AUTO: 1.16 K/UL — HIGH (ref 0–0.9)
MONOCYTES NFR BLD AUTO: 12.4 % — SIGNIFICANT CHANGE UP (ref 2–14)
NEUTROPHILS # BLD AUTO: 6.44 K/UL — SIGNIFICANT CHANGE UP (ref 1.8–7.4)
NEUTROPHILS NFR BLD AUTO: 69 % — SIGNIFICANT CHANGE UP (ref 43–77)
NRBC # BLD: 0 /100 WBCS — SIGNIFICANT CHANGE UP (ref 0–0)
PHOSPHATE SERPL-MCNC: 1.5 MG/DL — LOW (ref 2.5–4.5)
PHOSPHATE SERPL-MCNC: 2.4 MG/DL — LOW (ref 2.5–4.5)
PLATELET # BLD AUTO: 290 K/UL — SIGNIFICANT CHANGE UP (ref 150–400)
POTASSIUM SERPL-MCNC: 3.6 MMOL/L — SIGNIFICANT CHANGE UP (ref 3.5–5.3)
POTASSIUM SERPL-MCNC: 3.6 MMOL/L — SIGNIFICANT CHANGE UP (ref 3.5–5.3)
POTASSIUM SERPL-MCNC: 3.9 MMOL/L — SIGNIFICANT CHANGE UP (ref 3.5–5.3)
POTASSIUM SERPL-SCNC: 3.6 MMOL/L — SIGNIFICANT CHANGE UP (ref 3.5–5.3)
POTASSIUM SERPL-SCNC: 3.6 MMOL/L — SIGNIFICANT CHANGE UP (ref 3.5–5.3)
POTASSIUM SERPL-SCNC: 3.9 MMOL/L — SIGNIFICANT CHANGE UP (ref 3.5–5.3)
PROT SERPL-MCNC: 7.1 G/DL — SIGNIFICANT CHANGE UP (ref 6–8.3)
RBC # BLD: 4.01 M/UL — LOW (ref 4.2–5.8)
RBC # FLD: 18.6 % — HIGH (ref 10.3–14.5)
SODIUM SERPL-SCNC: 141 MMOL/L — SIGNIFICANT CHANGE UP (ref 135–145)
SODIUM SERPL-SCNC: 141 MMOL/L — SIGNIFICANT CHANGE UP (ref 135–145)
SODIUM SERPL-SCNC: 142 MMOL/L — SIGNIFICANT CHANGE UP (ref 135–145)
SPECIMEN SOURCE: SIGNIFICANT CHANGE UP
SPECIMEN SOURCE: SIGNIFICANT CHANGE UP
WBC # BLD: 9.35 K/UL — SIGNIFICANT CHANGE UP (ref 3.8–10.5)
WBC # FLD AUTO: 9.35 K/UL — SIGNIFICANT CHANGE UP (ref 3.8–10.5)

## 2020-06-17 PROCEDURE — 99233 SBSQ HOSP IP/OBS HIGH 50: CPT

## 2020-06-17 PROCEDURE — 93010 ELECTROCARDIOGRAM REPORT: CPT

## 2020-06-17 RX ORDER — AMIODARONE HYDROCHLORIDE 400 MG/1
1 TABLET ORAL
Qty: 900 | Refills: 0 | Status: DISCONTINUED | OUTPATIENT
Start: 2020-06-17 | End: 2020-06-17

## 2020-06-17 RX ORDER — SODIUM,POTASSIUM PHOSPHATES 278-250MG
1 POWDER IN PACKET (EA) ORAL THREE TIMES A DAY
Refills: 0 | Status: DISCONTINUED | OUTPATIENT
Start: 2020-06-17 | End: 2020-06-17

## 2020-06-17 RX ORDER — POTASSIUM PHOSPHATE, MONOBASIC POTASSIUM PHOSPHATE, DIBASIC 236; 224 MG/ML; MG/ML
15 INJECTION, SOLUTION INTRAVENOUS ONCE
Refills: 0 | Status: COMPLETED | OUTPATIENT
Start: 2020-06-17 | End: 2020-06-17

## 2020-06-17 RX ORDER — TAMSULOSIN HYDROCHLORIDE 0.4 MG/1
0.4 CAPSULE ORAL AT BEDTIME
Refills: 0 | Status: DISCONTINUED | OUTPATIENT
Start: 2020-06-17 | End: 2020-06-26

## 2020-06-17 RX ORDER — MAGNESIUM SULFATE 500 MG/ML
1 VIAL (ML) INJECTION ONCE
Refills: 0 | Status: COMPLETED | OUTPATIENT
Start: 2020-06-17 | End: 2020-06-17

## 2020-06-17 RX ORDER — AMIODARONE HYDROCHLORIDE 400 MG/1
0.5 TABLET ORAL
Qty: 900 | Refills: 0 | Status: DISCONTINUED | OUTPATIENT
Start: 2020-06-17 | End: 2020-06-18

## 2020-06-17 RX ORDER — AMIODARONE HYDROCHLORIDE 400 MG/1
1 TABLET ORAL
Qty: 900 | Refills: 0 | Status: DISCONTINUED | OUTPATIENT
Start: 2020-06-17 | End: 2020-06-18

## 2020-06-17 RX ORDER — INSULIN LISPRO 100/ML
VIAL (ML) SUBCUTANEOUS EVERY 6 HOURS
Refills: 0 | Status: DISCONTINUED | OUTPATIENT
Start: 2020-06-17 | End: 2020-06-26

## 2020-06-17 RX ORDER — AMIODARONE HYDROCHLORIDE 400 MG/1
150 TABLET ORAL ONCE
Refills: 0 | Status: COMPLETED | OUTPATIENT
Start: 2020-06-17 | End: 2020-06-17

## 2020-06-17 RX ORDER — POTASSIUM CHLORIDE 20 MEQ
10 PACKET (EA) ORAL
Refills: 0 | Status: DISCONTINUED | OUTPATIENT
Start: 2020-06-17 | End: 2020-06-17

## 2020-06-17 RX ADMIN — APIXABAN 5 MILLIGRAM(S): 2.5 TABLET, FILM COATED ORAL at 06:37

## 2020-06-17 RX ADMIN — ALBUTEROL 2 PUFF(S): 90 AEROSOL, METERED ORAL at 22:17

## 2020-06-17 RX ADMIN — Medication 2 PUFF(S): at 09:52

## 2020-06-17 RX ADMIN — ALBUTEROL 2 PUFF(S): 90 AEROSOL, METERED ORAL at 04:07

## 2020-06-17 RX ADMIN — Medication 25 MICROGRAM(S): at 06:37

## 2020-06-17 RX ADMIN — POTASSIUM PHOSPHATE, MONOBASIC POTASSIUM PHOSPHATE, DIBASIC 62.5 MILLIMOLE(S): 236; 224 INJECTION, SOLUTION INTRAVENOUS at 17:10

## 2020-06-17 RX ADMIN — AMIODARONE HYDROCHLORIDE 33.3 MG/MIN: 400 TABLET ORAL at 16:15

## 2020-06-17 RX ADMIN — Medication 40 MILLIGRAM(S): at 06:37

## 2020-06-17 RX ADMIN — ALBUTEROL 2 PUFF(S): 90 AEROSOL, METERED ORAL at 09:50

## 2020-06-17 RX ADMIN — Medication 100 GRAM(S): at 15:45

## 2020-06-17 RX ADMIN — Medication 2 PUFF(S): at 22:18

## 2020-06-17 RX ADMIN — Medication 2 PUFF(S): at 04:07

## 2020-06-17 RX ADMIN — AMIODARONE HYDROCHLORIDE 618 MILLIGRAM(S): 400 TABLET ORAL at 15:42

## 2020-06-17 RX ADMIN — Medication 100 GRAM(S): at 15:42

## 2020-06-17 NOTE — PROGRESS NOTE ADULT - SUBJECTIVE AND OBJECTIVE BOX
Dr. Perez Hospitalist Progress Note  ALESHIA PEREZMALSULEIMAN [id number]    Patient is a 75y old  Male who presents with a chief complaint of Respiratory failure (17 Jun 2020 11:50)    Interval:   seen and examined at bedside.   chart reviewed  reported no complaints. poor historian  BS lower side      ROS:  CONSTITUTIONAL:  No distress.no fever/chills  HEENT:  Eyes:  No diplopia  CARDIOVASCULAR:  No pressure, squeezing, tightness, heaviness or aching about the chest; no palpitations.no leg swelling, no orthopnea or PND  RESPIRATORY:  no SOB. no wheezing. no cough or sputum.  No hemoptysis  GI: no nausea, no vomiting, no diarrhea, no constipation. No hematochezia or melena  EXT:No leg or calf pain  SKIN: no skin rash  CNS: No headaches. No weakness. no numbness. No depression or anxiety. No SI    T(C): 36.7 (06-17-20 @ 11:23), Max: 37.3 (06-16-20 @ 16:43)  HR: 81 (06-17-20 @ 11:23) (80 - 87)  BP: 130/70 (06-17-20 @ 11:23) (105/66 - 137/94)  RR: 16 (06-17-20 @ 11:23) (16 - 25)  SpO2: 100% (06-17-20 @ 11:23) (94% - 100%)    06-16-20 @ 07:01  -  06-17-20 @ 07:00  --------------------------------------------------------  IN: 340 mL / OUT: 300 mL / NET: 40 mL    CAPILLARY BLOOD GLUCOSE      POCT Blood Glucose.: 93 mg/dL (17 Jun 2020 11:45)  POCT Blood Glucose.: 70 mg/dL (17 Jun 2020 06:31)  POCT Blood Glucose.: 72 mg/dL (17 Jun 2020 00:00)  POCT Blood Glucose.: 74 mg/dL (16 Jun 2020 17:10)      Physical Exam:  GENERAL: Not in distress. Alert    HEENT:  Normocephalic and atraumatic. PEARLA,EOMI. MMM  NECK: Supple.  No JVD.    CARDIOVASCULAR: RRR S1, S2. No murmur/rubs/gallop  LUNGS: BLAE+, no rales, no wheezing, no rhonchi.    ABDOMEN: ND. Soft,  NT, no guarding / rebound / rigidity. BS normoactive. No CVA tenderness.    EXTREMITIES: no cyanosis, no clubbing, + edema. no le or calf TP  SKIN: no rash.   NEUROLOGIC: Alert and awake.strength is symmetric, sensation intact, speech fluent.    PSYCHIATRIC: Calm.  No agitation.    Labs                        11.4   9.35  )-----------( 290      ( 17 Jun 2020 07:15 )             38.5     06-17    141  |  100  |  26<H>  ----------------------------<  79  3.6   |  34<H>  |  1.44<H>    Ca    10.5      17 Jun 2020 07:15  Phos  1.5     06-17  Mg     1.9     06-17    TPro  7.1  /  Alb  2.6<L>  /  TBili  1.5<H>  /  DBili  x   /  AST  18  /  ALT  <6<L>  /  AlkPhos  75  06-17    A1C with Estimated Average Glucose (06.13.20 @ 06:00)    A1C with Estimated Average Glucose Result: 5.0: Method: Immunoassay       Reference Range                4.0-5.6%       High risk (prediabetic)        5.7-6.4%       Diabetic, diagnostic             >=6.5%       ADA diabetic treatment goal       <7.0%  The Hemoglobin A1c testing is NGSP-certified.Reference ranges are based  upon the 2010 recommendations of  the American Diabetes Association.  Interpretation may vary for children  and adolescents. %    Estimated Average Glucose: 97: The Estimated Average Glucose (eAG) or Mean Plasma Glucose (MPG) value is  calculated from the hemoglobin A1c value and covers the same time period.   The American Diabetes Association (ADA) and other professional  organizations recommend reporting the eAG with the HgbA1c. mg/dL    Thyroid Stimulating Hormone, Serum (06.12.20 @ 17:00)    Thyroid Stimulating Hormone, Serum: 2.92 uIU/mL         MEDICATIONS  (STANDING):  ALBUTerol    90 MICROgram(s) HFA Inhaler 2 Puff(s) Inhalation every 6 hours  apixaban 5 milliGRAM(s) Oral every 12 hours  chlorhexidine 4% Liquid 1 Application(s) Topical <User Schedule>  dextrose 5%. 1000 milliLiter(s) (50 mL/Hr) IV Continuous <Continuous>  dextrose 50% Injectable 12.5 Gram(s) IV Push once  dextrose 50% Injectable 25 Gram(s) IV Push once  dextrose 50% Injectable 25 Gram(s) IV Push once  insulin lispro (HumaLOG) corrective regimen sliding scale   SubCutaneous every 6 hours  ipratropium 17 MICROgram(s) HFA Inhaler 2 Puff(s) Inhalation every 6 hours  levothyroxine 25 MICROGram(s) Oral daily  midodrine. 5 milliGRAM(s) Oral three times a day  potassium phosphate / sodium phosphate Tablet (K-PHOS No. 2) 1 Tablet(s) Oral three times a day  tamsulosin 0.4 milliGRAM(s) Oral at bedtime    MEDICATIONS  (PRN):  acetaminophen    Suspension .. 650 milliGRAM(s) Oral every 6 hours PRN Temp greater or equal to 38C (100.4F), Mild Pain (1 - 3)

## 2020-06-17 NOTE — PROGRESS NOTE ADULT - ASSESSMENT
75M initially in ICU COVID-19 (+), hypercaneic resp. failure, intubated. Was extubated and transferred to medical floor. Now returns with episodes of torsades and AICD shocks    PLAN;  maintain amio drip x 24 hrs then convert to PO dosing  -will repeat labs now for goal K+ 4-4.5 and magnesium >2  -will need speech  swallow study as he was noted to have drooling with midodrine pills tonight  -glycemic control with lantus and ISS

## 2020-06-17 NOTE — PROGRESS NOTE ADULT - SUBJECTIVE AND OBJECTIVE BOX
Patient is a 75y old  Male who presents with a chief complaint of Respiratory failure (17 Jun 2020 21:15)      BRIEF HOSPITAL COURSE:     75M w/ PMH listed below, arrived from [facility] with AMS. In ED ABG revealed severe hypercapneic resp. failure (pH 7.00 w/ PCO2 >100), patient was inubated and place don mechincal ventialtion. Of note patient also tested COVID-19 (+), per report he has been (+) "since the beginning of the pandemic, per family"    Events last 24 hours:   -patient had been transferred out of ICU, however now returns with episodes of torsades de pointes and AICD shocks. Transferred to ICU for amio gtt.     PAST MEDICAL & SURGICAL HISTORY:  GERD (gastroesophageal reflux disease)  Chronic kidney disease (CKD): Baseline creatinine 1.6  Type 2 diabetes mellitus  Heart failure, systolic and diastolic  Sciatica  Atrial fibrillation: Status post cardioversion  HTN (hypertension)  Neuropathy  Hypothyroid  COVID-19  Acute kidney failure  Artificial pacemaker      Review of Systems:  CONSTITUTIONAL: No fever, chills, or fatigue  RESPIRATORY: No cough, wheezing, chills or hemoptysis; No shortness of breath  CARDIOVASCULAR: No chest pain, palpitations, dizziness, or leg swelling  GASTROINTESTINAL: No abdominal or epigastric pain. No nausea, vomiting, or hematemesis; No diarrhea or constipation. No melena or hematochezia.      Medications:    aMIOdarone Infusion 0.999 mG/Min IV Continuous <Continuous>  aMIOdarone Infusion 0.5 mG/Min IV Continuous <Continuous>  midodrine. 5 milliGRAM(s) Oral three times a day  tamsulosin 0.4 milliGRAM(s) Oral at bedtime    ALBUTerol    90 MICROgram(s) HFA Inhaler 2 Puff(s) Inhalation every 6 hours  ipratropium 17 MICROgram(s) HFA Inhaler 2 Puff(s) Inhalation every 6 hours    acetaminophen    Suspension .. 650 milliGRAM(s) Oral every 6 hours PRN      apixaban 5 milliGRAM(s) Oral every 12 hours        dextrose 50% Injectable 12.5 Gram(s) IV Push once  dextrose 50% Injectable 25 Gram(s) IV Push once  dextrose 50% Injectable 25 Gram(s) IV Push once  insulin lispro (HumaLOG) corrective regimen sliding scale   SubCutaneous every 6 hours  levothyroxine 25 MICROGram(s) Oral daily    dextrose 5%. 1000 milliLiter(s) IV Continuous <Continuous>      chlorhexidine 4% Liquid 1 Application(s) Topical <User Schedule>            ICU Vital Signs Last 24 Hrs  T(C): 37.6 (17 Jun 2020 19:00), Max: 38.6 (17 Jun 2020 15:30)  T(F): 99.6 (17 Jun 2020 19:00), Max: 101.5 (17 Jun 2020 15:30)  HR: 84 (17 Jun 2020 21:00) (77 - 85)  BP: 124/81 (17 Jun 2020 20:00) (118/79 - 137/94)  BP(mean): 93 (17 Jun 2020 20:00) (91 - 100)  RR: 24 (17 Jun 2020 21:00) (16 - 27)  SpO2: 92% (17 Jun 2020 21:00) (92% - 100%)          I&O's Detail    16 Jun 2020 07:01  -  17 Jun 2020 07:00  --------------------------------------------------------  IN:    dextrose 5% + sodium chloride 0.45%.: 220 mL    Oral Fluid: 120 mL  Total IN: 340 mL    OUT:    Indwelling Catheter - Urethral: 300 mL  Total OUT: 300 mL    Total NET: 40 mL      17 Jun 2020 07:01  -  17 Jun 2020 22:49  --------------------------------------------------------  IN:    Oral Fluid: 200 mL  Total IN: 200 mL    OUT:  Total OUT: 0 mL    Total NET: 200 mL            LABS:                        11.4   9.35  )-----------( 290      ( 17 Jun 2020 07:15 )             38.5     06-17    141  |  100  |  26<H>  ----------------------------<  79  3.6   |  34<H>  |  1.44<H>    Ca    10.5      17 Jun 2020 07:15  Phos  1.5     06-17  Mg     1.9     06-17    TPro  7.1  /  Alb  2.6<L>  /  TBili  1.5<H>  /  DBili  x   /  AST  18  /  ALT  <6<L>  /  AlkPhos  75  06-17      CARDIAC MARKERS ( 16 Jun 2020 05:08 )  .023 ng/mL / x     / x     / x     / x          CAPILLARY BLOOD GLUCOSE      POCT Blood Glucose.: 102 mg/dL (17 Jun 2020 22:28)        CULTURES:  Culture Results:   No growth (06-12-20 @ 08:50)  Culture Results:   No Growth Final (06-12-20 @ 08:00)  Culture Results:   No Growth Final (06-12-20 @ 08:00)      Physical Examination:    General: No acute distress.  Alert, oriented, interactive, nonfocal    HEENT: Pupils equal, reactive to light.  Symmetric.    PULM: Clear to auscultation bilaterally, no significant sputum production    CVS: Regular rate and rhythm, no murmurs, rubs, or gallops    ABD: Soft, nondistended, nontender, normoactive bowel sounds, no masses    EXT: No edema, nontender    SKIN: Warm and well perfused, no rashes noted.

## 2020-06-17 NOTE — PROGRESS NOTE ADULT - ASSESSMENT
75 year old man admitted June 12, 2020 for respiratory failure, s/p extubation... he is COVID +.  Earlier today he had firing of AICD.    Rhythm strips reviewed... torsades de pointe and then firing of defibrillator.  He has chronic CHF (acute and chronic systolic and diastolic CHF), s/p ICD, AF   Metabolic encephalopathy. CKD    Plan  - recheck electrolytes ... keep K>= 4.0 and Mg >= 2.0  - favor starting amiodarone drip, as he may be at risk for recurrent VF  - currently not on guide line directed medical therapy with long acting beta blocker, ACE-I/ARB or ARNI... possibly due to low baseline BP and/or renal insuffiencey  - cautiously continue a/c and monitor h/h   - overall prognosis is guarded    discussed with patient's son Jose  discussed with Medicine and ICU teams   also discussed his primary cardiologist Dr. Pelletier [phone] ... he does not feel he is a candidate for advanced therapies, and based on negative cardiac cath 2017 - not a candidate for repeat cath at this time

## 2020-06-17 NOTE — PROGRESS NOTE ADULT - ASSESSMENT
A/P 75 year old male with extensive PMH including CAD, systolic/diastolic heart failure status post AICD, atrial fibrillation status post cardioversion, COPD, type 2 DM, CKD, hypothyroidism, chronic hypotension on midodrin who presenting from [facility] with acute hypercarbic respiratory failure requiring intubation, extubated 6/14     Problem/Plan - 1:  ·  Problem: Acute hypercapnic,hypoxic respiratory failure.  Plan: resolved     Problem/Plan - 2:  ·  Problem: Acute on chronic Heart failure, systolic and diastolic.  Plan: off Lasix 6/17 due to REY [ dced by renal].  No ACE/ARB with renal dysfunction, cautious use of beta blocker as patient on midodrine on admission. cardio on board. resume ace arb arni once BP better. consider aldactone.  EF less than 35. I and Os. daily weight     Problem/Plan - 3:  ·  Problem: REY on Chronic kidney disease (CKD) 3.  Plan: Son states baseline creatinine 1.6, continue to monitor electrolytes and creatinine, Off Neely. creatinine backed up. bladder scan/straight cath. kidney/bladder USG. add flomax. I and O.      Problem/Plan - 4:  ·  Problem: Hypercalcemia.  Plan: Uncertain etiology, normal PTH and Vit D. renal f/u noted. Will f/u PTHrP, SPEP     Problem/Plan - 5:  ·  Problem: Atrial fibrillation.  Plan: continue AC with Eliquis. off BB due to hypotension. resume as able     Problem/Plan - 6:  Problem: Type 2 diabetes mellitus. Plan: FS with RISS, check Ha1c. 5.0     Problem/Plan - 7:  ·  Problem: Hypothyroid.  Plan: normal TSH, continue synthroid.      Problem/Plan - 8:  ·  Problem: Hypophosphatemia  Plan: normal TSH,PTH. replaced. montior     Problem/Plan - 9:  ·  Problem: dysphagia.  Plan: SLP edil noted. on dysphagia 1 pureed with nectar thick. aspiration precautions.     Problem/Plan - 10:  ·  Problem: severe protein calorie malnutrition.  Plan: needs reinforcement to feeding. nutrition cx noted. added glucerna     Problem/Plan - 8:  ·  Problem: Prophylactic measure.  Plan: On Eliquis, Protonix daily    Palliative :  f/u GOC, pt in bed this AM, was mostly lethargic, did not want to eat, had few one word answers, appeared weak.  I called for  follow  up conversation with son Jose, updated him on pt condition from my visit today. Son stated he has not heard from a Dr yet today . I assured him he would hear from medical  team. I also updated him. We again began discussion  of what the goc are for his dad, we reviewed his history and dx of heart failure. Son feels that his dad will maybe get strong enough to have this conversation for himself. Son says he spoke to his dad on Monday night and thought he sounded good.  Son needs time to think and discuss further with family.  Will cont to follow w/ med team , cont supportive care. A/P 75 year old male with extensive PMH including CAD, systolic/diastolic heart failure status post AICD, atrial fibrillation status post cardioversion, COPD, type 2 DM, CKD, hypothyroidism, chronic hypotension on midodrin who presenting from [facility] with acute hypercarbic respiratory failure requiring intubation, extubated 6/14     Problem/Plan - 1:  ·  Problem: Acute hypercapnic,hypoxic respiratory failure.  Plan: resolved     Problem/Plan - 2:  ·  Problem: Acute on chronic Heart failure, systolic and diastolic.  Plan: off Lasix 6/17 due to REY [ dced by renal].  No ACE/ARB with renal dysfunction, cautious use of beta blocker as patient on midodrine on admission. cardio on board. resume ace arb arni once BP better. consider aldactone.  EF less than 35. I and Os. daily weight     Problem/Plan - 3:  ·  Problem: REY on Chronic kidney disease (CKD) 3.  Plan: Son states baseline creatinine 1.6, continue to monitor electrolytes and creatinine, Off Neely. creatinine backed up. bladder scan/straight cath. kidney/bladder USG. add flomax. I and O.      Problem/Plan - 4:  ·  Problem: Hypercalcemia.  Plan: Uncertain etiology, normal PTH and Vit D. renal f/u noted. Will f/u PTHrP, SPEP     Problem/Plan - 5:  ·  Problem: Atrial fibrillation.  Plan: continue AC with Eliquis. off BB due to hypotension. resume as able     Problem/Plan - 6:  Problem: Type 2 diabetes mellitus. Plan: FS with RISS, check Ha1c. 5.0     Problem/Plan - 7:  ·  Problem: Hypothyroid.  Plan: normal TSH, continue synthroid.      Problem/Plan - 8:  ·  Problem: Hypophosphatemia  Plan: normal TSH,PTH. replaced. montior     Problem/Plan - 9:  ·  Problem: dysphagia.  Plan: SLP edil noted. on dysphagia 1 pureed with nectar thick. aspiration precautions.     Problem/Plan - 10:  ·  Problem: severe protein calorie malnutrition.  Plan: needs reinforcement to feeding. nutrition cx noted. added glucerna     Problem/Plan - 8:  ·  Problem: Prophylactic measure.  Plan: On Eliquis, Protonix daily    Palliative :  f/u GOC, pt in bed this AM, was mostly lethargic, did not want to eat, had few one word answers, appeared weak.  I called for  follow  up conversation with irma Garcia, updated him on pt condition from my visit today. Son stated he has not heard from a Dr yet today . I assured him he would hear from medical  team. I also updated him. We again began discussion  of what the goc are for his dad, we reviewed his history and dx of heart failure. Son feels that his dad will maybe get strong enough to have this conversation for himself. Son says he spoke to his dad on Monday night and thought he sounded good.  He does say he knows pt would not want a feeding tube , and does not think he would want intubation again , however, Son needs time to think and discuss further with family.  Will cont to follow w/ med team , cont supportive care.      ADDENDUM:  irma Garcia called me back, he received call from cardiology after we spoke, pts defibrillator was firing this afternoon, and pt is being transferred back to ccu . Son says he would like to see him and hopefully speak with him regarding further interventions /goc.  I  received  permission for irma Garcia to visit this evening, I discussed this with CCU team and also informed them son may be considering reviewing the molst form for goc.  Will cont to follow w/ ccu team.

## 2020-06-17 NOTE — PROGRESS NOTE ADULT - SUBJECTIVE AND OBJECTIVE BOX
Follow-up Critical Care Progress Note  Chief Complaint : COVID-19    pt seen and examined  comfortable  c/o mild sob  no wheezing cp, palp        Allergies :No Known Allergies      PAST MEDICAL & SURGICAL HISTORY:  GERD (gastroesophageal reflux disease)  Chronic kidney disease (CKD): Baseline creatinine 1.6  Type 2 diabetes mellitus  Heart failure, systolic and diastolic  Sciatica  Atrial fibrillation: Status post cardioversion  HTN (hypertension)  Neuropathy  Hypothyroid  COVID-19  Acute kidney failure  Artificial pacemaker      Medications:  MEDICATIONS  (STANDING):  ALBUTerol    90 MICROgram(s) HFA Inhaler 2 Puff(s) Inhalation every 6 hours  apixaban 5 milliGRAM(s) Oral every 12 hours  chlorhexidine 4% Liquid 1 Application(s) Topical <User Schedule>  dextrose 5%. 1000 milliLiter(s) (50 mL/Hr) IV Continuous <Continuous>  dextrose 50% Injectable 12.5 Gram(s) IV Push once  dextrose 50% Injectable 25 Gram(s) IV Push once  dextrose 50% Injectable 25 Gram(s) IV Push once  insulin lispro (HumaLOG) corrective regimen sliding scale   SubCutaneous every 6 hours  ipratropium 17 MICROgram(s) HFA Inhaler 2 Puff(s) Inhalation every 6 hours  levothyroxine 25 MICROGram(s) Oral daily  midodrine. 5 milliGRAM(s) Oral three times a day  potassium phosphate / sodium phosphate Powder (PHOS-NaK) 1 Packet(s) Oral three times a day  tamsulosin 0.4 milliGRAM(s) Oral at bedtime    MEDICATIONS  (PRN):  acetaminophen    Suspension .. 650 milliGRAM(s) Oral every 6 hours PRN Temp greater or equal to 38C (100.4F), Mild Pain (1 - 3)      LABS:                        11.4   9.35  )-----------( 290      ( 17 Jun 2020 07:15 )             38.5     06-17    141  |  100  |  26<H>  ----------------------------<  79  3.6   |  34<H>  |  1.44<H>    Ca    10.5      17 Jun 2020 07:15  Phos  1.5     06-17  Mg     1.9     06-17    TPro  7.1  /  Alb  2.6<L>  /  TBili  1.5<H>  /  DBili  x   /  AST  18  /  ALT  <6<L>  /  AlkPhos  75  06-17    HIT ab -- 06-15 @ 05:30  HIT ab EIA --  D Dimer -339  HIT ab -- 06-12 @ 08:00  HIT ab EIA --  D Dimer -464    CARDIAC MARKERS ( 16 Jun 2020 05:08 )  .023 ng/mL / x     / x     / x     / x        Serum Pro-Brain Natriuretic Peptide: 64912 pg/mL (06-16-20 @ 05:08)  Serum Pro-Brain Natriuretic Peptide: 94119 pg/mL (06-15-20 @ 05:30)        CULTURES: (if applicable)    Culture - Urine (collected 06-12-20 @ 08:50)  Source: .Urine Clean Catch (Midstream)  Final Report (06-13-20 @ 10:25):    No growth    Culture - Blood (collected 06-12-20 @ 08:00)  Source: .Blood Blood-Peripheral  Final Report (06-17-20 @ 12:00):    No Growth Final    Culture - Blood (collected 06-12-20 @ 08:00)  Source: .Blood Blood-Peripheral  Final Report (06-17-20 @ 12:00):    No Growth Final      RADIOLOGY  CXR:  < from: Xray Chest 1 View- PORTABLE-Routine (06.16.20 @ 06:24) >  IMPRESSION:    Status post removal of ET tube and enteric tube.    Mild pulmonary vascular congestion. Cardiomegaly. Pacemaker/AICD.    < end of copied text >    VITALS:  T(C): 36.7 (06-17-20 @ 11:23), Max: 37.3 (06-16-20 @ 16:43)  T(F): 98 (06-17-20 @ 11:23), Max: 99.2 (06-16-20 @ 16:43)  HR: 81 (06-17-20 @ 11:23) (80 - 87)  BP: 130/70 (06-17-20 @ 11:23) (105/66 - 137/94)  BP(mean): 75 (06-16-20 @ 14:00) (75 - 75)  ABP: --  ABP(mean): --  RR: 16 (06-17-20 @ 11:23) (16 - 25)  SpO2: 100% (06-17-20 @ 11:23) (94% - 100%)  CVP(mm Hg): --  CVP(cm H2O): --    Ins and Outs     06-16-20 @ 07:01  -  06-17-20 @ 07:00  --------------------------------------------------------  IN: 340 mL / OUT: 300 mL / NET: 40 mL        Height (cm): 170.2 (06-16-20 @ 16:43)  Weight (kg): 85.9 (06-16-20 @ 16:43)  BMI (kg/m2): 29.7 (06-16-20 @ 16:43)        I&O's Detail    16 Jun 2020 07:01  -  17 Jun 2020 07:00  --------------------------------------------------------  IN:    dextrose 5% + sodium chloride 0.45%.: 220 mL    Oral Fluid: 120 mL  Total IN: 340 mL    OUT:    Indwelling Catheter - Urethral: 300 mL  Total OUT: 300 mL    Total NET: 40 mL

## 2020-06-17 NOTE — PROGRESS NOTE ADULT - SUBJECTIVE AND OBJECTIVE BOX
SUBJ:  Patient is a 75y old  Male who presents with a chief complaint of Respiratory failure (17 Jun 2020 13:12)  asked to re evaluate patient due to defibrillator firing x 2  he is in bed, appears confused... but in no cardiopulmonary distress   case discussed with hospitalist, intensive care team and his son Jose       PAST MEDICAL & SURGICAL HISTORY  cardiac cath 2017 ... normal coronary arteries  GERD (gastroesophageal reflux disease)  Chronic kidney disease (CKD): Baseline creatinine 1.6  Type 2 diabetes mellitus  Heart failure, systolic and diastolic  Sciatica  Atrial fibrillation: Status post cardioversion  HTN (hypertension)  Neuropathy  Hypothyroid  COVID-19  Acute kidney failure  Artificial pacemaker    Home Medications:  calcitriol 0.5 mcg oral capsule: 1 cap(s) orally once a day (12 Jun 2020 10:25)  calcium acetate 667 mg oral tablet: 1 tab(s) orally 3 times a day (12 Jun 2020 10:25)  Eliquis 5 mg oral tablet: 1 tab(s) orally 2 times a day (12 Jun 2020 10:25)  ferrous sulfate 324 mg (65 mg elemental iron) oral tablet: orally once a day (12 Jun 2020 10:25)  gabapentin 100 mg oral capsule: 1 cap(s) orally 3 times a day (12 Jun 2020 10:25)  midodrine 10 mg oral tablet: orally 2 times a day (12 Jun 2020 10:25)  Nephro-John oral tablet: 1 tab(s) orally once a day (12 Jun 2020 10:25)  omeprazole 40 mg oral delayed release capsule: 1 cap(s) orally once a day (12 Jun 2020 10:25)  potassium chloride 10 mEq oral capsule, extended release: orally once a day (12 Jun 2020 10:25)  Senna 8.6 mg oral tablet: 1 tab(s) orally once a day (at bedtime) (12 Jun 2020 10:25)  Synthroid 25 mcg (0.025 mg) oral tablet: 1 tab(s) orally once a day (12 Jun 2020 10:25)  Uloric 40 mg oral tablet: 1 tab(s) orally once a day (12 Jun 2020 10:25)  Ventolin HFA 90 mcg/inh inhalation aerosol: 2 puff(s) inhaled every 6 hours (12 Jun 2020 10:25)    MEDICATIONS  (STANDING):  ALBUTerol    90 MICROgram(s) HFA Inhaler 2 Puff(s) Inhalation every 6 hours  apixaban 5 milliGRAM(s) Oral every 12 hours  chlorhexidine 4% Liquid 1 Application(s) Topical <User Schedule>  dextrose 5%. 1000 milliLiter(s) (50 mL/Hr) IV Continuous <Continuous>  dextrose 50% Injectable 12.5 Gram(s) IV Push once  dextrose 50% Injectable 25 Gram(s) IV Push once  dextrose 50% Injectable 25 Gram(s) IV Push once  insulin lispro (HumaLOG) corrective regimen sliding scale   SubCutaneous every 6 hours  ipratropium 17 MICROgram(s) HFA Inhaler 2 Puff(s) Inhalation every 6 hours  levothyroxine 25 MICROGram(s) Oral daily  midodrine. 5 milliGRAM(s) Oral three times a day  potassium phosphate / sodium phosphate Powder (PHOS-NaK) 1 Packet(s) Oral three times a day  tamsulosin 0.4 milliGRAM(s) Oral at bedtime    MEDICATIONS  (PRN):  acetaminophen    Suspension .. 650 milliGRAM(s) Oral every 6 hours PRN Temp greater or equal to 38C (100.4F), Mild Pain (1 - 3)          Vital Signs Last 24 Hrs  T(C): 37.6 (17 Jun 2020 14:19), Max: 37.6 (17 Jun 2020 14:19)  T(F): 99.6 (17 Jun 2020 14:19), Max: 99.6 (17 Jun 2020 14:19)  HR: 80 (17 Jun 2020 14:19) (80 - 85)  BP: 125/78 (17 Jun 2020 14:19) (122/86 - 137/94)  BP(mean): --  RR: 16 (17 Jun 2020 14:19) (16 - 22)  SpO2: 98% (17 Jun 2020 14:19) (96% - 100%)    REVIEW OF SYSTEMS: unable to obtain       PHYSICAL EXAM  Constitutional:  elderly man confused  HEENT: normocephalic, atraumatic.  PERRLA. EOMI  Neck : No JVD. no carotid bruits  Lungs:  decreased breath sounds at bases   Heart:  S1 and S2. No S3, S4. II/VI systolic murmur.  Abdomen:  soft, non tender.  Extremities: No clubbing, cyanoisis or edema  Nuerologic:  A+O x 3. No focal deficits  Skin:  no rashes        LABS:                        11.4   9.35  )-----------( 290      ( 17 Jun 2020 07:15 )             38.5     06-17    141  |  100  |  26<H>  ----------------------------<  79  3.6   |  34<H>  |  1.44<H>    Ca    10.5      17 Jun 2020 07:15  Phos  1.5     06-17  Mg     1.9     06-17    TPro  7.1  /  Alb  2.6<L>  /  TBili  1.5<H>  /  DBili  x   /  AST  18  /  ALT  <6<L>  /  AlkPhos  75  06-17    CARDIAC MARKERS ( 16 Jun 2020 05:08 )  .023 ng/mL / x     / x     / x     / x          CARDIAC MARKERS ( 16 Jun 2020 05:08 )  .023 ng/mL / x     / x     / x     / x        I&O's Summary    16 Jun 2020 07:01  -  17 Jun 2020 07:00  --------------------------------------------------------  IN: 340 mL / OUT: 300 mL / NET: 40 mL    17 Jun 2020 07:01  -  17 Jun 2020 14:53  --------------------------------------------------------  IN: 200 mL / OUT: 0 mL / NET: 200 mL    < from: TTE Echo Complete w/o Contrast w/ Doppler (06.12.20 @ 13:01) >   1. Left ventricular ejection fraction, by visual estimation, is 25 to 30%.   2. Severely decreased global left ventricular systolic function.   3. Mildly increased LV wall thickness.   4. Spectral Doppler shows restrictive pattern of left ventricular myocardial filling (Grade III diastolic dysfunction).   5. There is no evidence of pericardial effusion.   6. Thickening and calcification of the anterior and posterior mitral valve leaflets.   7. Moderate tricuspid regurgitation.   8. Mild aortic regurgitation.   9. Moderate aortic valve stenosis.  10. Structurally normal pulmonic valve, with normal leaflet excursion.  11. Dilatation of the ascending aorta.  12. Estimated pulmonary artery systolic pressure is 63.4 mmHg assuming a right atrial pressure of 10 mmHg, which is consistent with moderate pulmonary hypertension.  13. Peak transaortic gradient equals 11.2 mmHg, mean transaortic gradient equals 6.0 mmHg, the calculated aortic valve area equals 1.37 cm² by the continuity equation consistent with moderate aortic stenosis.    < end of copied text >      tele rhythm strips reviewed.... paced rhythm and then torsades and firing of ICD

## 2020-06-17 NOTE — GOALS OF CARE CONVERSATION - ADVANCED CARE PLANNING - CONVERSATION DETAILS
Discussed patient's hospital course, diagnosis, and prognosis in detail with son and health care proxy Jose after he was allowed to visit with his father in the ICU.    Son says he had a good visit with his father, states that he seems "half there," talking about horse racing at times and seeming disoriented at others.  Son has not seen patient in a long time and was surprised at patient's frail appearance and weight loss.  Discussed how the patient has been in between hospitals and rehabs for several months.    Discussed patients progressive heart failure and the fact that patient in some ways is reaching the limits of medical therapies especially with unprovoked arrythmia today and AICD firing, discussed that the patient was fully oriented after his last intubation in November and told son he did not want reintubation.  Talked about how aggressive measures such as CPR would not restore patient to functional status and would likely prolong pain and suffering.  Son related how he saw this with his grandfather and does not want to repeat this for his father.     New TIFFANIE completed with son.  Patient DNR/DNI but modalities such as BiPAP, antibiotics, IV fluid and NGT feedings and pressors are OK.

## 2020-06-17 NOTE — PROGRESS NOTE ADULT - SUBJECTIVE AND OBJECTIVE BOX
No distress    Vital Signs Last 24 Hrs  T(C): 36.7 (06-17-20 @ 11:23), Max: 37.3 (06-16-20 @ 16:43)  T(F): 98 (06-17-20 @ 11:23), Max: 99.2 (06-16-20 @ 16:43)  HR: 81 (06-17-20 @ 11:23) (80 - 87)  BP: 130/70 (06-17-20 @ 11:23) (105/66 - 137/94)  BP(mean): 75 (06-16-20 @ 14:00) (75 - 90)  RR: 16 (06-17-20 @ 11:23) (16 - 25)  SpO2: 100% (06-17-20 @ 11:23) (94% - 100%)    s1s2  coarse BS  soft  sm edema                                       11.4   9.35  )-----------( 290      ( 17 Jun 2020 07:15 )             38.5     17 Jun 2020 07:15    141    |  100    |  26     ----------------------------<  79     3.6     |  34     |  1.44     Ca    10.5       17 Jun 2020 07:15  Phos  1.5       17 Jun 2020 07:15  Mg     1.9       17 Jun 2020 07:15    TPro  7.1    /  Alb  2.6    /  TBili  1.5    /  DBili  x      /  AST  18     /  ALT  <6     /  AlkPhos  75     17 Jun 2020 07:15    LIVER FUNCTIONS - ( 17 Jun 2020 07:15 )  Alb: 2.6 g/dL / Pro: 7.1 g/dL / ALK PHOS: 75 U/L / ALT: <6 U/L / AST: 18 U/L / GGT: x           acetaminophen    Suspension .. 650 milliGRAM(s) Oral every 6 hours PRN  ALBUTerol    90 MICROgram(s) HFA Inhaler 2 Puff(s) Inhalation every 6 hours  apixaban 5 milliGRAM(s) Oral every 12 hours  chlorhexidine 4% Liquid 1 Application(s) Topical <User Schedule>  dextrose 5%. 1000 milliLiter(s) IV Continuous <Continuous>  dextrose 50% Injectable 12.5 Gram(s) IV Push once  dextrose 50% Injectable 25 Gram(s) IV Push once  dextrose 50% Injectable 25 Gram(s) IV Push once  insulin lispro (HumaLOG) corrective regimen sliding scale   SubCutaneous every 6 hours  ipratropium 17 MICROgram(s) HFA Inhaler 2 Puff(s) Inhalation every 6 hours  levothyroxine 25 MICROGram(s) Oral daily  midodrine. 5 milliGRAM(s) Oral three times a day  tamsulosin 0.4 milliGRAM(s) Oral at bedtime    A/P:    Severe CM, EF 25-30%, mod TR/AS  Adm w/resp failure, CHF/COPD exacerbation, COVID +  S/p hemodynamic REY w/peak Cr 2.19 - 6/12   Improved w/marco Cr 1.19 - 6/16  Higher Cr today noted  Florinda d/c-d, f/u PVR, pt w/retention  Start Flomax  Avoid nephrotoxins  Non-PTH hypercalcemia, etiology not clear  Will f/u PTHrP, SPEP  Hold Lasix  Avoid hypotension  Suppl Phos    [phone]

## 2020-06-17 NOTE — PROGRESS NOTE ADULT - ASSESSMENT
Physical Exam  Gen:  Not in distress, awake and following commands  Lung:  Bi lateral air entry, Scattered rhonchi throughout, no wheezing   CV:   RRR + murm  Abd:  Soft, NT/ND.  BS normoactive, no masses to palp  Neuro:  Alert and confused no gross motor/sensory deficits    Assessment:  1. Acute hypercapnic respiratory failure Resolved, Extubated 6/14, non smoker , 911 Exposure - on Registry   2. Acute on chronic Heart failure with reduced EF  3. REY on CKD stage 4 Improving  4. Hypercalcemia improving  5. Afib  6. DM type 2  7. Hypothyroidism - Improving  8. + COVID19 infection  9. Metabolic encephalopathy IMproving    Plan  - pt c/o sob, continue lasix, taper o2 as tolerated  - n/c o2,  - off ivf now  - Nephrology f/u for managment of Ca    - Cont. Anticoagulation for afib with eliquis    - Fingerstick glucose monitoring with coverage for hyperglycemia    - diet as per S&S  continue care on medical floor

## 2020-06-17 NOTE — PROGRESS NOTE ADULT - ASSESSMENT
75 year old male with extensive PMH including CAD, systolic/diastolic heart failure status post AICD, atrial fibrillation status post cardioversion, COPD, type 2 DM, CKD, hypothyroidism who presenting from [facility] with acute hypercarbic respiratory failure requiring intubation. patient was treated for COPD/CHF exacerbation. COVID positive.  patient was extubated 6/14. was on vasopressor for hypovolumic shock, currently off pressor, on midodrine. found to have REY and CKD. currently bring treated for CHF/COPD exacerbation, REY and hypotension. cardiology, renal on board.  palliative was consulted for GOC discussion. currently Full code. patient last intubated 11/19 because "he had heart failure and his carbon dioxide levels went very high," patient has been alternately hospitalized or at rehab since that time.      Problem/Plan - 1:  ·  Problem: Acute hypercapnic,hypoxic respiratory failure.  Plan: resolved     Problem/Plan - 2:  ·  Problem: Acute on chronic Heart failure, systolic and diastolic.  Plan: off Lasix 6/17 due to REY.  No ACE/ARB with renal dysfunction, cautious use of beta blocker as patient on midodrine on admission. cardio on board. resume ace arb arni once BP better. consider aldactone.  EF less than 35. I and Os. daily weight     Problem/Plan - 3:  ·  Problem: REY on Chronic kidney disease (CKD) 3.  Plan: Son states baseline creatinine 1.6, continue to monitor electrolytes and creatinine, Off Neely. creatinine backed up. bladder scan/straight cath. kidney/bladder USG. add flomax. I and O.      Problem/Plan - 4:  ·  Problem: Hypercalcemia.  Plan: Uncertain etiology, normal PTH and Vit D. renal f/u noted. Will f/u PTHrP, SPEP     Problem/Plan - 5:  ·  Problem: Atrial fibrillation.  Plan: continue AC with Eliquis. off BB due to hypotension     Problem/Plan - 6:  Problem: Type 2 diabetes mellitus. Plan: FS with RISS, check Ha1c. 5.0     Problem/Plan - 7:  ·  Problem: Hypothyroid.  Plan: normal TSH, continue synthroid.      Problem/Plan - 8:  ·  Problem: Prophylactic measure.  Plan: On Eliquis, Protonix daily    GOC:  Full Code status. palliative f/u    Son Raúl updated by phone [phone]. 75 year old male with extensive PMH including CAD, systolic/diastolic heart failure status post AICD, atrial fibrillation status post cardioversion, COPD, type 2 DM, CKD, hypothyroidism, chronic hypotension on midodrin who presenting from [facility] with acute hypercarbic respiratory failure requiring intubation. patient was treated for COPD/CHF exacerbation. COVID positive.  patient was extubated 6/14. was on vasopressor for hypovolumic shock, currently off pressor, on midodrine. found to have REY and CKD. currently bring treated for CHF/COPD exacerbation, REY and hypotension. cardiology, renal on board.  palliative was consulted for [facility] discussion. currently Full code. patient last intubated 11/19 because "he had heart failure and his carbon dioxide levels went very high," patient has been alternately hospitalized or at rehab since that time.      Problem/Plan - 1:  ·  Problem: Acute hypercapnic,hypoxic respiratory failure.  Plan: resolved     Problem/Plan - 2:  ·  Problem: Acute on chronic Heart failure, systolic and diastolic.  Plan: off Lasix 6/17 due to REY.  No ACE/ARB with renal dysfunction, cautious use of beta blocker as patient on midodrine on admission. cardio on board. resume ace arb arni once BP better. consider aldactone.  EF less than 35. I and Os. daily weight     Problem/Plan - 3:  ·  Problem: REY on Chronic kidney disease (CKD) 3.  Plan: Son states baseline creatinine 1.6, continue to monitor electrolytes and creatinine, Off Neely. creatinine backed up. bladder scan/straight cath. kidney/bladder USG. add flomax. I and O.      Problem/Plan - 4:  ·  Problem: Hypercalcemia.  Plan: Uncertain etiology, normal PTH and Vit D. renal f/u noted. Will f/u PTHrP, SPEP     Problem/Plan - 5:  ·  Problem: Atrial fibrillation.  Plan: continue AC with Eliquis. off BB due to hypotension. resume as able     Problem/Plan - 6:  Problem: Type 2 diabetes mellitus. Plan: FS with RISS, check Ha1c. 5.0     Problem/Plan - 7:  ·  Problem: Hypothyroid.  Plan: normal TSH, continue synthroid.      Problem/Plan - 8:  ·  Problem: Hypophosphatemia  Plan: normal TSH,PTH. replaced. montior     Problem/Plan - 9:  ·  Problem: dysphagia.  Plan: SLP edil noted. on dysphagia 1 pureed with nectar thick. aspiration precautions.     Problem/Plan - 10:  ·  Problem: severe protein calorie malnutrition.  Plan: needs reinforcement to feeding. nutrition cx noted. added glucerna     Problem/Plan - 8:  ·  Problem: Prophylactic measure.  Plan: On Eliquis, Protonix daily    GOC: Full Code status. palliative f/u      Dispo: needs PT eval for safe DC. not ready today  Son Raúl updated by phone [phone]. 75 year old male with extensive PMH including CAD, systolic/diastolic heart failure status post AICD, atrial fibrillation status post cardioversion, COPD, type 2 DM, CKD, hypothyroidism, chronic hypotension on midodrin who presenting from [facility] with acute hypercarbic respiratory failure requiring intubation. patient was treated for COPD/CHF exacerbation. COVID positive.  patient was extubated 6/14. was on vasopressor for hypovolumic shock, currently off pressor, on midodrine. found to have REY and CKD. currently bring treated for CHF/COPD exacerbation, REY and hypotension. cardiology, renal on board.  palliative was consulted for [facility] discussion. currently Full code. patient last intubated 11/19 because "he had heart failure and his carbon dioxide levels went very high," patient has been alternately hospitalized or at rehab since that time.      Problem/Plan - 1:  ·  Problem: Acute hypercapnic,hypoxic respiratory failure.  Plan: resolved     Problem/Plan - 2:  ·  Problem: Acute on chronic Heart failure, systolic and diastolic.  Plan: off Lasix 6/17 due to REY [ dced by renal].  No ACE/ARB with renal dysfunction, cautious use of beta blocker as patient on midodrine on admission. cardio on board. resume ace arb arni once BP better. consider aldactone.  EF less than 35. I and Os. daily weight     Problem/Plan - 3:  ·  Problem: REY on Chronic kidney disease (CKD) 3.  Plan: Son states baseline creatinine 1.6, continue to monitor electrolytes and creatinine, Off Neely. creatinine backed up. bladder scan/straight cath. kidney/bladder USG. add flomax. I and O.      Problem/Plan - 4:  ·  Problem: Hypercalcemia.  Plan: Uncertain etiology, normal PTH and Vit D. renal f/u noted. Will f/u PTHrP, SPEP     Problem/Plan - 5:  ·  Problem: Atrial fibrillation.  Plan: continue AC with Eliquis. off BB due to hypotension. resume as able     Problem/Plan - 6:  Problem: Type 2 diabetes mellitus. Plan: FS with RISS, check Ha1c. 5.0     Problem/Plan - 7:  ·  Problem: Hypothyroid.  Plan: normal TSH, continue synthroid.      Problem/Plan - 8:  ·  Problem: Hypophosphatemia  Plan: normal TSH,PTH. replaced. montior     Problem/Plan - 9:  ·  Problem: dysphagia.  Plan: SLP eval noted. on dysphagia 1 pureed with nectar thick. aspiration precautions.     Problem/Plan - 10:  ·  Problem: severe protein calorie malnutrition.  Plan: needs reinforcement to feeding. nutrition cx noted. added glucerna     Problem/Plan - 8:  ·  Problem: Prophylactic measure.  Plan: On Eliquis, Protonix daily    GOC: Full Code status. palliative f/u      Dispo: needs PT eval for safe DC. not ready today  Román Olsen updated by phone [phone]. 75 year old male with extensive PMH including CAD, systolic/diastolic heart failure status post AICD, atrial fibrillation status post cardioversion, COPD, type 2 DM, CKD, hypothyroidism, chronic hypotension on midodrin who presenting from [facility] with acute hypercarbic respiratory failure requiring intubation. patient was treated for COPD/CHF exacerbation. COVID positive.  patient was extubated 6/14. was on vasopressor for hypovolumic shock, currently off pressor, on midodrine. found to have REY and CKD. currently bring treated for CHF/COPD exacerbation, REY and hypotension. cardiology, renal on board.  palliative was consulted for [facility] discussion. currently Full code. patient last intubated 11/19 because "he had heart failure and his carbon dioxide levels went very high," patient has been alternately hospitalized or at rehab since that time.      Problem/Plan - 1:  ·  Problem: Acute hypercapnic,hypoxic respiratory failure.  Plan: resolved     Problem/Plan - 2:  ·  Problem: Acute on chronic Heart failure, systolic and diastolic.  Plan: off Lasix 6/17 due to REY [ dced by renal].  No ACE/ARB with renal dysfunction, cautious use of beta blocker as patient on midodrine on admission. cardio on board. resume ace arb arni once BP better. consider aldactone.  EF less than 35. I and Os. daily weight     Problem/Plan - 3:  ·  Problem: REY on Chronic kidney disease (CKD) 3.  Plan: Son states baseline creatinine 1.6, continue to monitor electrolytes and creatinine, Off Neely. creatinine backed up. bladder scan/straight cath. kidney/bladder USG. add flomax. I and O.      Problem/Plan - 4:  ·  Problem: Hypercalcemia.  Plan: Uncertain etiology, normal PTH and Vit D. renal f/u noted. Will f/u PTHrP, SPEP     Problem/Plan - 5:  ·  Problem: Atrial fibrillation.  Plan: continue AC with Eliquis. off BB due to hypotension. resume as able     Problem/Plan - 6:  Problem: Type 2 diabetes mellitus. Plan: FS with RISS, check Ha1c. 5.0     Problem/Plan - 7:  ·  Problem: Hypothyroid.  Plan: normal TSH, continue synthroid.      Problem/Plan - 8:  ·  Problem: Hypophosphatemia  Plan: normal TSH,PTH. replaced. montior     Problem/Plan - 9:  ·  Problem: dysphagia.  Plan: SLP eval noted. on dysphagia 1 pureed with nectar thick. aspiration precautions.     Problem/Plan - 10:  ·  Problem: severe protein calorie malnutrition.  Plan: needs reinforcement to feeding. nutrition cx noted. added glucerna     Problem/Plan - 8:  ·  Problem: Prophylactic measure.  Plan: On Eliquis, Protonix daily    GOC: Full Code status. palliative f/u      Dispo: needs PT eval for safe DC. not ready today    addendum: tele showed torsades. patient confused. denied CP/palpitation/SOB.dizziness. seen by cardio stat. suggested amio IV. micu consulted. transfer to MICU stat. K in am 3.6 mg. and mg 1.9. ordered Krider *2 and mgso4 IV 1 mg. repeat BMP and mg    called Son Raúl to update  [phone]. left VM

## 2020-06-17 NOTE — PROGRESS NOTE ADULT - SUBJECTIVE AND OBJECTIVE BOX
Progress: fatigued, weakened,     Present Symptoms:   Dyspnea: mild  Nausea/Vomiting:   Anxiety:    Depressed Mood:   Fatigue: yes  Loss of appetite: yes  Pain:       no            location:   Review of Systems:  Unable to obtain due to poor mentation]    MEDICATIONS  (STANDING):  ALBUTerol    90 MICROgram(s) HFA Inhaler 2 Puff(s) Inhalation every 6 hours  aMIOdarone Infusion 1 mG/Min (33.3 mL/Hr) IV Continuous <Continuous>  aMIOdarone IVPB 150 milliGRAM(s) IV Intermittent once  apixaban 5 milliGRAM(s) Oral every 12 hours  chlorhexidine 4% Liquid 1 Application(s) Topical <User Schedule>  dextrose 5%. 1000 milliLiter(s) (50 mL/Hr) IV Continuous <Continuous>  dextrose 50% Injectable 12.5 Gram(s) IV Push once  dextrose 50% Injectable 25 Gram(s) IV Push once  dextrose 50% Injectable 25 Gram(s) IV Push once  insulin lispro (HumaLOG) corrective regimen sliding scale   SubCutaneous every 6 hours  ipratropium 17 MICROgram(s) HFA Inhaler 2 Puff(s) Inhalation every 6 hours  levothyroxine 25 MICROGram(s) Oral daily  magnesium sulfate  IVPB 1 Gram(s) IV Intermittent once  midodrine. 5 milliGRAM(s) Oral three times a day  potassium chloride  10 mEq/100 mL IVPB 10 milliEquivalent(s) IV Intermittent every 1 hour  potassium phosphate / sodium phosphate Powder (PHOS-NaK) 1 Packet(s) Oral three times a day  tamsulosin 0.4 milliGRAM(s) Oral at bedtime    MEDICATIONS  (PRN):  acetaminophen    Suspension .. 650 milliGRAM(s) Oral every 6 hours PRN Temp greater or equal to 38C (100.4F), Mild Pain (1 - 3)      PHYSICAL EXAM:  Vital Signs Last 24 Hrs  T(C): 37.6 (17 Jun 2020 14:19), Max: 37.6 (17 Jun 2020 14:19)  T(F): 99.6 (17 Jun 2020 14:19), Max: 99.6 (17 Jun 2020 14:19)  HR: 80 (17 Jun 2020 14:19) (80 - 85)  BP: 125/78 (17 Jun 2020 14:19) (122/86 - 137/94)  BP(mean): --  RR: 16 (17 Jun 2020 14:19) (16 - 22)  SpO2: 98% (17 Jun 2020 14:19) (96% - 100%)  General: alert  oriented x __1-2__      Oral intake ability:  oral feeding w/ assist   Diet: soft, as barbara      LABS:                          11.4   9.35  )-----------( 290      ( 17 Jun 2020 07:15 )             38.5     06-17    141  |  100  |  26<H>  ----------------------------<  79  3.6   |  34<H>  |  1.44<H>    Ca    10.5      17 Jun 2020 07:15  Phos  1.5     06-17  Mg     1.9     06-17    TPro  7.1  /  Alb  2.6<L>  /  TBili  1.5<H>  /  DBili  x   /  AST  18  /  ALT  <6<L>  /  AlkPhos  75  06-17        RADIOLOGY & ADDITIONAL STUDIES:< from: Xray Chest 1 View- PORTABLE-Routine (06.16.20 @ 06:24) >    EXAM:  XR CHEST PORTABLE ROUTINE 1V      PROCEDURE DATE:  06/16/2020        INTERPRETATION:  CLINICAL INDICATION: 75 years  Male with eval chf.    COMPARISON: 6/12/2020    The ET tube and enteric tube have been removed.    AP view of the chest demonstrates mild pulmonary vascular congestion. There is no right pleural effusion. The pacemaker battery pack obscures the left lung base limiting evaluation for pleural effusion. There is no pneumothorax.    The heart is enlarged. The left-sided pacemaker/AICD is unchanged. The aorta is atherosclerotic. The mediastinum and austin cannot be assessed due to projection.    A 3 to 4 mm linear metallic density is reidentified over the right midlung field..    Mild thoracic degenerative changes are present.    IMPRESSION:    Status post removal of ET tube and enteric tube.    Mild pulmonary vascular congestion. Cardiomegaly. Pacemaker/AICD.          ADVANCE DIRECTIVES: full code   Advanced Care Planning discussion total time spent:

## 2020-06-18 LAB
ALBUMIN SERPL ELPH-MCNC: 2.5 G/DL — LOW (ref 3.3–5)
ALP SERPL-CCNC: 73 U/L — SIGNIFICANT CHANGE UP (ref 40–120)
ALT FLD-CCNC: 6 U/L — LOW (ref 10–45)
ANION GAP SERPL CALC-SCNC: 7 MMOL/L — SIGNIFICANT CHANGE UP (ref 5–17)
AST SERPL-CCNC: 18 U/L — SIGNIFICANT CHANGE UP (ref 10–40)
BASOPHILS # BLD AUTO: 0.04 K/UL — SIGNIFICANT CHANGE UP (ref 0–0.2)
BASOPHILS NFR BLD AUTO: 0.3 % — SIGNIFICANT CHANGE UP (ref 0–2)
BILIRUB SERPL-MCNC: 1.6 MG/DL — HIGH (ref 0.2–1.2)
BUN SERPL-MCNC: 28 MG/DL — HIGH (ref 7–23)
CALCIUM SERPL-MCNC: 10 MG/DL — SIGNIFICANT CHANGE UP (ref 8.4–10.5)
CHLORIDE SERPL-SCNC: 101 MMOL/L — SIGNIFICANT CHANGE UP (ref 96–108)
CO2 SERPL-SCNC: 34 MMOL/L — HIGH (ref 22–31)
CREAT SERPL-MCNC: 1.67 MG/DL — HIGH (ref 0.5–1.3)
D DIMER BLD IA.RAPID-MCNC: 593 NG/ML DDU — HIGH
EOSINOPHIL # BLD AUTO: 0.07 K/UL — SIGNIFICANT CHANGE UP (ref 0–0.5)
EOSINOPHIL NFR BLD AUTO: 0.6 % — SIGNIFICANT CHANGE UP (ref 0–6)
FERRITIN SERPL-MCNC: 712 NG/ML — HIGH (ref 30–400)
GLUCOSE BLDC GLUCOMTR-MCNC: 106 MG/DL — HIGH (ref 70–99)
GLUCOSE BLDC GLUCOMTR-MCNC: 94 MG/DL — SIGNIFICANT CHANGE UP (ref 70–99)
GLUCOSE BLDC GLUCOMTR-MCNC: 96 MG/DL — SIGNIFICANT CHANGE UP (ref 70–99)
GLUCOSE SERPL-MCNC: 100 MG/DL — HIGH (ref 70–99)
HCT VFR BLD CALC: 36.4 % — LOW (ref 39–50)
HGB BLD-MCNC: 11.1 G/DL — LOW (ref 13–17)
IMM GRANULOCYTES NFR BLD AUTO: 0.5 % — SIGNIFICANT CHANGE UP (ref 0–1.5)
LDH SERPL L TO P-CCNC: 171 U/L — SIGNIFICANT CHANGE UP (ref 50–242)
LYMPHOCYTES # BLD AUTO: 1.81 K/UL — SIGNIFICANT CHANGE UP (ref 1–3.3)
LYMPHOCYTES # BLD AUTO: 15.3 % — SIGNIFICANT CHANGE UP (ref 13–44)
MAGNESIUM SERPL-MCNC: 2.6 MG/DL — SIGNIFICANT CHANGE UP (ref 1.6–2.6)
MCHC RBC-ENTMCNC: 28.8 PG — SIGNIFICANT CHANGE UP (ref 27–34)
MCHC RBC-ENTMCNC: 30.5 GM/DL — LOW (ref 32–36)
MCV RBC AUTO: 94.3 FL — SIGNIFICANT CHANGE UP (ref 80–100)
MONOCYTES # BLD AUTO: 1.36 K/UL — HIGH (ref 0–0.9)
MONOCYTES NFR BLD AUTO: 11.5 % — SIGNIFICANT CHANGE UP (ref 2–14)
NEUTROPHILS # BLD AUTO: 8.49 K/UL — HIGH (ref 1.8–7.4)
NEUTROPHILS NFR BLD AUTO: 71.8 % — SIGNIFICANT CHANGE UP (ref 43–77)
NRBC # BLD: 0 /100 WBCS — SIGNIFICANT CHANGE UP (ref 0–0)
PHOSPHATE SERPL-MCNC: 2.2 MG/DL — LOW (ref 2.5–4.5)
PLATELET # BLD AUTO: 284 K/UL — SIGNIFICANT CHANGE UP (ref 150–400)
POTASSIUM SERPL-MCNC: 3.8 MMOL/L — SIGNIFICANT CHANGE UP (ref 3.5–5.3)
POTASSIUM SERPL-SCNC: 3.8 MMOL/L — SIGNIFICANT CHANGE UP (ref 3.5–5.3)
PROT SERPL-MCNC: 7.2 G/DL — SIGNIFICANT CHANGE UP (ref 6–8.3)
RBC # BLD: 3.86 M/UL — LOW (ref 4.2–5.8)
RBC # FLD: 18.4 % — HIGH (ref 10.3–14.5)
SODIUM SERPL-SCNC: 142 MMOL/L — SIGNIFICANT CHANGE UP (ref 135–145)
WBC # BLD: 11.83 K/UL — HIGH (ref 3.8–10.5)
WBC # FLD AUTO: 11.83 K/UL — HIGH (ref 3.8–10.5)

## 2020-06-18 PROCEDURE — 99232 SBSQ HOSP IP/OBS MODERATE 35: CPT

## 2020-06-18 PROCEDURE — 99233 SBSQ HOSP IP/OBS HIGH 50: CPT

## 2020-06-18 PROCEDURE — 71045 X-RAY EXAM CHEST 1 VIEW: CPT | Mod: 26,77

## 2020-06-18 PROCEDURE — 71045 X-RAY EXAM CHEST 1 VIEW: CPT | Mod: 26

## 2020-06-18 RX ORDER — ONDANSETRON 8 MG/1
4 TABLET, FILM COATED ORAL ONCE
Refills: 0 | Status: COMPLETED | OUTPATIENT
Start: 2020-06-18 | End: 2020-06-18

## 2020-06-18 RX ORDER — FUROSEMIDE 40 MG
40 TABLET ORAL DAILY
Refills: 0 | Status: DISCONTINUED | OUTPATIENT
Start: 2020-06-18 | End: 2020-06-19

## 2020-06-18 RX ORDER — LEVOTHYROXINE SODIUM 125 MCG
12.5 TABLET ORAL ONCE
Refills: 0 | Status: COMPLETED | OUTPATIENT
Start: 2020-06-18 | End: 2020-06-18

## 2020-06-18 RX ORDER — PANTOPRAZOLE SODIUM 20 MG/1
40 TABLET, DELAYED RELEASE ORAL DAILY
Refills: 0 | Status: DISCONTINUED | OUTPATIENT
Start: 2020-06-18 | End: 2020-06-19

## 2020-06-18 RX ADMIN — Medication 2 PUFF(S): at 09:12

## 2020-06-18 RX ADMIN — Medication 2 PUFF(S): at 21:22

## 2020-06-18 RX ADMIN — ALBUTEROL 2 PUFF(S): 90 AEROSOL, METERED ORAL at 16:07

## 2020-06-18 RX ADMIN — CHLORHEXIDINE GLUCONATE 1 APPLICATION(S): 213 SOLUTION TOPICAL at 07:27

## 2020-06-18 RX ADMIN — ALBUTEROL 2 PUFF(S): 90 AEROSOL, METERED ORAL at 05:30

## 2020-06-18 RX ADMIN — PANTOPRAZOLE SODIUM 40 MILLIGRAM(S): 20 TABLET, DELAYED RELEASE ORAL at 11:26

## 2020-06-18 RX ADMIN — AMIODARONE HYDROCHLORIDE 16.7 MG/MIN: 400 TABLET ORAL at 14:05

## 2020-06-18 RX ADMIN — Medication 12.5 MICROGRAM(S): at 12:30

## 2020-06-18 RX ADMIN — TAMSULOSIN HYDROCHLORIDE 0.4 MILLIGRAM(S): 0.4 CAPSULE ORAL at 22:40

## 2020-06-18 RX ADMIN — ONDANSETRON 4 MILLIGRAM(S): 8 TABLET, FILM COATED ORAL at 15:39

## 2020-06-18 RX ADMIN — Medication 40 MILLIGRAM(S): at 11:59

## 2020-06-18 RX ADMIN — APIXABAN 5 MILLIGRAM(S): 2.5 TABLET, FILM COATED ORAL at 17:31

## 2020-06-18 RX ADMIN — ALBUTEROL 2 PUFF(S): 90 AEROSOL, METERED ORAL at 09:11

## 2020-06-18 RX ADMIN — Medication 2 PUFF(S): at 16:07

## 2020-06-18 RX ADMIN — ALBUTEROL 2 PUFF(S): 90 AEROSOL, METERED ORAL at 21:21

## 2020-06-18 RX ADMIN — Medication 2 PUFF(S): at 05:30

## 2020-06-18 NOTE — PROGRESS NOTE ADULT - SUBJECTIVE AND OBJECTIVE BOX
Events noted, s/p fever, s/p VF, s/p AICD firing, re-adm to ICU, on Amio qtt    Vital Signs Last 24 Hrs  T(C): 37.4 (06-18-20 @ 12:00), Max: 38.6 (06-17-20 @ 15:30)  T(F): 99.4 (06-18-20 @ 12:00), Max: 101.5 (06-17-20 @ 15:30)  HR: 79 (06-18-20 @ 12:00) (76 - 97)  BP: 123/72 (06-18-20 @ 12:00) (118/79 - 135/83)  BP(mean): 88 (06-18-20 @ 12:00) (88 - 111)  RR: 28 (06-18-20 @ 12:00) (16 - 30)  SpO2: 96% (06-18-20 @ 12:00) (92% - 98%)    s1s2  coarse BS  soft  sm edema                                                11.1   11.83 )-----------( 284      ( 18 Jun 2020 05:27 )             36.4     18 Jun 2020 05:27    142    |  101    |  28     ----------------------------<  100    3.8     |  34     |  1.67     Ca    10.0       18 Jun 2020 05:27  Phos  2.2       18 Jun 2020 05:27  Mg     2.6       18 Jun 2020 05:27    TPro  7.2    /  Alb  2.5    /  TBili  1.6    /  DBili  x      /  AST  18     /  ALT  6      /  AlkPhos  73     18 Jun 2020 05:27    LIVER FUNCTIONS - ( 18 Jun 2020 05:27 )  Alb: 2.5 g/dL / Pro: 7.2 g/dL / ALK PHOS: 73 U/L / ALT: 6 U/L / AST: 18 U/L / GGT: x           acetaminophen    Suspension .. 650 milliGRAM(s) Oral every 6 hours PRN  ALBUTerol    90 MICROgram(s) HFA Inhaler 2 Puff(s) Inhalation every 6 hours  aMIOdarone Infusion 0.999 mG/Min IV Continuous <Continuous>  aMIOdarone Infusion 0.5 mG/Min IV Continuous <Continuous>  apixaban 5 milliGRAM(s) Oral every 12 hours  chlorhexidine 4% Liquid 1 Application(s) Topical <User Schedule>  dextrose 5%. 1000 milliLiter(s) IV Continuous <Continuous>  dextrose 50% Injectable 12.5 Gram(s) IV Push once  dextrose 50% Injectable 25 Gram(s) IV Push once  dextrose 50% Injectable 25 Gram(s) IV Push once  furosemide   Injectable 40 milliGRAM(s) IV Push daily  insulin lispro (HumaLOG) corrective regimen sliding scale   SubCutaneous every 6 hours  ipratropium 17 MICROgram(s) HFA Inhaler 2 Puff(s) Inhalation every 6 hours  levothyroxine 25 MICROGram(s) Oral daily  pantoprazole  Injectable 40 milliGRAM(s) IV Push daily  tamsulosin 0.4 milliGRAM(s) Oral at bedtime    A/P:    Severe CM, EF 25-30%, mod TR/AS  Adm w/resp failure, CHF/COPD exacerbation, COVID +  S/p hemodynamic REY w/peak Cr 2.19 - 6/12   Improved w/marco Cr 1.19 - 6/16  Now w/rising Cr in setting of fever, AICD firing  Cardio-renal  Neely d/c-d, f/u PVR, avoid retention  On Flomax  Avoid nephrotoxins  Non-PTH hypercalcemia, etiology not clear  Will f/u PTHrP, SPEP  Hold Lasix  Avoid hypotension  Suppl Phos    [phone] Events noted, s/p fever, s/p VF, s/p AICD firing, re-adm to ICU, on Amio qtt    Vital Signs Last 24 Hrs  T(C): 37.4 (06-18-20 @ 12:00), Max: 38.6 (06-17-20 @ 15:30)  T(F): 99.4 (06-18-20 @ 12:00), Max: 101.5 (06-17-20 @ 15:30)  HR: 79 (06-18-20 @ 12:00) (76 - 97)  BP: 123/72 (06-18-20 @ 12:00) (118/79 - 135/83)  BP(mean): 88 (06-18-20 @ 12:00) (88 - 111)  RR: 28 (06-18-20 @ 12:00) (16 - 30)  SpO2: 96% (06-18-20 @ 12:00) (92% - 98%)    s1s2  coarse BS  soft  sm edema                                                11.1   11.83 )-----------( 284      ( 18 Jun 2020 05:27 )             36.4     18 Jun 2020 05:27    142    |  101    |  28     ----------------------------<  100    3.8     |  34     |  1.67     Ca    10.0       18 Jun 2020 05:27  Phos  2.2       18 Jun 2020 05:27  Mg     2.6       18 Jun 2020 05:27    TPro  7.2    /  Alb  2.5    /  TBili  1.6    /  DBili  x      /  AST  18     /  ALT  6      /  AlkPhos  73     18 Jun 2020 05:27    LIVER FUNCTIONS - ( 18 Jun 2020 05:27 )  Alb: 2.5 g/dL / Pro: 7.2 g/dL / ALK PHOS: 73 U/L / ALT: 6 U/L / AST: 18 U/L / GGT: x           acetaminophen    Suspension .. 650 milliGRAM(s) Oral every 6 hours PRN  ALBUTerol    90 MICROgram(s) HFA Inhaler 2 Puff(s) Inhalation every 6 hours  aMIOdarone Infusion 0.999 mG/Min IV Continuous <Continuous>  aMIOdarone Infusion 0.5 mG/Min IV Continuous <Continuous>  apixaban 5 milliGRAM(s) Oral every 12 hours  chlorhexidine 4% Liquid 1 Application(s) Topical <User Schedule>  dextrose 5%. 1000 milliLiter(s) IV Continuous <Continuous>  dextrose 50% Injectable 12.5 Gram(s) IV Push once  dextrose 50% Injectable 25 Gram(s) IV Push once  dextrose 50% Injectable 25 Gram(s) IV Push once  furosemide   Injectable 40 milliGRAM(s) IV Push daily  insulin lispro (HumaLOG) corrective regimen sliding scale   SubCutaneous every 6 hours  ipratropium 17 MICROgram(s) HFA Inhaler 2 Puff(s) Inhalation every 6 hours  levothyroxine 25 MICROGram(s) Oral daily  pantoprazole  Injectable 40 milliGRAM(s) IV Push daily  tamsulosin 0.4 milliGRAM(s) Oral at bedtime    A/P:    Severe CM, EF 25-30%, mod TR/AS  Adm w/resp failure, CHF/COPD exacerbation, COVID +  S/p hemodynamic REY w/peak Cr 2.19 - 6/12   Improved w/marco Cr 1.19 - 6/16  Now w/rising Cr in setting of fever, AICD firing  Cardio-renal  Neely d/c-d, f/u PVR, avoid retention  On Flomax  Avoid nephrotoxins  Non-PTH hypercalcemia, etiology not clear  However Ca improved  SPEP negative  Will f/u PTHrP  Lasix, fever w/up per CCU team  Avoid hypotension  Suppl Phos  I/Os, daily weights    [phone]

## 2020-06-18 NOTE — SWALLOW BEDSIDE ASSESSMENT ADULT - SLP PERTINENT HISTORY OF CURRENT PROBLEM
As per H&P, patient is a 75 year old male with past medical history of CAD, systolic/diastolic heart failure s/p AIC, afib s/p cardioversion, COPD, type 2 DM, CKD, hypothyroidism. Patient previously evaluated by this service before transfer to CCU. Patient was intubated 6/11 and extubated 6/14. Patient with previous intubation 11/19. for heart failure. Patient found to be COVID positive 6/12.

## 2020-06-18 NOTE — PROGRESS NOTE ADULT - SUBJECTIVE AND OBJECTIVE BOX
Follow-up Critical Care Progress Note  Chief Complaint : COVID-19      patient confused unable to provide history or ros, states does not feel well  pt had to be transferred to ICU as pt had VF with 3 AICD firing  currently on amio drip  hr paced        Allergies :No Known Allergies      PAST MEDICAL & SURGICAL HISTORY:  GERD (gastroesophageal reflux disease)  Chronic kidney disease (CKD): Baseline creatinine 1.6  Type 2 diabetes mellitus  Heart failure, systolic and diastolic  Sciatica  Atrial fibrillation: Status post cardioversion  HTN (hypertension)  Neuropathy  Hypothyroid  COVID-19  Acute kidney failure  Artificial pacemaker      Medications:  MEDICATIONS  (STANDING):  ALBUTerol    90 MICROgram(s) HFA Inhaler 2 Puff(s) Inhalation every 6 hours  aMIOdarone Infusion 0.999 mG/Min (33.3 mL/Hr) IV Continuous <Continuous>  aMIOdarone Infusion 0.5 mG/Min (16.7 mL/Hr) IV Continuous <Continuous>  apixaban 5 milliGRAM(s) Oral every 12 hours  chlorhexidine 4% Liquid 1 Application(s) Topical <User Schedule>  dextrose 5%. 1000 milliLiter(s) (50 mL/Hr) IV Continuous <Continuous>  dextrose 50% Injectable 12.5 Gram(s) IV Push once  dextrose 50% Injectable 25 Gram(s) IV Push once  dextrose 50% Injectable 25 Gram(s) IV Push once  insulin lispro (HumaLOG) corrective regimen sliding scale   SubCutaneous every 6 hours  ipratropium 17 MICROgram(s) HFA Inhaler 2 Puff(s) Inhalation every 6 hours  levothyroxine 25 MICROGram(s) Oral daily  midodrine. 5 milliGRAM(s) Oral three times a day  pantoprazole  Injectable 40 milliGRAM(s) IV Push daily  tamsulosin 0.4 milliGRAM(s) Oral at bedtime    MEDICATIONS  (PRN):  acetaminophen    Suspension .. 650 milliGRAM(s) Oral every 6 hours PRN Temp greater or equal to 38C (100.4F), Mild Pain (1 - 3)      LABS:                        11.1   11.83 )-----------( 284      ( 18 Jun 2020 05:27 )             36.4     06-18    142  |  101  |  28<H>  ----------------------------<  100<H>  3.8   |  34<H>  |  1.67<H>    Ca    10.0      18 Jun 2020 05:27  Phos  2.2     06-18  Mg     2.6     06-18    TPro  7.2  /  Alb  2.5<L>  /  TBili  1.6<H>  /  DBili  x   /  AST  18  /  ALT  6<L>  /  AlkPhos  73  06-18    COVID  06-12-20 @ 08:12  COVID -   Detected<!!>      COVID Biomarkers    06-18-20 @ 05:27 ESR --  ---  CRP --  ---  DDimer  593<H>   ---      ---   Ferritin 712<H>    06-15-20 @ 05:30 ESR --  ---  CRP --  ---  DDimer  339<H>   ---   LDH --   ---   Ferritin 341    06-12-20 @ 08:00 ESR --  ---  CRP 2.99<H>  ---  DDimer  464<H>   ---   LDH --   ---   Ferritin 394      WBC Trend  06-18-20 @ 05:27   -  11.83<H>  06-17-20 @ 07:15   -  9.35  06-16-20 @ 05:08   -  8.29    H/H Trend  06-18-20 @ 05:27   -  11.1<L> / 36.4<L>  06-17-20 @ 07:15   -  11.4<L> / 38.5<L>  06-16-20 @ 05:08   -  10.6<L> / 36.0<L>    Platelet Trend  06-18-20 @ 05:27   -  284  06-17-20 @ 07:15   -  290  06-16-20 @ 05:08   -  250    Trend Bun/Cr  06-18-20 @ 05:27  BUN/CR -  28<H> / 1.67<H>  06-17-20 @ 23:00  BUN/CR -  30<H> / 1.63<H>  06-17-20 @ 07:15  BUN/CR -  26<H> / 1.44<H>  06-16-20 @ 05:08  BUN/CR -  24<H> / 1.19  06-15-20 @ 05:30  BUN/CR -  28<H> / 1.23  06-14-20 @ 05:00  BUN/CR -  32<H> / 1.66<H>  06-13-20 @ 15:14  BUN/CR -  32<H> / 1.92<H>  06-13-20 @ 06:00  BUN/CR -  29<H> / 2.01<H>  06-12-20 @ 13:30  BUN/CR -  31<H> / 2.16<H>  06-12-20 @ 08:00  BUN/CR -  30<H> / 2.19<H>    CULTURES: (if applicable)    Culture - Urine (collected 06-12-20 @ 08:50)  Source: .Urine Clean Catch (Midstream)  Final Report (06-13-20 @ 10:25):    No growth    Culture - Blood (collected 06-12-20 @ 08:00)  Source: .Blood Blood-Peripheral  Final Report (06-17-20 @ 12:00):    No Growth Final    Culture - Blood (collected 06-12-20 @ 08:00)  Source: .Blood Blood-Peripheral  Final Report (06-17-20 @ 12:00):    No Growth Final            CAPILLARY BLOOD GLUCOSE      POCT Blood Glucose.: 102 mg/dL (17 Jun 2020 22:28)      RADIOLOGY  CXR:  Pending      VITALS:  T(C): 36.7 (06-18-20 @ 08:00), Max: 38.6 (06-17-20 @ 15:30)  T(F): 98 (06-18-20 @ 08:00), Max: 101.5 (06-17-20 @ 15:30)  HR: 76 (06-18-20 @ 10:00) (76 - 85)  BP: 125/80 (06-18-20 @ 10:00) (118/79 - 135/83)  BP(mean): 94 (06-18-20 @ 10:00) (90 - 111)  ABP: --  ABP(mean): --  RR: 27 (06-18-20 @ 10:00) (16 - 30)  SpO2: 98% (06-18-20 @ 10:00) (92% - 98%)  CVP(mm Hg): --  CVP(cm H2O): --    Ins and Outs     06-17-20 @ 07:01  -  06-18-20 @ 07:00  --------------------------------------------------------  IN: 550.1 mL / OUT: 0 mL / NET: 550.1 mL    06-18-20 @ 07:01  -  06-18-20 @ 11:36  --------------------------------------------------------  IN: 66.8 mL / OUT: 0 mL / NET: 66.8 mL        Height (cm): 170.2 (06-16-20 @ 16:43)  Weight (kg): 85.9 (06-16-20 @ 16:43)  BMI (kg/m2): 29.7 (06-16-20 @ 16:43)        I&O's Detail    17 Jun 2020 07:01  -  18 Jun 2020 07:00  --------------------------------------------------------  IN:    amiodarone Infusion: 150.3 mL    amiodarone Infusion: 199.8 mL    Oral Fluid: 200 mL  Total IN: 550.1 mL    OUT:  Total OUT: 0 mL    Total NET: 550.1 mL      18 Jun 2020 07:01  -  18 Jun 2020 11:36  --------------------------------------------------------  IN:    amiodarone Infusion: 66.8 mL  Total IN: 66.8 mL    OUT:  Total OUT: 0 mL    Total NET: 66.8 mL

## 2020-06-18 NOTE — PROGRESS NOTE ADULT - ASSESSMENT
A/P 75 year old male with extensive PMH including CAD, systolic/diastolic heart failure status post AICD, atrial fibrillation status post cardioversion, COPD, type 2 DM, CKD, hypothyroidism, chronic hypotension on midodrin who presenting from [facility] with acute hypercarbic respiratory failure requiring intubation, extubated 6/14  now back in ccu for VF, on amio drip, inc confusion.    Per Cardio consult :   discussed his primary cardiologist Dr. Pelletier [phone] ... he does not feel he is a candidate for advanced therapies, and based on negative cardiac cath 2017 - not a candidate for repeat cath at this time        Assessment:  - VF s/p AICD firing on 6/17  on Amio drip  - Acute hypercapnic respiratory failure Resolved, Extubated 6/14, non smoker , 911 Exposure - on Registry   - Acute on chronic Heart failure with reduced EF  - REY on CKD stage 4 worsening  - Hypercalcemia improving  - Afib  - DM type 2  - Hypothyroidism - Improving  - + COVID19 infection  - Metabolic encephalopathy worsening     Plan  - BP stable, hold midodrine, give lasix  - D/w Cardio continue amio x 1 day , do not give oral, restart chf meds if able to be off midodrine    d/w Cardio yesterday no need to tx to SF as had workup already - non ischemic cardiomyopathy and no EP needed.   - pt with worsening mental status will get S&S  f/u to advance diet  - n/c o2,  - Nephrology f/u for management of Ca  - Cont. Anticoagulation for afib with eliquis  - Fingerstick glucose monitoring with coverage for hyperglycemia  - diet as per S&S  - ICU care for today  - Palliatve care f/u pt now made DNR    Palliative:  pt in ccu , chart reviewed this Am, son did visit last evening and filled MOLST w/ ccu PA, pt now DNR , trial bi-pap ok.   Called  and spoke to son Jose today as follow up . Son was very thankful he was allowed to see his father last night. We discussed how visit went, son says he sees how weak his dad has become, but they did have some small conversations but son says he was mostly " in and out" of it.  He said he read and completed the MOLST form w/ PA last night, and feels he made the right decisions but was emotional, much supportive care needed, and given. Son talks about maybe seeing his dad again if possible. Will cont to follow w/ ccu team, cont supportive care pt and family.

## 2020-06-18 NOTE — PROGRESS NOTE ADULT - SUBJECTIVE AND OBJECTIVE BOX
Follow up for vt arrest  SUBJ:    intubated      PMH  GERD (gastroesophageal reflux disease)  Chronic kidney disease (CKD)  Type 2 diabetes mellitus  Heart failure, systolic and diastolic  Sciatica  Heart failure  Atrial fibrillation  HTN (hypertension)  Neuropathy  Diabetes  Hypokalemia  HLD (hyperlipidemia)  Hypothyroid  Bacteremia  Acute respiratory failure  Dehydration  COVID-19  Acute kidney failure      MEDICATIONS  (STANDING):  ALBUTerol    90 MICROgram(s) HFA Inhaler 2 Puff(s) Inhalation every 6 hours  aMIOdarone Infusion 0.999 mG/Min (33.3 mL/Hr) IV Continuous <Continuous>  aMIOdarone Infusion 0.5 mG/Min (16.7 mL/Hr) IV Continuous <Continuous>  apixaban 5 milliGRAM(s) Oral every 12 hours  chlorhexidine 4% Liquid 1 Application(s) Topical <User Schedule>  dextrose 5%. 1000 milliLiter(s) (50 mL/Hr) IV Continuous <Continuous>  dextrose 50% Injectable 12.5 Gram(s) IV Push once  dextrose 50% Injectable 25 Gram(s) IV Push once  dextrose 50% Injectable 25 Gram(s) IV Push once  furosemide   Injectable 40 milliGRAM(s) IV Push daily  insulin lispro (HumaLOG) corrective regimen sliding scale   SubCutaneous every 6 hours  ipratropium 17 MICROgram(s) HFA Inhaler 2 Puff(s) Inhalation every 6 hours  levothyroxine 25 MICROGram(s) Oral daily  pantoprazole  Injectable 40 milliGRAM(s) IV Push daily  tamsulosin 0.4 milliGRAM(s) Oral at bedtime    MEDICATIONS  (PRN):  acetaminophen    Suspension .. 650 milliGRAM(s) Oral every 6 hours PRN Temp greater or equal to 38C (100.4F), Mild Pain (1 - 3)        PHYSICAL EXAM:  Vital Signs Last 24 Hrs  T(C): 37.4 (18 Jun 2020 12:00), Max: 38.6 (17 Jun 2020 15:30)  T(F): 99.4 (18 Jun 2020 12:00), Max: 101.5 (17 Jun 2020 15:30)  HR: 79 (18 Jun 2020 12:00) (76 - 97)  BP: 123/72 (18 Jun 2020 12:00) (118/79 - 135/83)  BP(mean): 88 (18 Jun 2020 12:00) (88 - 111)  RR: 28 (18 Jun 2020 12:00) (16 - 30)  SpO2: 96% (18 Jun 2020 12:00) (92% - 98%)    GENERAL: NAD, intubated  HEAD:  Atraumatic, Normocephalic  EYES: EOMI, PERRLA, conjunctiva and sclera clear  ENT: Moist mucous membranes,  NECK: Supple, No JVD, no bruits  CHEST/LUNG: Clear to percussion bilaterally; No rales, rhonchi, wheezing, or rubs  HEART: Regular rate and rhythm; No murmurs, rubs, or gallops PMI non displaced.  ABDOMEN: Soft, Nontender, Nondistended; Bowel sounds present  EXTREMITIES:  2+ Peripheral Pulses, No clubbing, cyanosis, or edema  SKIN: No rashes or lesions  NERVOUS SYSTEM:  Cranial Nerves II-XII intact      TELEMETRY:        ECG:    < from: 12 Lead ECG (06.17.20 @ 14:42) >  Diagnosis Line Biventricular pacemaker detected  Sinus tachycardia with complete heart block and Ventricular-paced rhythm  qt interval has lengthened  Abnormal ECG  When compared with ECG of 15-LAINE-2020 12:51,  Electronic ventricular pacemaker has replaced Wide QRS rhythm    Confirmed by JOMAR ANDERSON MD ([id number]) on 6/18/2020 8:50:15 AM    < end of copied text >    ECHO:  < from: TTE Echo Complete w/o Contrast w/ Doppler (06.12.20 @ 13:01) >  Summary:   1. Left ventricular ejection fraction, by visual estimation, is 25 to 30%.   2. Severely decreased global left ventricular systolic function.   3. Mildly increased LV wall thickness.   4. Spectral Doppler shows restrictive pattern of left ventricular myocardial filling (Grade III diastolic dysfunction).   5. There is no evidence of pericardial effusion.   6. Thickening and calcification of the anterior and posterior mitral valve leaflets.   7. Moderate tricuspid regurgitation.   8. Mild aortic regurgitation.   9. Moderate aortic valve stenosis.  10. Structurally normalpulmonic valve, with normal leaflet excursion.  11. Dilatation of the ascending aorta.  12. Estimated pulmonary artery systolic pressure is 63.4 mmHg assuming a right atrial pressure of 10 mmHg, which is consistent with moderate pulmonary hypertension.  13. Peak transaortic gradient equals 11.2 mmHg, mean transaortic gradient equals 6.0 mmHg, the calculated aortic valve area equals 1.37 cm² by the continuity equation consistent with moderate aortic stenosis.    [id number] Bradley Gonzalez MD,FACC , Electronically signed on 6/12/2020 at 2:00:14 PM      *** Final ***      BRADLEY GONZALEZ M.D., ATTENDING CARDIOLOGIST  This document has been electronically signed. Jun 12 2020  1:01PM    < end of copied text >    LABS:                        11.1   11.83 )-----------( 284      ( 18 Jun 2020 05:27 )             36.4     06-18    142  |  101  |  28<H>  ----------------------------<  100<H>  3.8   |  34<H>  |  1.67<H>    Ca    10.0      18 Jun 2020 05:27  Phos  2.2     06-18  Mg     2.6     06-18    TPro  7.2  /  Alb  2.5<L>  /  TBili  1.6<H>  /  DBili  x   /  AST  18  /  ALT  6<L>  /  AlkPhos  73  06-18      I&O's Summary    17 Jun 2020 07:01  -  18 Jun 2020 07:00  --------------------------------------------------------  IN: 550.1 mL / OUT: 0 mL / NET: 550.1 mL    18 Jun 2020 07:01  -  18 Jun 2020 13:29  --------------------------------------------------------  IN: 100.2 mL / OUT: 0 mL / NET: 100.2 mL      BNP    RADIOLOGY & ADDITIONAL STUDIES:  < from: Xray Chest 1 View- PORTABLE-Routine (06.18.20 @ 12:18) >  IMPRESSION:    Pulmonary vascular congestion slightly increased from the prior study. Trace right pleural effusion.     Cardiomegaly. Pacemaker/AICD.        RADU HUSSEIN M.D., ATTENDING RADIOLOGIST  This document has been electronically signed. Jun 18 2020 12:42PM    < end of copied text > Follow up for vt arrest  SUBJ:    lethargic      PMH  GERD (gastroesophageal reflux disease)  Chronic kidney disease (CKD)  Type 2 diabetes mellitus  Heart failure, systolic and diastolic  Sciatica  Heart failure  Atrial fibrillation  HTN (hypertension)  Neuropathy  Diabetes  Hypokalemia  HLD (hyperlipidemia)  Hypothyroid  Bacteremia  Acute respiratory failure  Dehydration  COVID-19  Acute kidney failure      MEDICATIONS  (STANDING):  ALBUTerol    90 MICROgram(s) HFA Inhaler 2 Puff(s) Inhalation every 6 hours  aMIOdarone Infusion 0.999 mG/Min (33.3 mL/Hr) IV Continuous <Continuous>  aMIOdarone Infusion 0.5 mG/Min (16.7 mL/Hr) IV Continuous <Continuous>  apixaban 5 milliGRAM(s) Oral every 12 hours  chlorhexidine 4% Liquid 1 Application(s) Topical <User Schedule>  dextrose 5%. 1000 milliLiter(s) (50 mL/Hr) IV Continuous <Continuous>  dextrose 50% Injectable 12.5 Gram(s) IV Push once  dextrose 50% Injectable 25 Gram(s) IV Push once  dextrose 50% Injectable 25 Gram(s) IV Push once  furosemide   Injectable 40 milliGRAM(s) IV Push daily  insulin lispro (HumaLOG) corrective regimen sliding scale   SubCutaneous every 6 hours  ipratropium 17 MICROgram(s) HFA Inhaler 2 Puff(s) Inhalation every 6 hours  levothyroxine 25 MICROGram(s) Oral daily  pantoprazole  Injectable 40 milliGRAM(s) IV Push daily  tamsulosin 0.4 milliGRAM(s) Oral at bedtime    MEDICATIONS  (PRN):  acetaminophen    Suspension .. 650 milliGRAM(s) Oral every 6 hours PRN Temp greater or equal to 38C (100.4F), Mild Pain (1 - 3)        PHYSICAL EXAM:  Vital Signs Last 24 Hrs  T(C): 37.4 (18 Jun 2020 12:00), Max: 38.6 (17 Jun 2020 15:30)  T(F): 99.4 (18 Jun 2020 12:00), Max: 101.5 (17 Jun 2020 15:30)  HR: 79 (18 Jun 2020 12:00) (76 - 97)  BP: 123/72 (18 Jun 2020 12:00) (118/79 - 135/83)  BP(mean): 88 (18 Jun 2020 12:00) (88 - 111)  RR: 28 (18 Jun 2020 12:00) (16 - 30)  SpO2: 96% (18 Jun 2020 12:00) (92% - 98%)    GENERAL: NAD, lethargic  HEAD:  Atraumatic, Normocephalic  EYES: EOMI, PERRLA, conjunctiva and sclera clear  ENT: Moist mucous membranes,  NECK: Supple, No JVD, no bruits  CHEST/LUNG: Clear to percussion bilaterally; No rales, rhonchi, wheezing, or rubs  HEART: Regular rate and rhythm; No murmurs, rubs, or gallops PMI non displaced.  ABDOMEN: Soft, Nontender, Nondistended; Bowel sounds present  EXTREMITIES:  2+ Peripheral Pulses, No clubbing, cyanosis, or edema  SKIN: No rashes or lesions  NERVOUS SYSTEM:  Cranial Nerves II-XII intact      TELEMETRY:        ECG:    < from: 12 Lead ECG (06.17.20 @ 14:42) >  Diagnosis Line Biventricular pacemaker detected  Sinus tachycardia with complete heart block and Ventricular-paced rhythm  qt interval has lengthened  Abnormal ECG  When compared with ECG of 15-LAINE-2020 12:51,  Electronic ventricular pacemaker has replaced Wide QRS rhythm    Confirmed by JOMAR ANDERSON MD ([id number]) on 6/18/2020 8:50:15 AM    < end of copied text >    ECHO:  < from: TTE Echo Complete w/o Contrast w/ Doppler (06.12.20 @ 13:01) >  Summary:   1. Left ventricular ejection fraction, by visual estimation, is 25 to 30%.   2. Severely decreased global left ventricular systolic function.   3. Mildly increased LV wall thickness.   4. Spectral Doppler shows restrictive pattern of left ventricular myocardial filling (Grade III diastolic dysfunction).   5. There is no evidence of pericardial effusion.   6. Thickening and calcification of the anterior and posterior mitral valve leaflets.   7. Moderate tricuspid regurgitation.   8. Mild aortic regurgitation.   9. Moderate aortic valve stenosis.  10. Structurally normalpulmonic valve, with normal leaflet excursion.  11. Dilatation of the ascending aorta.  12. Estimated pulmonary artery systolic pressure is 63.4 mmHg assuming a right atrial pressure of 10 mmHg, which is consistent with moderate pulmonary hypertension.  13. Peak transaortic gradient equals 11.2 mmHg, mean transaortic gradient equals 6.0 mmHg, the calculated aortic valve area equals 1.37 cm² by the continuity equation consistent with moderate aortic stenosis.    [id number] Bradley Gonzalez MD,FACC , Electronically signed on 6/12/2020 at 2:00:14 PM      *** Final ***      BRADLEY GONZALEZ M.D., ATTENDING CARDIOLOGIST  This document has been electronically signed. Jun 12 2020  1:01PM    < end of copied text >    LABS:                        11.1   11.83 )-----------( 284      ( 18 Jun 2020 05:27 )             36.4     06-18    142  |  101  |  28<H>  ----------------------------<  100<H>  3.8   |  34<H>  |  1.67<H>    Ca    10.0      18 Jun 2020 05:27  Phos  2.2     06-18  Mg     2.6     06-18    TPro  7.2  /  Alb  2.5<L>  /  TBili  1.6<H>  /  DBili  x   /  AST  18  /  ALT  6<L>  /  AlkPhos  73  06-18      I&O's Summary    17 Jun 2020 07:01  -  18 Jun 2020 07:00  --------------------------------------------------------  IN: 550.1 mL / OUT: 0 mL / NET: 550.1 mL    18 Jun 2020 07:01  -  18 Jun 2020 13:29  --------------------------------------------------------  IN: 100.2 mL / OUT: 0 mL / NET: 100.2 mL      BNP    RADIOLOGY & ADDITIONAL STUDIES:  < from: Xray Chest 1 View- PORTABLE-Routine (06.18.20 @ 12:18) >  IMPRESSION:    Pulmonary vascular congestion slightly increased from the prior study. Trace right pleural effusion.     Cardiomegaly. Pacemaker/AICD.        RADU HUSSEIN M.D., ATTENDING RADIOLOGIST  This document has been electronically signed. Jun 18 2020 12:42PM    < end of copied text >

## 2020-06-18 NOTE — CHART NOTE - NSCHARTNOTEFT_GEN_A_CORE
Brief Nutrition Note: verbal consult received: pt failed swallow evaluation, now needs NGT for medications/nutrition. Recommend initiate Pivot 1.5 @ 30cc/hr x 24 hours via NGT and increase by 10cc q 4 hrs until goal rate of 50cc/hr x 24 hours reached. Tube feeding will provide 1,800kcals, 113g protein. 900ml H2O at goal rate. Monitor for intolerance (abdominal distention, vomiting, diarrhea). RD to remain available/follow up per protocol. Angie Cantor RDN Pager #[phone].

## 2020-06-18 NOTE — SWALLOW BEDSIDE ASSESSMENT ADULT - SLP GENERAL OBSERVATIONS
Patient alert, however not oriented to person, place or situation. Patient with limited verbal output secondary to altered mental status.

## 2020-06-18 NOTE — PROGRESS NOTE ADULT - SUBJECTIVE AND OBJECTIVE BOX
Patient is a 75y old  Male who presents with a chief complaint of Respiratory failure (18 Jun 2020 13:27)    HPI:  75 year old male with extensive PMH including CAD, systolic/diastolic heart failure status post AICD, atrial fibrillation status post cardioversion, COPD, type 2 DM, CKD, hypothyroidism who presented to [facility] from [facility] with altered mental status.  ABG revealed acute hypercarbic respiratory failure and patient was intubated, COVID positive.  Transferred to ICU for further care.    Currently intubated and cannot elicit further history from patient.  Of note, son spoke to patient via phone yesterday and noted he seemed short of breath at rest with difficulty completing sentences, patient last intubated 11/19 because "he had heart failure and his carbon dioxide levels went very high," patient has been alternately hospitalized or at rehab since that time. (12 Jun 2020 11:03)    Allergies: No Known Allergies    PAST MEDICAL & SURGICAL HISTORY:  GERD (gastroesophageal reflux disease)  Chronic kidney disease (CKD): Baseline creatinine 1.6  Type 2 diabetes mellitus  Heart failure, systolic and diastolic  Sciatica  Atrial fibrillation: Status post cardioversion  HTN (hypertension)  Neuropathy  Hypothyroid  COVID-19  Acute kidney failure  Artificial pacemaker    FAMILY HISTORY:    SOCIAL HISTORY:    Home Medications:    Review of Systems:  Constitutional: no fever, chills, fatigue  Neuro: no headache, numbness, weakness  Resp: no cough, wheezing, shortness of breath  CVS: no chest pain, palpitations, leg swelling  GI: no abdominal pain, nausea, vomiting, diarrhea   : no dysuria, frequency, incontinence  Skin: no itching, burning, rashes, or lesions   Msk: no joint pain or swelling  Psych: no depression, anxiety, mood swings    T(F): 99.4 (06-18-20 @ 12:00), Max: 99.6 (06-18-20 @ 04:00)  HR: 83 (06-18-20 @ 21:00) (76 - 97)  BP: 118/66 (06-18-20 @ 21:00) (89/79 - 135/83)  RR: 19 (06-18-20 @ 21:00) (16 - 30)  SpO2: 95% (06-18-20 @ 21:00)  Wt(kg): --    CAPILLARY BLOOD GLUCOSE      POCT Blood Glucose.: 106 mg/dL (18 Jun 2020 17:37)    I&O's Summary    17 Jun 2020 07:01  -  18 Jun 2020 07:00  --------------------------------------------------------  IN: 550.1 mL / OUT: 0 mL / NET: 550.1 mL    18 Jun 2020 07:01  -  18 Jun 2020 22:16  --------------------------------------------------------  IN: 100.2 mL / OUT: 0 mL / NET: 100.2 mL        Physical Exam:     Gen:  Neuro:  HEENT:  CVS:  Resp:  Abd:  Ext:  Skin:    Meds:    aMIOdarone Infusion 0.999 mG/Min IV Continuous <Continuous>  aMIOdarone Infusion 0.5 mG/Min IV Continuous <Continuous>  furosemide   Injectable 40 milliGRAM(s) IV Push daily  tamsulosin 0.4 milliGRAM(s) Oral at bedtime     dextrose 50% Injectable 12.5 Gram(s) IV Push once  dextrose 50% Injectable 25 Gram(s) IV Push once  dextrose 50% Injectable 25 Gram(s) IV Push once  insulin lispro (HumaLOG) corrective regimen sliding scale   SubCutaneous every 6 hours  levothyroxine 25 MICROGram(s) Oral daily     ALBUTerol    90 MICROgram(s) HFA Inhaler 2 Puff(s) Inhalation every 6 hours  ipratropium 17 MICROgram(s) HFA Inhaler 2 Puff(s) Inhalation every 6 hours     acetaminophen    Suspension .. 650 milliGRAM(s) Oral every 6 hours PRN        apixaban 5 milliGRAM(s) Oral every 12 hours     pantoprazole  Injectable 40 milliGRAM(s) IV Push daily        dextrose 5%. 1000 milliLiter(s) IV Continuous <Continuous>        chlorhexidine 4% Liquid 1 Application(s) Topical <User Schedule>                              11.1   11.83 )-----------( 284      ( 18 Jun 2020 05:27 )             36.4       06-18    142  |  101  |  28<H>  ----------------------------<  100<H>  3.8   |  34<H>  |  1.67<H>    Ca    10.0      18 Jun 2020 05:27  Phos  2.2     06-18  Mg     2.6     06-18    TPro  7.2  /  Alb  2.5<L>  /  TBili  1.6<H>  /  DBili  x   /  AST  18  /  ALT  6<L>  /  AlkPhos  73  06-18                      Radiology: ***    Bedside lung ultrasound: ***    Bedside ECHO: ***    CODE STATUS: ***  [facility] discussion: Y  Critical care time spent (mins): *** Patient is a 75y old  Male who presents with a chief complaint of Respiratory failure (18 Jun 2020 13:27)    HPI:  75 year old male with extensive PMH including CAD, systolic/diastolic heart failure status post AICD, atrial fibrillation status post cardioversion, COPD, type 2 DM, CKD, hypothyroidism who presented with AMS found to have acute hypercapnic respiratory failure and subsequently intubated. Hospital course further complicated by COVID 19+ and multiple episodes of VF with AICD firing. Patient subsequently extubated.    At bedside resting comfortably, saturating well on 2L NC.     Allergies: No Known Allergies    PAST MEDICAL & SURGICAL HISTORY:  GERD (gastroesophageal reflux disease)  Chronic kidney disease (CKD): Baseline creatinine 1.6  Type 2 diabetes mellitus  Heart failure, systolic and diastolic  Sciatica  Atrial fibrillation: Status post cardioversion  HTN (hypertension)  Neuropathy  Hypothyroid  COVID-19  Acute kidney failure  Artificial pacemaker    FAMILY HISTORY:    SOCIAL HISTORY:    Home Medications:    Review of Systems:  Confused, not accurate ROS      T(F): 99.4 (06-18-20 @ 12:00), Max: 99.6 (06-18-20 @ 04:00)  HR: 83 (06-18-20 @ 21:00) (76 - 97)  BP: 118/66 (06-18-20 @ 21:00) (89/79 - 135/83)  RR: 19 (06-18-20 @ 21:00) (16 - 30)  SpO2: 95% (06-18-20 @ 21:00)  Wt(kg): --    CAPILLARY BLOOD GLUCOSE      POCT Blood Glucose.: 106 mg/dL (18 Jun 2020 17:37)    I&O's Summary    17 Jun 2020 07:01  -  18 Jun 2020 07:00  --------------------------------------------------------  IN: 550.1 mL / OUT: 0 mL / NET: 550.1 mL    18 Jun 2020 07:01  -  18 Jun 2020 22:16  --------------------------------------------------------  IN: 100.2 mL / OUT: 0 mL / NET: 100.2 mL        Physical Exam:     Gen: NAD  Neuro: awake, confused  CVS: paced rythm  Resp: CTA  Abd: soft, NT  Ext: warm, dry    Meds:    aMIOdarone Infusion 0.999 mG/Min IV Continuous <Continuous>  aMIOdarone Infusion 0.5 mG/Min IV Continuous <Continuous>  furosemide   Injectable 40 milliGRAM(s) IV Push daily  tamsulosin 0.4 milliGRAM(s) Oral at bedtime     dextrose 50% Injectable 12.5 Gram(s) IV Push once  dextrose 50% Injectable 25 Gram(s) IV Push once  dextrose 50% Injectable 25 Gram(s) IV Push once  insulin lispro (HumaLOG) corrective regimen sliding scale   SubCutaneous every 6 hours  levothyroxine 25 MICROGram(s) Oral daily     ALBUTerol    90 MICROgram(s) HFA Inhaler 2 Puff(s) Inhalation every 6 hours  ipratropium 17 MICROgram(s) HFA Inhaler 2 Puff(s) Inhalation every 6 hours     acetaminophen    Suspension .. 650 milliGRAM(s) Oral every 6 hours PRN        apixaban 5 milliGRAM(s) Oral every 12 hours     pantoprazole  Injectable 40 milliGRAM(s) IV Push daily        dextrose 5%. 1000 milliLiter(s) IV Continuous <Continuous>        chlorhexidine 4% Liquid 1 Application(s) Topical <User Schedule>                              11.1   11.83 )-----------( 284      ( 18 Jun 2020 05:27 )             36.4       06-18    142  |  101  |  28<H>  ----------------------------<  100<H>  3.8   |  34<H>  |  1.67<H>    Ca    10.0      18 Jun 2020 05:27  Phos  2.2     06-18  Mg     2.6     06-18    TPro  7.2  /  Alb  2.5<L>  /  TBili  1.6<H>  /  DBili  x   /  AST  18  /  ALT  6<L>  /  AlkPhos  73  06-18                      Radiology:     EXAM:  XR CHEST PORTABLE URGENT 1V      PROCEDURE DATE:  06/18/2020        INTERPRETATION:    DATE OF STUDY: 6/18/2020 at 3:39PM    PRIOR:Earlier 6/18/20 exam.    CLINICAL INDICATION: Dysphagia; assess NG tube.    TECHNIQUE: portable chest.    FINDINGS:   There is left-sided AICD in situ. The battery pack partially obscures the left lower lung.  NG tube in stomach - tip is off the film.  There is stable cardiomegaly.  Interval improvement in pulmonary vascular congestion..  No significant pleural effusion.  No pneumothorax.  Tiny metallic clip again seen over lower right lung.    IMPRESSION:   1. NG tube in stomach.  2. Interval improvement in pulmonary vascular congestive changes.                    GUNNAR AGUILAR M.D., ATTENDING RADIOLOGIST  This document has been electronically signed. Jun 18 2020  3:59PM          Assessment/Plan:  75 year old male with extensive PMH including CAD, systolic/diastolic heart failure status post AICD, atrial fibrillation status post cardioversion, COPD, type 2 DM, CKD, hypothyroidism who presented with AMS found to have acute hypercapnic respiratory failure and subsequently intubated. Hospital course further complicated by COVID 19+ and multiple episodes of VF with AICD firing. Patient subsequently extubated.    -Episodes of VF/Torsades de pointes. Will discontinue Amiodarone gtt per Cards reccs given possible QT prolongation. Check AM EKG. Optimize electrolytes, keep K >4 and Mg >2.  -Acute hypercapnic respiratory failure s/p extubation. Saturating well on NC 2L, titrate to maintain o2 sat >88%  -Acute CHF exacerbation w/ pulmonary edema; resume Lasix as BP now improved. Maintain net negative balance  -Failed swallow eval; NGT placed for meds and feeds.

## 2020-06-18 NOTE — SWALLOW BEDSIDE ASSESSMENT ADULT - PHARYNGEAL PHASE
Delayed pharyngeal swallow/Decreased laryngeal elevation/Oxygen desaturation to 95/Cough post oral intake/Multiple swallows Absent trigger of swallow

## 2020-06-18 NOTE — PROGRESS NOTE ADULT - SUBJECTIVE AND OBJECTIVE BOX
Progress: back in ccu on amio drip     Present Symptoms:   Dyspnea: mild  Nausea/Vomiting:   Anxiety:    Depressed Mood:   Fatigue:   Loss of appetite: yes  Pain:                   location:   Review of Systems: Unable to obtain due to poor mentation]    MEDICATIONS  (STANDING):  ALBUTerol    90 MICROgram(s) HFA Inhaler 2 Puff(s) Inhalation every 6 hours  aMIOdarone Infusion 0.999 mG/Min (33.3 mL/Hr) IV Continuous <Continuous>  aMIOdarone Infusion 0.5 mG/Min (16.7 mL/Hr) IV Continuous <Continuous>  apixaban 5 milliGRAM(s) Oral every 12 hours  chlorhexidine 4% Liquid 1 Application(s) Topical <User Schedule>  dextrose 5%. 1000 milliLiter(s) (50 mL/Hr) IV Continuous <Continuous>  dextrose 50% Injectable 12.5 Gram(s) IV Push once  dextrose 50% Injectable 25 Gram(s) IV Push once  dextrose 50% Injectable 25 Gram(s) IV Push once  furosemide   Injectable 40 milliGRAM(s) IV Push daily  insulin lispro (HumaLOG) corrective regimen sliding scale   SubCutaneous every 6 hours  ipratropium 17 MICROgram(s) HFA Inhaler 2 Puff(s) Inhalation every 6 hours  levothyroxine 25 MICROGram(s) Oral daily  pantoprazole  Injectable 40 milliGRAM(s) IV Push daily  tamsulosin 0.4 milliGRAM(s) Oral at bedtime    MEDICATIONS  (PRN):  acetaminophen    Suspension .. 650 milliGRAM(s) Oral every 6 hours PRN Temp greater or equal to 38C (100.4F), Mild Pain (1 - 3)      PHYSICAL EXAM:  Vital Signs Last 24 Hrs  T(C): 37.4 (18 Jun 2020 12:00), Max: 38.6 (17 Jun 2020 15:30)  T(F): 99.4 (18 Jun 2020 12:00), Max: 101.5 (17 Jun 2020 15:30)  HR: 79 (18 Jun 2020 12:00) (76 - 97)  BP: 123/72 (18 Jun 2020 12:00) (118/79 - 135/83)  BP(mean): 88 (18 Jun 2020 12:00) (88 - 111)  RR: 28 (18 Jun 2020 12:00) (16 - 30)  SpO2: 96% (18 Jun 2020 12:00) (92% - 98%)  General: alert  , confused       Oral intake ability:  oral feeding s/s pending   Diet: as barbara per speech eval      LABS:                          11.1   11.83 )-----------( 284      ( 18 Jun 2020 05:27 )             36.4     06-18    142  |  101  |  28<H>  ----------------------------<  100<H>  3.8   |  34<H>  |  1.67<H>    Ca    10.0      18 Jun 2020 05:27  Phos  2.2     06-18  Mg     2.6     06-18    TPro  7.2  /  Alb  2.5<L>  /  TBili  1.6<H>  /  DBili  x   /  AST  18  /  ALT  6<L>  /  AlkPhos  73  06-18        RADIOLOGY & ADDITIONAL STUDIES:< from: Xray Chest 1 View- PORTABLE-Routine (06.18.20 @ 12:18) >  EXAM:  XR CHEST PORTABLE ROUTINE 1V      PROCEDURE DATE:  06/18/2020        INTERPRETATION:  CLINICAL INDICATION: 75 years  Male with eval chf.    COMPARISON: 6/16/2020    The left costophrenic angle is partially omitted.    AP view of the chest demonstrates mild pulmonary vascular congestion and perivascular haziness, slightly increased from the prior study. There is trace right pleural effusion. The pacemaker battery pack obscures the left  lung base limiting evaluation for pleural effusion. There is no pneumothorax.    The heart is enlarged. The left-sided pacemaker/AICD is unchanged. The aorta  is atherosclerotic. The mediastinum and [place]in cannot be assessed due to  projection.    A 3 to 4 mm linear metallic density is reidentified over the right midlung  field..    Mild thoracic degenerative changes are present.    IMPRESSION:    Pulmonary vascular congestion slightly increased from the prior study. Trace right pleural effusion.     Cardiomegaly. Pacemaker/AICD.          ADVANCE DIRECTIVES: MOLST  DNR/DNI , trial bipap ok , ng ok    Advanced Care Planning discussion total time spent:

## 2020-06-18 NOTE — SWALLOW BEDSIDE ASSESSMENT ADULT - SWALLOW EVAL: DIAGNOSIS
Pt presents with oropharyngeal dysphagia. Pt trialed with thin liquids, nectar thickened liquids, puree.  Pt with adequate orientation to feeding utensils, reduced labial seal causing mild left anterior loss of bolus, perseverative chew noted for puree consistencies and reduced formation of the bolus.  Pt with latent AP transport across all consistencies. Reduced coordination between respiration and deglutition noted. Pt with swallow delay. Overt s/s of penetration/aspiration noted for thin liquids. PO trials stopped as pt began to c/o nausea. Recommend puree with nectar at this time.
Patient presents with suspected severe oropharyngeal dysphagia. Patient given trials of thin, nectar, honey, and puree. Patient with oral phase deficits marked by poor containment of bolus and anterior loss of nectar, honey and puree. Patient contained 2 trials of thin liquids. Pharyngeal phase marked by suspected reduced hyolaryngeal elevation and excursion, as well as suspected delayed swallow onset time. Patient with suspected aspiration on trials of thin evidenced by immediate cough and wet vocal quality.  Pharyngeal phase could not be assessed on trials of nectar, honey, and puree due to severe oral phase deficits.  Patient's clinical presentation with thin liquids demonstrates patient's ability to swallow (unable to rule out aspiration at this time), however patient's cognitive deficits significantly impeding intake to meet adequate means of nutrition and hydration at this time, therefore short term alternative means recommended. Patient to be follow up on as schedule allows.

## 2020-06-18 NOTE — SWALLOW BEDSIDE ASSESSMENT ADULT - ASR SWALLOW ASPIRATION MONITOR
fever/pneumonia/throat clearing/upper respiratory infection/cough/change of breathing pattern/gurgly voice
position upright (89Y)/oral hygiene

## 2020-06-18 NOTE — PROGRESS NOTE ADULT - ASSESSMENT
Physical Exam  Gen:  Not in distress, awake and following commands  Lung:  Bi lateral air entry, Scattered rhonchi throughout, no wheezing   CV:   RRR + murm  Abd:  Soft, NT/ND.  BS normoactive, no masses to palp  Neuro:  Alert and confused no gross motor/sensory deficits    Assessment:  - VF s/p AICD firing on 6/17  on Amio drip  - Acute hypercapnic respiratory failure Resolved, Extubated 6/14, non smoker , 911 Exposure - on Registry   - Acute on chronic Heart failure with reduced EF  - REY on CKD stage 4 worsening  - Hypercalcemia improving  - Afib  - DM type 2  - Hypothyroidism - Improving  - + COVID19 infection  - Metabolic encephalopathy worsening     Plan  - BP stable, hold midodrine, give lasix  - D/w Cardio continue amio x 1 day , do not give oral, restart chf meds if able to be off midodrine    d/w Cardio yesterday no need to tx to [facility] as had workup already - non ischemic cardiomyopathy and no EP needed.   - pt with worsening mental status will get S&S  f/u to advance diet  - n/c o2,  - Nephrology f/u for management of Ca  - Cont. Anticoagulation for afib with eliquis  - Fingerstick glucose monitoring with coverage for hyperglycemia  - diet as per S&S  - ICU care for today  - Palliatve care f/u pt now made DNR  - d/w Son Jose [phone] Confirmed DNR/I

## 2020-06-18 NOTE — SWALLOW BEDSIDE ASSESSMENT ADULT - SWALLOW EVAL: RECOMMENDED DIET
puree with nectar thickened liquids
Recommend alternate means of nutrition and hydration at time as patient is at high risk of aspiration

## 2020-06-18 NOTE — SWALLOW BEDSIDE ASSESSMENT ADULT - COMMENTS
Patient received on 2 LPM O2 via nasal cannula. Patient with white blood cell count 11.83 on June 18th. Chest x-ray from 6/18 revealed pulmonary vascular congestion increasing from previous study trace right pleural effusion. No head CT or MRI available. Patient expectorated full bolus, therefore swallow was not initiated. Patient with anterior loss of full bolus, therefore swallow was not initiated Patient did not initiate swallow. Food was removed from oral cavity. Oral care was given.

## 2020-06-19 LAB
ALBUMIN SERPL ELPH-MCNC: 2.4 G/DL — LOW (ref 3.3–5)
ALP SERPL-CCNC: 62 U/L — SIGNIFICANT CHANGE UP (ref 40–120)
ALT FLD-CCNC: 7 U/L — LOW (ref 10–45)
ANION GAP SERPL CALC-SCNC: 5 MMOL/L — SIGNIFICANT CHANGE UP (ref 5–17)
AST SERPL-CCNC: 20 U/L — SIGNIFICANT CHANGE UP (ref 10–40)
BASOPHILS # BLD AUTO: 0.05 K/UL — SIGNIFICANT CHANGE UP (ref 0–0.2)
BASOPHILS NFR BLD AUTO: 0.4 % — SIGNIFICANT CHANGE UP (ref 0–2)
BILIRUB SERPL-MCNC: 1.3 MG/DL — HIGH (ref 0.2–1.2)
BUN SERPL-MCNC: 35 MG/DL — HIGH (ref 7–23)
CALCIUM SERPL-MCNC: 9.6 MG/DL — SIGNIFICANT CHANGE UP (ref 8.4–10.5)
CHLORIDE SERPL-SCNC: 101 MMOL/L — SIGNIFICANT CHANGE UP (ref 96–108)
CO2 SERPL-SCNC: 36 MMOL/L — HIGH (ref 22–31)
CREAT SERPL-MCNC: 1.89 MG/DL — HIGH (ref 0.5–1.3)
EOSINOPHIL # BLD AUTO: 0.21 K/UL — SIGNIFICANT CHANGE UP (ref 0–0.5)
EOSINOPHIL NFR BLD AUTO: 1.7 % — SIGNIFICANT CHANGE UP (ref 0–6)
GLUCOSE BLDC GLUCOMTR-MCNC: 81 MG/DL — SIGNIFICANT CHANGE UP (ref 70–99)
GLUCOSE BLDC GLUCOMTR-MCNC: 85 MG/DL — SIGNIFICANT CHANGE UP (ref 70–99)
GLUCOSE SERPL-MCNC: 80 MG/DL — SIGNIFICANT CHANGE UP (ref 70–99)
HCT VFR BLD CALC: 32.3 % — LOW (ref 39–50)
HGB BLD-MCNC: 9.7 G/DL — LOW (ref 13–17)
IMM GRANULOCYTES NFR BLD AUTO: 0.3 % — SIGNIFICANT CHANGE UP (ref 0–1.5)
LYMPHOCYTES # BLD AUTO: 1.78 K/UL — SIGNIFICANT CHANGE UP (ref 1–3.3)
LYMPHOCYTES # BLD AUTO: 14 % — SIGNIFICANT CHANGE UP (ref 13–44)
MAGNESIUM SERPL-MCNC: 1.6 MG/DL — SIGNIFICANT CHANGE UP (ref 1.6–2.6)
MCHC RBC-ENTMCNC: 29 PG — SIGNIFICANT CHANGE UP (ref 27–34)
MCHC RBC-ENTMCNC: 30 GM/DL — LOW (ref 32–36)
MCV RBC AUTO: 96.4 FL — SIGNIFICANT CHANGE UP (ref 80–100)
MONOCYTES # BLD AUTO: 1.16 K/UL — HIGH (ref 0–0.9)
MONOCYTES NFR BLD AUTO: 9.1 % — SIGNIFICANT CHANGE UP (ref 2–14)
NEUTROPHILS # BLD AUTO: 9.48 K/UL — HIGH (ref 1.8–7.4)
NEUTROPHILS NFR BLD AUTO: 74.5 % — SIGNIFICANT CHANGE UP (ref 43–77)
NRBC # BLD: 0 /100 WBCS — SIGNIFICANT CHANGE UP (ref 0–0)
PHOSPHATE SERPL-MCNC: 3.1 MG/DL — SIGNIFICANT CHANGE UP (ref 2.5–4.5)
PLATELET # BLD AUTO: 259 K/UL — SIGNIFICANT CHANGE UP (ref 150–400)
POTASSIUM SERPL-MCNC: 3.9 MMOL/L — SIGNIFICANT CHANGE UP (ref 3.5–5.3)
POTASSIUM SERPL-SCNC: 3.9 MMOL/L — SIGNIFICANT CHANGE UP (ref 3.5–5.3)
PROT SERPL-MCNC: 6.8 G/DL — SIGNIFICANT CHANGE UP (ref 6–8.3)
RBC # BLD: 3.35 M/UL — LOW (ref 4.2–5.8)
RBC # FLD: 18.2 % — HIGH (ref 10.3–14.5)
SODIUM SERPL-SCNC: 142 MMOL/L — SIGNIFICANT CHANGE UP (ref 135–145)
WBC # BLD: 12.72 K/UL — HIGH (ref 3.8–10.5)
WBC # FLD AUTO: 12.72 K/UL — HIGH (ref 3.8–10.5)

## 2020-06-19 PROCEDURE — 71045 X-RAY EXAM CHEST 1 VIEW: CPT | Mod: 26

## 2020-06-19 PROCEDURE — 71045 X-RAY EXAM CHEST 1 VIEW: CPT | Mod: 26,77

## 2020-06-19 RX ORDER — POTASSIUM CHLORIDE 20 MEQ
20 PACKET (EA) ORAL ONCE
Refills: 0 | Status: DISCONTINUED | OUTPATIENT
Start: 2020-06-19 | End: 2020-06-19

## 2020-06-19 RX ORDER — MAGNESIUM SULFATE 500 MG/ML
1 VIAL (ML) INJECTION
Refills: 0 | Status: COMPLETED | OUTPATIENT
Start: 2020-06-19 | End: 2020-06-19

## 2020-06-19 RX ORDER — PANTOPRAZOLE SODIUM 20 MG/1
40 TABLET, DELAYED RELEASE ORAL DAILY
Refills: 0 | Status: DISCONTINUED | OUTPATIENT
Start: 2020-06-19 | End: 2020-06-26

## 2020-06-19 RX ORDER — MAGNESIUM SULFATE 500 MG/ML
2 VIAL (ML) INJECTION ONCE
Refills: 0 | Status: DISCONTINUED | OUTPATIENT
Start: 2020-06-19 | End: 2020-06-19

## 2020-06-19 RX ORDER — METOPROLOL TARTRATE 50 MG
25 TABLET ORAL
Refills: 0 | Status: DISCONTINUED | OUTPATIENT
Start: 2020-06-19 | End: 2020-06-25

## 2020-06-19 RX ADMIN — CHLORHEXIDINE GLUCONATE 1 APPLICATION(S): 213 SOLUTION TOPICAL at 07:42

## 2020-06-19 RX ADMIN — Medication 2 PUFF(S): at 05:07

## 2020-06-19 RX ADMIN — Medication 100 GRAM(S): at 15:33

## 2020-06-19 RX ADMIN — ALBUTEROL 2 PUFF(S): 90 AEROSOL, METERED ORAL at 05:07

## 2020-06-19 RX ADMIN — ALBUTEROL 2 PUFF(S): 90 AEROSOL, METERED ORAL at 09:40

## 2020-06-19 RX ADMIN — APIXABAN 5 MILLIGRAM(S): 2.5 TABLET, FILM COATED ORAL at 05:58

## 2020-06-19 RX ADMIN — Medication 25 MICROGRAM(S): at 05:58

## 2020-06-19 RX ADMIN — Medication 2 PUFF(S): at 22:52

## 2020-06-19 RX ADMIN — Medication 100 GRAM(S): at 13:30

## 2020-06-19 RX ADMIN — ALBUTEROL 2 PUFF(S): 90 AEROSOL, METERED ORAL at 22:52

## 2020-06-19 RX ADMIN — Medication 40 MILLIGRAM(S): at 05:58

## 2020-06-19 RX ADMIN — Medication 2 PUFF(S): at 09:43

## 2020-06-19 NOTE — PROGRESS NOTE ADULT - ASSESSMENT
Physical Exam  Gen:  Not in distress, awake and following commands  Lung:  Bi lateral air entry, Scattered rales , no wheezing   CV:   RRR + murm  Abd:  Soft, NT/ND.  BS normoactive, no masses to palp  Neuro:  Alert and confused no gross motor/sensory deficits    Assessment:  - VF s/p AICD firing on 6/17  S/p  Amio drip  - Acute hypercapnic respiratory failure Resolved, Extubated 6/14, non smoker , 911 Exposure - on Registry   - Acute on chronic Heart failure with reduced EF  - REY on CKD stage 4 worsening  - Hypercalcemia   - Afib  - DM type 2  - Hypothyroidism - Improving  - + COVID19 infection  - Metabolic encephalopathy worsening     Plan  - BP stable, hold midodrine, Hold lasix as cr increasing, start BB, keep Mg >2.0 and K >4.0  - Cardio f/u to optimize meds  - pt with waxing and waning mental status has NGT for tube feeding at this time , and requiring mittens to avoid removal.  - n/c o2,  - Nephrology f/u for management of Ca  - Cont. Anticoagulation for afib with eliquis  - Fingerstick glucose monitoring with coverage for hyperglycemia  - diet as per S&S  -  Can transfer to medical floor with TELE, case d/w Dr. Mendoza who will accept pt upon transfer  - Palliatve care f/u pt now made DNR  - d/w Román Garcia [phone] Confirmed DNR/I

## 2020-06-19 NOTE — CHART NOTE - NSCHARTNOTEFT_GEN_A_CORE
Nutrition Follow Up Note  Hospital Course (Per Electronic Medical Record):   Source: Medical Record [X] Patient [X] Family [X] Nursing Staff [X]     Diet:     Current Weight:    Pertinent Medications: MEDICATIONS  (STANDING):  ALBUTerol    90 MICROgram(s) HFA Inhaler 2 Puff(s) Inhalation every 6 hours  apixaban 5 milliGRAM(s) Oral every 12 hours  chlorhexidine 4% Liquid 1 Application(s) Topical <User Schedule>  dextrose 5%. 1000 milliLiter(s) (50 mL/Hr) IV Continuous <Continuous>  dextrose 50% Injectable 12.5 Gram(s) IV Push once  dextrose 50% Injectable 25 Gram(s) IV Push once  dextrose 50% Injectable 25 Gram(s) IV Push once  insulin lispro (HumaLOG) corrective regimen sliding scale   SubCutaneous every 6 hours  ipratropium 17 MICROgram(s) HFA Inhaler 2 Puff(s) Inhalation every 6 hours  levothyroxine 25 MICROGram(s) Oral daily  pantoprazole   Suspension 40 milliGRAM(s) Enteral Tube daily  tamsulosin 0.4 milliGRAM(s) Oral at bedtime    MEDICATIONS  (PRN):  acetaminophen    Suspension .. 650 milliGRAM(s) Oral every 6 hours PRN Temp greater or equal to 38C (100.4F), Mild Pain (1 - 3)      Pertinent Labs:  06-19 Na142 mmol/L Glu 80 mg/dL K+ 3.9 mmol/L Cr  1.89 mg/dL<H> BUN 35 mg/dL<H> 06-19 Phos 3.1 mg/dL 06-19 Alb 2.4 g/dL<L>        Skin: Unstageable coccyx Sacrum Stage II    Edema: (6/14) (+1) generalized edema     Last BM: on (6/16)    Estimated Needs:   [X] No Change since Previous Assessment      Previous Nutrition Diagnosis: Inadequate Nutrition Intake     Nutrition Diagnosis is [X] Ongoing       New Nutrition Diagnosis: [X] Dysphagia related to dementia as evidence by need for EN regimen       Interventions:   1. Recommend Pivot 1.5 bolus   2.      Monitoring & Evaluation: will monitor:  [X] Weights   [X] PO Intake   [X] Follow Up (Per Protocol)  [X] Tolerance to Diet Prescription       RD to follow as per Nutrition protocol  Maribel Savage RDN Nutrition Follow Up Note  Hospital Course (Per Electronic Medical Record):   Source: Medical Record [X] Patient [X] Family [X] Nursing Staff [X]     Diet: NPO     Patient NPO due to dysphagia as per SLP evaluation. NGT noted , spoke with PA regarding EN feeds will suggest bolus feeds of Pivot 1.5 300cc bolus 4 day which will provide 1800cal/113g protein/900ml H2O.Patient noted with Stage 2 sacrum, unstageable coccyx , suggest MVI, Vit C & Zinc added.     Current Weight: (6/170 186/84.4kg , ? admit weigh due to recorded follow up weights,     Pertinent Medications: MEDICATIONS  (STANDING):  ALBUTerol    90 MICROgram(s) HFA Inhaler 2 Puff(s) Inhalation every 6 hours  apixaban 5 milliGRAM(s) Oral every 12 hours  chlorhexidine 4% Liquid 1 Application(s) Topical <User Schedule>  dextrose 5%. 1000 milliLiter(s) (50 mL/Hr) IV Continuous <Continuous>  dextrose 50% Injectable 12.5 Gram(s) IV Push once  dextrose 50% Injectable 25 Gram(s) IV Push once  dextrose 50% Injectable 25 Gram(s) IV Push once  insulin lispro (HumaLOG) corrective regimen sliding scale   SubCutaneous every 6 hours  ipratropium 17 MICROgram(s) HFA Inhaler 2 Puff(s) Inhalation every 6 hours  levothyroxine 25 MICROGram(s) Oral daily  pantoprazole   Suspension 40 milliGRAM(s) Enteral Tube daily  tamsulosin 0.4 milliGRAM(s) Oral at bedtime    MEDICATIONS  (PRN):  acetaminophen    Suspension .. 650 milliGRAM(s) Oral every 6 hours PRN Temp greater or equal to 38C (100.4F), Mild Pain (1 - 3)      Pertinent Labs:  06-19 Na142 mmol/L Glu 80 mg/dL K+ 3.9 mmol/L Cr  1.89 mg/dL<H> BUN 35 mg/dL<H> 06-19 Phos 3.1 mg/dL 06-19 Alb 2.4 g/dL<L>        Skin: Unstageable coccyx Sacrum Stage II    Edema: (6/14) (+1) generalized edema     Last BM: on (6/16)    Estimated Needs:   [X] No Change since Previous Assessment      Previous Nutrition Diagnosis: Inadequate Nutrition Intake     Nutrition Diagnosis is [X] Ongoing       New Nutrition Diagnosis: [X] Dysphagia related to dementia as evidence by need for EN regimen, suggest initiate bolus feeds of Pivot 1.5 300cc 4 x day with 350cc QID       Interventions:   1. Recommend Pivot 1.5 bolus   2.  Suggest MVI  Vit C & Zinc due to noted skin breakdown .     Monitoring & Evaluation: will monitor:  [X] Weights   [X] Tolerance to EN feeds    [X] Follow Up (Per Protocol)        RD to follow as per Nutrition protocol  Maribel Savage RDN

## 2020-06-19 NOTE — PROGRESS NOTE ADULT - SUBJECTIVE AND OBJECTIVE BOX
Follow-up Critical Care Progress Note  Chief Complaint : COVID-19      off amio drip  hr stable  no cp, sob  pt more alert, still states does not feel well, but appears more comfortable than yesterday         Allergies :No Known Allergies      PAST MEDICAL & SURGICAL HISTORY:  GERD (gastroesophageal reflux disease)  Chronic kidney disease (CKD): Baseline creatinine 1.6  Type 2 diabetes mellitus  Heart failure, systolic and diastolic  Sciatica  Atrial fibrillation: Status post cardioversion  HTN (hypertension)  Neuropathy  Hypothyroid  COVID-19  Acute kidney failure  Artificial pacemaker      Medications:  MEDICATIONS  (STANDING):  ALBUTerol    90 MICROgram(s) HFA Inhaler 2 Puff(s) Inhalation every 6 hours  apixaban 5 milliGRAM(s) Oral every 12 hours  dextrose 5%. 1000 milliLiter(s) (50 mL/Hr) IV Continuous <Continuous>  dextrose 50% Injectable 12.5 Gram(s) IV Push once  dextrose 50% Injectable 25 Gram(s) IV Push once  dextrose 50% Injectable 25 Gram(s) IV Push once  insulin lispro (HumaLOG) corrective regimen sliding scale   SubCutaneous every 6 hours  ipratropium 17 MICROgram(s) HFA Inhaler 2 Puff(s) Inhalation every 6 hours  levothyroxine 25 MICROGram(s) Oral daily  pantoprazole   Suspension 40 milliGRAM(s) Enteral Tube daily  tamsulosin 0.4 milliGRAM(s) Oral at bedtime    MEDICATIONS  (PRN):  acetaminophen    Suspension .. 650 milliGRAM(s) Oral every 6 hours PRN Temp greater or equal to 38C (100.4F), Mild Pain (1 - 3)      LABS:                        9.7    12.72 )-----------( 259      ( 19 Jun 2020 05:12 )             32.3     06-19    142  |  101  |  35<H>  ----------------------------<  80  3.9   |  36<H>  |  1.89<H>    Ca    9.6      19 Jun 2020 05:12  Phos  3.1     06-19  Mg     1.6     06-19    TPro  6.8  /  Alb  2.4<L>  /  TBili  1.3<H>  /  DBili  x   /  AST  20  /  ALT  7<L>  /  AlkPhos  62  06-19  COVID  06-12-20 @ 08:12  COVID -   Detected<!!>      COVID Biomarkers    06-18-20 @ 05:27 ESR --  ---  CRP --  ---  DDimer  593<H>   ---      ---   Ferritin 712<H>    06-15-20 @ 05:30 ESR --  ---  CRP --  ---  DDimer  339<H>   ---   LDH --   ---   Ferritin 341    06-12-20 @ 08:00 ESR --  ---  CRP 2.99<H>  ---  DDimer  464<H>   ---   LDH --   ---   Ferritin 394        Trend Bun/Cr  06-19-20 @ 05:12  BUN/CR -  35<H> / 1.89<H>  06-18-20 @ 05:27  BUN/CR -  28<H> / 1.67<H>  06-17-20 @ 23:00  BUN/CR -  30<H> / 1.63<H>  06-17-20 @ 07:15  BUN/CR -  26<H> / 1.44<H>                    CULTURES: (if applicable)    Culture - Urine (collected 06-12-20 @ 08:50)  Source: .Urine Clean Catch (Midstream)  Final Report (06-13-20 @ 10:25):    No growth    Culture - Blood (collected 06-12-20 @ 08:00)  Source: .Blood Blood-Peripheral  Final Report (06-17-20 @ 12:00):    No Growth Final    Culture - Blood (collected 06-12-20 @ 08:00)  Source: .Blood Blood-Peripheral  Final Report (06-17-20 @ 12:00):    No Growth Final            CAPILLARY BLOOD GLUCOSE      POCT Blood Glucose.: 94 mg/dL (18 Jun 2020 22:26)      RADIOLOGY  CXR:  < from: Xray Chest 1 View- PORTABLE-Urgent (06.18.20 @ 15:53) >  IMPRESSION:   1. NG tube in stomach.  2. Interval improvement in pulmonary vascular congestive changes.    < end of copied text >        VITALS:  T(C): 36.1 (06-19-20 @ 08:00), Max: 37.4 (06-18-20 @ 12:00)  T(F): 97 (06-19-20 @ 08:00), Max: 99.4 (06-18-20 @ 12:00)  HR: 101 (06-19-20 @ 10:00) (76 - 101)  BP: 113/89 (06-19-20 @ 10:00) (89/79 - 127/82)  BP(mean): 97 (06-19-20 @ 10:00) (78 - 97)  ABP: --  ABP(mean): --  RR: 20 (06-19-20 @ 10:00) (16 - 29)  SpO2: 97% (06-19-20 @ 10:00) (90% - 100%)  CVP(mm Hg): --  CVP(cm H2O): --    Ins and Outs     06-18-20 @ 07:01  -  06-19-20 @ 07:00  --------------------------------------------------------  IN: 100.2 mL / OUT: 0 mL / NET: 100.2 mL        Height (cm): 170.2 (06-16-20 @ 16:43)  Weight (kg): 85.9 (06-16-20 @ 16:43)  BMI (kg/m2): 29.7 (06-16-20 @ 16:43)        I&O's Detail    18 Jun 2020 07:01  -  19 Jun 2020 07:00  --------------------------------------------------------  IN:    amiodarone Infusion: 100.2 mL  Total IN: 100.2 mL    OUT:  Total OUT: 0 mL    Total NET: 100.2 mL

## 2020-06-19 NOTE — PROGRESS NOTE ADULT - SUBJECTIVE AND OBJECTIVE BOX
Resting    Vital Signs Last 24 Hrs  T(C): 36.1 (06-19-20 @ 08:00), Max: 37.3 (06-18-20 @ 23:00)  T(F): 97 (06-19-20 @ 08:00), Max: 99.2 (06-18-20 @ 23:00)  HR: 101 (06-19-20 @ 10:00) (77 - 101)  BP: 113/89 (06-19-20 @ 10:00) (89/79 - 127/80)  BP(mean): 97 (06-19-20 @ 10:00) (78 - 97)  RR: 20 (06-19-20 @ 10:00) (16 - 29)  SpO2: 97% (06-19-20 @ 10:00) (90% - 100%)    s1s2  coarse BS  soft  sm edema                                                        9.7    12.72 )-----------( 259      ( 19 Jun 2020 05:12 )             32.3     19 Jun 2020 05:12    142    |  101    |  35     ----------------------------<  80     3.9     |  36     |  1.89     Ca    9.6        19 Jun 2020 05:12  Phos  3.1       19 Jun 2020 05:12  Mg     1.6       19 Jun 2020 05:12    TPro  6.8    /  Alb  2.4    /  TBili  1.3    /  DBili  x      /  AST  20     /  ALT  7      /  AlkPhos  62     19 Jun 2020 05:12    LIVER FUNCTIONS - ( 19 Jun 2020 05:12 )  Alb: 2.4 g/dL / Pro: 6.8 g/dL / ALK PHOS: 62 U/L / ALT: 7 U/L / AST: 20 U/L / GGT: x           acetaminophen    Suspension .. 650 milliGRAM(s) Oral every 6 hours PRN  ALBUTerol    90 MICROgram(s) HFA Inhaler 2 Puff(s) Inhalation every 6 hours  apixaban 5 milliGRAM(s) Oral every 12 hours  dextrose 5%. 1000 milliLiter(s) IV Continuous <Continuous>  dextrose 50% Injectable 12.5 Gram(s) IV Push once  dextrose 50% Injectable 25 Gram(s) IV Push once  dextrose 50% Injectable 25 Gram(s) IV Push once  insulin lispro (HumaLOG) corrective regimen sliding scale   SubCutaneous every 6 hours  ipratropium 17 MICROgram(s) HFA Inhaler 2 Puff(s) Inhalation every 6 hours  levothyroxine 25 MICROGram(s) Oral daily  magnesium sulfate  IVPB 1 Gram(s) IV Intermittent every 1 hour  metoprolol tartrate 25 milliGRAM(s) Oral two times a day  pantoprazole   Suspension 40 milliGRAM(s) Enteral Tube daily  tamsulosin 0.4 milliGRAM(s) Oral at bedtime    A/P:    Severe CM, EF 25-30%, mod TR/AS  Adm w/resp failure, CHF/COPD exacerbation, COVID +  S/p hemodynamic REY w/peak Cr 2.19 - 6/12   Improved w/marco Cr 1.19 - 6/16  Now w/rising Cr again, hemodynamic REY  Cardio-renal  Neely d/c-d, f/u PVR, avoid retention  On Flomax  Avoid nephrotoxins  Hold Lasix  Non-PTH hypercalcemia, etiology not clear  However Ca improved  SPEP negative  Will f/u PTHrP  Avoid hypotension  I/Os, daily weights  Prognosis is poor overall    [phone]

## 2020-06-20 LAB
ALBUMIN SERPL ELPH-MCNC: 2.3 G/DL — LOW (ref 3.3–5)
ALP SERPL-CCNC: 73 U/L — SIGNIFICANT CHANGE UP (ref 40–120)
ALT FLD-CCNC: 8 U/L — LOW (ref 10–45)
ANION GAP SERPL CALC-SCNC: 7 MMOL/L — SIGNIFICANT CHANGE UP (ref 5–17)
AST SERPL-CCNC: 20 U/L — SIGNIFICANT CHANGE UP (ref 10–40)
BASOPHILS # BLD AUTO: 0.03 K/UL — SIGNIFICANT CHANGE UP (ref 0–0.2)
BASOPHILS NFR BLD AUTO: 0.3 % — SIGNIFICANT CHANGE UP (ref 0–2)
BILIRUB SERPL-MCNC: 0.8 MG/DL — SIGNIFICANT CHANGE UP (ref 0.2–1.2)
BUN SERPL-MCNC: 42 MG/DL — HIGH (ref 7–23)
CALCIUM SERPL-MCNC: 9.4 MG/DL — SIGNIFICANT CHANGE UP (ref 8.4–10.5)
CHLORIDE SERPL-SCNC: 101 MMOL/L — SIGNIFICANT CHANGE UP (ref 96–108)
CO2 SERPL-SCNC: 36 MMOL/L — HIGH (ref 22–31)
CREAT SERPL-MCNC: 1.92 MG/DL — HIGH (ref 0.5–1.3)
EOSINOPHIL # BLD AUTO: 0.47 K/UL — SIGNIFICANT CHANGE UP (ref 0–0.5)
EOSINOPHIL NFR BLD AUTO: 4.2 % — SIGNIFICANT CHANGE UP (ref 0–6)
GLUCOSE BLDC GLUCOMTR-MCNC: 128 MG/DL — HIGH (ref 70–99)
GLUCOSE BLDC GLUCOMTR-MCNC: 131 MG/DL — HIGH (ref 70–99)
GLUCOSE BLDC GLUCOMTR-MCNC: 68 MG/DL — LOW (ref 70–99)
GLUCOSE BLDC GLUCOMTR-MCNC: 75 MG/DL — SIGNIFICANT CHANGE UP (ref 70–99)
GLUCOSE BLDC GLUCOMTR-MCNC: 88 MG/DL — SIGNIFICANT CHANGE UP (ref 70–99)
GLUCOSE BLDC GLUCOMTR-MCNC: 91 MG/DL — SIGNIFICANT CHANGE UP (ref 70–99)
GLUCOSE SERPL-MCNC: 148 MG/DL — HIGH (ref 70–99)
HCT VFR BLD CALC: 35.2 % — LOW (ref 39–50)
HGB BLD-MCNC: 10.3 G/DL — LOW (ref 13–17)
IMM GRANULOCYTES NFR BLD AUTO: 0.4 % — SIGNIFICANT CHANGE UP (ref 0–1.5)
LYMPHOCYTES # BLD AUTO: 1.39 K/UL — SIGNIFICANT CHANGE UP (ref 1–3.3)
LYMPHOCYTES # BLD AUTO: 12.4 % — LOW (ref 13–44)
MAGNESIUM SERPL-MCNC: 2.5 MG/DL — SIGNIFICANT CHANGE UP (ref 1.6–2.6)
MCHC RBC-ENTMCNC: 28.6 PG — SIGNIFICANT CHANGE UP (ref 27–34)
MCHC RBC-ENTMCNC: 29.3 GM/DL — LOW (ref 32–36)
MCV RBC AUTO: 97.8 FL — SIGNIFICANT CHANGE UP (ref 80–100)
MONOCYTES # BLD AUTO: 1.2 K/UL — HIGH (ref 0–0.9)
MONOCYTES NFR BLD AUTO: 10.7 % — SIGNIFICANT CHANGE UP (ref 2–14)
NEUTROPHILS # BLD AUTO: 8.07 K/UL — HIGH (ref 1.8–7.4)
NEUTROPHILS NFR BLD AUTO: 72 % — SIGNIFICANT CHANGE UP (ref 43–77)
NRBC # BLD: 0 /100 WBCS — SIGNIFICANT CHANGE UP (ref 0–0)
PHOSPHATE SERPL-MCNC: 2.6 MG/DL — SIGNIFICANT CHANGE UP (ref 2.5–4.5)
PLATELET # BLD AUTO: 276 K/UL — SIGNIFICANT CHANGE UP (ref 150–400)
POTASSIUM SERPL-MCNC: 3.7 MMOL/L — SIGNIFICANT CHANGE UP (ref 3.5–5.3)
POTASSIUM SERPL-SCNC: 3.7 MMOL/L — SIGNIFICANT CHANGE UP (ref 3.5–5.3)
PROT SERPL-MCNC: 7 G/DL — SIGNIFICANT CHANGE UP (ref 6–8.3)
RBC # BLD: 3.6 M/UL — LOW (ref 4.2–5.8)
RBC # FLD: 17.9 % — HIGH (ref 10.3–14.5)
SODIUM SERPL-SCNC: 144 MMOL/L — SIGNIFICANT CHANGE UP (ref 135–145)
WBC # BLD: 11.21 K/UL — HIGH (ref 3.8–10.5)
WBC # FLD AUTO: 11.21 K/UL — HIGH (ref 3.8–10.5)

## 2020-06-20 PROCEDURE — 99233 SBSQ HOSP IP/OBS HIGH 50: CPT

## 2020-06-20 RX ORDER — CEFEPIME 1 G/1
1000 INJECTION, POWDER, FOR SOLUTION INTRAMUSCULAR; INTRAVENOUS ONCE
Refills: 0 | Status: COMPLETED | OUTPATIENT
Start: 2020-06-20 | End: 2020-06-20

## 2020-06-20 RX ORDER — VANCOMYCIN HCL 1 G
1000 VIAL (EA) INTRAVENOUS ONCE
Refills: 0 | Status: COMPLETED | OUTPATIENT
Start: 2020-06-20 | End: 2020-06-20

## 2020-06-20 RX ADMIN — ALBUTEROL 2 PUFF(S): 90 AEROSOL, METERED ORAL at 09:10

## 2020-06-20 RX ADMIN — APIXABAN 5 MILLIGRAM(S): 2.5 TABLET, FILM COATED ORAL at 06:40

## 2020-06-20 RX ADMIN — APIXABAN 5 MILLIGRAM(S): 2.5 TABLET, FILM COATED ORAL at 16:38

## 2020-06-20 RX ADMIN — TAMSULOSIN HYDROCHLORIDE 0.4 MILLIGRAM(S): 0.4 CAPSULE ORAL at 22:26

## 2020-06-20 RX ADMIN — Medication 25 MILLIGRAM(S): at 16:39

## 2020-06-20 RX ADMIN — CEFEPIME 100 MILLIGRAM(S): 1 INJECTION, POWDER, FOR SOLUTION INTRAMUSCULAR; INTRAVENOUS at 10:27

## 2020-06-20 RX ADMIN — ALBUTEROL 2 PUFF(S): 90 AEROSOL, METERED ORAL at 05:11

## 2020-06-20 RX ADMIN — TAMSULOSIN HYDROCHLORIDE 0.4 MILLIGRAM(S): 0.4 CAPSULE ORAL at 00:22

## 2020-06-20 RX ADMIN — ALBUTEROL 2 PUFF(S): 90 AEROSOL, METERED ORAL at 22:09

## 2020-06-20 RX ADMIN — Medication 2 PUFF(S): at 05:11

## 2020-06-20 RX ADMIN — ALBUTEROL 2 PUFF(S): 90 AEROSOL, METERED ORAL at 15:55

## 2020-06-20 RX ADMIN — Medication 25 MILLIGRAM(S): at 06:41

## 2020-06-20 RX ADMIN — Medication 25 MICROGRAM(S): at 06:41

## 2020-06-20 RX ADMIN — Medication 2 PUFF(S): at 09:10

## 2020-06-20 RX ADMIN — Medication 2 PUFF(S): at 22:09

## 2020-06-20 RX ADMIN — Medication 2 PUFF(S): at 15:56

## 2020-06-20 RX ADMIN — Medication 250 MILLIGRAM(S): at 10:27

## 2020-06-20 NOTE — PROGRESS NOTE ADULT - ASSESSMENT
Physical Exam  Gen:  Not in distress, awake and following commands  Lung:  Bi lateral air entry, Scattered rales , no wheezing   CV:   RRR + murm  Abd:  Soft, NT/ND.  BS normoactive, no masses to palp  Neuro:  Alert and confused no gross motor/sensory deficits    Assessment:  - VF s/p AICD firing on 6/17  S/p  Amio drip  - Acute hypercapnic respiratory failure Resolved, Extubated 6/14, non smoker , 911 Exposure - on Registry   - Acute on chronic Heart failure with reduced EF  - REY on CKD stage 4 worsening  - Hypercalcemia   - Afib  - DM type 2  - Hypothyroidism - Improving  - + COVID19 infection  - Metabolic encephalopathy worsening     Plan  - BP stable, hold midodrine, Hold lasix as cr increasing, start BB, keep Mg >2.0 and K >4.0  - Cardio f/u to optimize meds  - D/w Dr. Layne, pt got round of abx, but low suspicion for pna, as no wbc, fever, cough, secretions, can be small effusion.   - today's mental status appears better, agree with oral diet, ngt removed by pt.   - n/c o2,  - Nephrology f/u for management of Ca  - Cont. Anticoagulation for afib with eliquis  - Fingerstick glucose monitoring with coverage for hyperglycemia  - diet as per S&S    - Palliatve care f/u pt now made DNR  - d/w Son Jose [phone] Confirmed DNR/I 6/19/20

## 2020-06-20 NOTE — PROGRESS NOTE ADULT - SUBJECTIVE AND OBJECTIVE BOX
HPI  Pt is a 76yo M admitted to [facility] for acute hypercapnic respiratory failure, acute on chronic HFrEF, VF s/p AICD firing.   Pt was seen and examined at bedside. Pt states is too early to tell how he feels. CXR showed new right lower lob pna. Hypoglycemic episode noted     Vital Signs Last 24 Hrs  T(C): 36.5 (20 Jun 2020 05:00), Max: 36.6 (19 Jun 2020 13:33)  T(F): 97.7 (20 Jun 2020 05:00), Max: 97.9 (19 Jun 2020 13:33)  HR: 79 (20 Jun 2020 05:00) (79 - 101)  BP: 115/78 (20 Jun 2020 05:00) (113/89 - 133/75)  BP(mean): 97 (19 Jun 2020 10:00) (97 - 97)  RR: 16 (20 Jun 2020 05:00) (16 - 20)  SpO2: 94% (20 Jun 2020 05:00) (92% - 100%)    I&O's Summary    19 Jun 2020 07:01  -  20 Jun 2020 07:00  --------------------------------------------------------  IN: 300 mL / OUT: 300 mL / NET: 0 mL    20 Jun 2020 07:01  -  20 Jun 2020 09:26  --------------------------------------------------------  IN: 300 mL / OUT: 0 mL / NET: 300 mL        CAPILLARY BLOOD GLUCOSE      POCT Blood Glucose.: 128 mg/dL (20 Jun 2020 06:37)  POCT Blood Glucose.: 88 mg/dL (20 Jun 2020 01:18)  POCT Blood Glucose.: 75 mg/dL (20 Jun 2020 00:49)  POCT Blood Glucose.: 68 mg/dL (20 Jun 2020 00:00)  POCT Blood Glucose.: 85 mg/dL (19 Jun 2020 16:54)  POCT Blood Glucose.: 81 mg/dL (19 Jun 2020 11:50)      PHYSICAL EXAM:    Constitutional: NAD, awake   HEENT: PERR,    Respiratory: Breath sounds are clear bilaterally,   Cardiovascular: S1 and S2,    Gastrointestinal: Bowel Sounds present, soft, NG tube noted   Extremities: No peripheral edema, bilateral mitten notted   Vascular: 2+ peripheral pulses  Neurological: A/O x 3,    Musculoskeletal: 4/5 strength b/l upper and lower extremities  Skin: stage 3 decubitus Sacral, stage 2 back.     MEDICATIONS:  MEDICATIONS  (STANDING):  ALBUTerol    90 MICROgram(s) HFA Inhaler 2 Puff(s) Inhalation every 6 hours  apixaban 5 milliGRAM(s) Oral every 12 hours  dextrose 5%. 1000 milliLiter(s) (50 mL/Hr) IV Continuous <Continuous>  dextrose 50% Injectable 12.5 Gram(s) IV Push once  dextrose 50% Injectable 25 Gram(s) IV Push once  dextrose 50% Injectable 25 Gram(s) IV Push once  insulin lispro (HumaLOG) corrective regimen sliding scale   SubCutaneous every 6 hours  ipratropium 17 MICROgram(s) HFA Inhaler 2 Puff(s) Inhalation every 6 hours  levothyroxine 25 MICROGram(s) Oral daily  metoprolol tartrate 25 milliGRAM(s) Oral two times a day  pantoprazole   Suspension 40 milliGRAM(s) Enteral Tube daily  tamsulosin 0.4 milliGRAM(s) Oral at bedtime      LABS: All Labs Reviewed:                        10.3   11.21 )-----------( 276      ( 20 Jun 2020 07:20 )             35.2     06-20    144  |  101  |  42<H>  ----------------------------<  148<H>  3.7   |  36<H>  |  1.92<H>    Ca    9.4      20 Jun 2020 07:20  Phos  2.6     06-20  Mg     2.5     06-20    TPro  7.0  /  Alb  2.3<L>  /  TBili  0.8  /  DBili  x   /  AST  20  /  ALT  8<L>  /  AlkPhos  73  06-20          Blood Culture:     RADIOLOGY/EKG:    DVT PPX:    ADVANCED DIRECTIVE:    DISPOSITION:

## 2020-06-20 NOTE — PROGRESS NOTE ADULT - SUBJECTIVE AND OBJECTIVE BOX
Resting    Vital Signs Last 24 Hrs  T(C): 36.5 (06-20-20 @ 05:00), Max: 36.5 (06-20-20 @ 05:00)  T(F): 97.7 (06-20-20 @ 05:00), Max: 97.7 (06-20-20 @ 05:00)  HR: 84 (06-20-20 @ 15:56) (79 - 91)  BP: 115/78 (06-20-20 @ 05:00) (115/78 - 133/75)  RR: 16 (06-20-20 @ 05:00) (16 - 16)  SpO2: 95% (06-20-20 @ 15:56) (92% - 95%)    s1s2  coarse BS  soft  sm edema                                                                10.3   11.21 )-----------( 276      ( 20 Jun 2020 07:20 )             35.2     20 Jun 2020 07:20    144    |  101    |  42     ----------------------------<  148    3.7     |  36     |  1.92     Ca    9.4        20 Jun 2020 07:20  Phos  2.6       20 Jun 2020 07:20  Mg     2.5       20 Jun 2020 07:20    TPro  7.0    /  Alb  2.3    /  TBili  0.8    /  DBili  x      /  AST  20     /  ALT  8      /  AlkPhos  73     20 Jun 2020 07:20    LIVER FUNCTIONS - ( 20 Jun 2020 07:20 )  Alb: 2.3 g/dL / Pro: 7.0 g/dL / ALK PHOS: 73 U/L / ALT: 8 U/L / AST: 20 U/L / GGT: x           acetaminophen    Suspension .. 650 milliGRAM(s) Oral every 6 hours PRN  ALBUTerol    90 MICROgram(s) HFA Inhaler 2 Puff(s) Inhalation every 6 hours  apixaban 5 milliGRAM(s) Oral every 12 hours  dextrose 5%. 1000 milliLiter(s) IV Continuous <Continuous>  dextrose 50% Injectable 12.5 Gram(s) IV Push once  dextrose 50% Injectable 25 Gram(s) IV Push once  dextrose 50% Injectable 25 Gram(s) IV Push once  insulin lispro (HumaLOG) corrective regimen sliding scale   SubCutaneous every 6 hours  ipratropium 17 MICROgram(s) HFA Inhaler 2 Puff(s) Inhalation every 6 hours  levothyroxine 25 MICROGram(s) Oral daily  metoprolol tartrate 25 milliGRAM(s) Oral two times a day  pantoprazole   Suspension 40 milliGRAM(s) Enteral Tube daily  tamsulosin 0.4 milliGRAM(s) Oral at bedtime    A/P:    Severe CM, EF 25-30%, mod TR/AS  Adm w/resp failure, CHF/COPD exacerbation, COVID +  S/p hemodynamic REY w/peak Cr 2.19 - 6/12   Improved w/marco Cr 1.19 - 6/16  Now w/rising Cr again, hemodynamic/cardio-renal REY  Neely d/c-d, f/u PVR  Check renal SONO  On Flomax  Avoid nephrotoxins  Hold Lasix  I/Os, daily weights  Prognosis is poor overall    [phone]

## 2020-06-20 NOTE — PROGRESS NOTE ADULT - SUBJECTIVE AND OBJECTIVE BOX
Follow-up Critical Care Progress Note  Chief Complaint : COVID-19      pt alert, responsive  does not complain of sob  states he feels be      Allergies :No Known Allergies      PAST MEDICAL & SURGICAL HISTORY:  GERD (gastroesophageal reflux disease)  Chronic kidney disease (CKD): Baseline creatinine 1.6  Type 2 diabetes mellitus  Heart failure, systolic and diastolic  Sciatica  Atrial fibrillation: Status post cardioversion  HTN (hypertension)  Neuropathy  Hypothyroid  COVID-19  Acute kidney failure  Artificial pacemaker      Medications:  MEDICATIONS  (STANDING):  ALBUTerol    90 MICROgram(s) HFA Inhaler 2 Puff(s) Inhalation every 6 hours  apixaban 5 milliGRAM(s) Oral every 12 hours  dextrose 5%. 1000 milliLiter(s) (50 mL/Hr) IV Continuous <Continuous>  dextrose 50% Injectable 12.5 Gram(s) IV Push once  dextrose 50% Injectable 25 Gram(s) IV Push once  dextrose 50% Injectable 25 Gram(s) IV Push once  insulin lispro (HumaLOG) corrective regimen sliding scale   SubCutaneous every 6 hours  ipratropium 17 MICROgram(s) HFA Inhaler 2 Puff(s) Inhalation every 6 hours  levothyroxine 25 MICROGram(s) Oral daily  metoprolol tartrate 25 milliGRAM(s) Oral two times a day  pantoprazole   Suspension 40 milliGRAM(s) Enteral Tube daily  tamsulosin 0.4 milliGRAM(s) Oral at bedtime    MEDICATIONS  (PRN):  acetaminophen    Suspension .. 650 milliGRAM(s) Oral every 6 hours PRN Temp greater or equal to 38C (100.4F), Mild Pain (1 - 3)      LABS:                        10.3   11.21 )-----------( 276      ( 20 Jun 2020 07:20 )             35.2     06-20    144  |  101  |  42<H>  ----------------------------<  148<H>  3.7   |  36<H>  |  1.92<H>    Ca    9.4      20 Jun 2020 07:20  Phos  2.6     06-20  Mg     2.5     06-20    TPro  7.0  /  Alb  2.3<L>  /  TBili  0.8  /  DBili  x   /  AST  20  /  ALT  8<L>  /  AlkPhos  73  06-20    HIT ab -- 06-18 @ 05:27  HIT ab EIA --  D Dimer -593  HIT ab -- 06-15 @ 05:30  HIT ab EIA --  D Dimer -339                      CULTURES: (if applicable)    Culture - Urine (collected 06-12-20 @ 08:50)  Source: .Urine Clean Catch (Midstream)  Final Report (06-13-20 @ 10:25):    No growth    Culture - Blood (collected 06-12-20 @ 08:00)  Source: .Blood Blood-Peripheral  Final Report (06-17-20 @ 12:00):    No Growth Final    Culture - Blood (collected 06-12-20 @ 08:00)  Source: .Blood Blood-Peripheral  Final Report (06-17-20 @ 12:00):    No Growth Final            CAPILLARY BLOOD GLUCOSE      POCT Blood Glucose.: 131 mg/dL (20 Jun 2020 13:08)      RADIOLOGY  CXR:      CT:    ECHO:      VITALS:  T(C): 36.5 (06-20-20 @ 05:00), Max: 36.6 (06-19-20 @ 15:34)  T(F): 97.7 (06-20-20 @ 05:00), Max: 97.9 (06-19-20 @ 15:34)  HR: 79 (06-20-20 @ 05:00) (79 - 84)  BP: 115/78 (06-20-20 @ 05:00) (115/78 - 133/75)  BP(mean): --  ABP: --  ABP(mean): --  RR: 16 (06-20-20 @ 05:00) (16 - 18)  SpO2: 94% (06-20-20 @ 05:00) (92% - 94%)  CVP(mm Hg): --  CVP(cm H2O): --    Ins and Outs     06-19-20 @ 07:01  -  06-20-20 @ 07:00  --------------------------------------------------------  IN: 300 mL / OUT: 300 mL / NET: 0 mL    06-20-20 @ 07:01  -  06-20-20 @ 13:43  --------------------------------------------------------  IN: 300 mL / OUT: 0 mL / NET: 300 mL                I&O's Detail    19 Jun 2020 07:01  -  20 Jun 2020 07:00  --------------------------------------------------------  IN:    Glucerna 1.5: 300 mL  Total IN: 300 mL    OUT:    Voided: 300 mL  Total OUT: 300 mL    Total NET: 0 mL      20 Jun 2020 07:01  -  20 Jun 2020 13:43  --------------------------------------------------------  IN:    Glucerna 1.5: 300 mL  Total IN: 300 mL    OUT:  Total OUT: 0 mL    Total NET: 300 mL Follow-up Critical Care Progress Note  Chief Complaint : COVID-19      pt alert, responsive  does not complain of sob  states he feels be      Allergies :No Known Allergies      PAST MEDICAL & SURGICAL HISTORY:  GERD (gastroesophageal reflux disease)  Chronic kidney disease (CKD): Baseline creatinine 1.6  Type 2 diabetes mellitus  Heart failure, systolic and diastolic  Sciatica  Atrial fibrillation: Status post cardioversion  HTN (hypertension)  Neuropathy  Hypothyroid  COVID-19  Acute kidney failure  Artificial pacemaker      Medications:  MEDICATIONS  (STANDING):  ALBUTerol    90 MICROgram(s) HFA Inhaler 2 Puff(s) Inhalation every 6 hours  apixaban 5 milliGRAM(s) Oral every 12 hours  dextrose 5%. 1000 milliLiter(s) (50 mL/Hr) IV Continuous <Continuous>  dextrose 50% Injectable 12.5 Gram(s) IV Push once  dextrose 50% Injectable 25 Gram(s) IV Push once  dextrose 50% Injectable 25 Gram(s) IV Push once  insulin lispro (HumaLOG) corrective regimen sliding scale   SubCutaneous every 6 hours  ipratropium 17 MICROgram(s) HFA Inhaler 2 Puff(s) Inhalation every 6 hours  levothyroxine 25 MICROGram(s) Oral daily  metoprolol tartrate 25 milliGRAM(s) Oral two times a day  pantoprazole   Suspension 40 milliGRAM(s) Enteral Tube daily  tamsulosin 0.4 milliGRAM(s) Oral at bedtime    MEDICATIONS  (PRN):  acetaminophen    Suspension .. 650 milliGRAM(s) Oral every 6 hours PRN Temp greater or equal to 38C (100.4F), Mild Pain (1 - 3)      LABS:                        10.3   11.21 )-----------( 276      ( 20 Jun 2020 07:20 )             35.2     06-20    144  |  101  |  42<H>  ----------------------------<  148<H>  3.7   |  36<H>  |  1.92<H>    Ca    9.4      20 Jun 2020 07:20  Phos  2.6     06-20  Mg     2.5     06-20    TPro  7.0  /  Alb  2.3<L>  /  TBili  0.8  /  DBili  x   /  AST  20  /  ALT  8<L>  /  AlkPhos  73  06-20    COVID  06-12-20 @ 08:12  COVID -   Detected<!!>      COVID Biomarkers    06-18-20 @ 05:27 ESR --  ---  CRP --  ---  DDimer  593<H>   ---      ---   Ferritin 712<H>    06-15-20 @ 05:30 ESR --  ---  CRP --  ---  DDimer  339<H>   ---   LDH --   ---   Ferritin 341    06-12-20 @ 08:00 ESR --  ---  CRP 2.99<H>  ---  DDimer  464<H>   ---   LDH --   ---   Ferritin 394      Trend Bun/Cr  06-20-20 @ 07:20  BUN/CR -  42<H> / 1.92<H>  06-19-20 @ 05:12  BUN/CR -  35<H> / 1.89<H>  06-18-20 @ 05:27  BUN/CR -  28<H> / 1.67<H>  06-17-20 @ 23:00  BUN/CR -  30<H> / 1.63<H>          CULTURES: (if applicable)    Culture - Urine (collected 06-12-20 @ 08:50)  Source: .Urine Clean Catch (Midstream)  Final Report (06-13-20 @ 10:25):    No growth    Culture - Blood (collected 06-12-20 @ 08:00)  Source: .Blood Blood-Peripheral  Final Report (06-17-20 @ 12:00):    No Growth Final    Culture - Blood (collected 06-12-20 @ 08:00)  Source: .Blood Blood-Peripheral  Final Report (06-17-20 @ 12:00):    No Growth Final            CAPILLARY BLOOD GLUCOSE      POCT Blood Glucose.: 131 mg/dL (20 Jun 2020 13:08)      RADIOLOGY  CXR:      CT:    ECHO:      VITALS:  T(C): 36.5 (06-20-20 @ 05:00), Max: 36.6 (06-19-20 @ 15:34)  T(F): 97.7 (06-20-20 @ 05:00), Max: 97.9 (06-19-20 @ 15:34)  HR: 79 (06-20-20 @ 05:00) (79 - 84)  BP: 115/78 (06-20-20 @ 05:00) (115/78 - 133/75)  BP(mean): --  ABP: --  ABP(mean): --  RR: 16 (06-20-20 @ 05:00) (16 - 18)  SpO2: 94% (06-20-20 @ 05:00) (92% - 94%)  CVP(mm Hg): --  CVP(cm H2O): --    Ins and Outs     06-19-20 @ 07:01  -  06-20-20 @ 07:00  --------------------------------------------------------  IN: 300 mL / OUT: 300 mL / NET: 0 mL    06-20-20 @ 07:01  -  06-20-20 @ 13:43  --------------------------------------------------------  IN: 300 mL / OUT: 0 mL / NET: 300 mL                I&O's Detail    19 Jun 2020 07:01  -  20 Jun 2020 07:00  --------------------------------------------------------  IN:    Glucerna 1.5: 300 mL  Total IN: 300 mL    OUT:    Voided: 300 mL  Total OUT: 300 mL    Total NET: 0 mL      20 Jun 2020 07:01  -  20 Jun 2020 13:43  --------------------------------------------------------  IN:    Glucerna 1.5: 300 mL  Total IN: 300 mL    OUT:  Total OUT: 0 mL    Total NET: 300 mL Follow-up Critical Care Progress Note  Chief Complaint : COVID-19      pt alert, responsive  does not complain of sob  states he feels better  pt pulled NGT    S&S advanced diet       Allergies :No Known Allergies      PAST MEDICAL & SURGICAL HISTORY:  GERD (gastroesophageal reflux disease)  Chronic kidney disease (CKD): Baseline creatinine 1.6  Type 2 diabetes mellitus  Heart failure, systolic and diastolic  Sciatica  Atrial fibrillation: Status post cardioversion  HTN (hypertension)  Neuropathy  Hypothyroid  COVID-19  Acute kidney failure  Artificial pacemaker      Medications:  MEDICATIONS  (STANDING):  ALBUTerol    90 MICROgram(s) HFA Inhaler 2 Puff(s) Inhalation every 6 hours  apixaban 5 milliGRAM(s) Oral every 12 hours  dextrose 5%. 1000 milliLiter(s) (50 mL/Hr) IV Continuous <Continuous>  dextrose 50% Injectable 12.5 Gram(s) IV Push once  dextrose 50% Injectable 25 Gram(s) IV Push once  dextrose 50% Injectable 25 Gram(s) IV Push once  insulin lispro (HumaLOG) corrective regimen sliding scale   SubCutaneous every 6 hours  ipratropium 17 MICROgram(s) HFA Inhaler 2 Puff(s) Inhalation every 6 hours  levothyroxine 25 MICROGram(s) Oral daily  metoprolol tartrate 25 milliGRAM(s) Oral two times a day  pantoprazole   Suspension 40 milliGRAM(s) Enteral Tube daily  tamsulosin 0.4 milliGRAM(s) Oral at bedtime    MEDICATIONS  (PRN):  acetaminophen    Suspension .. 650 milliGRAM(s) Oral every 6 hours PRN Temp greater or equal to 38C (100.4F), Mild Pain (1 - 3)      LABS:                        10.3   11.21 )-----------( 276      ( 20 Jun 2020 07:20 )             35.2     06-20    144  |  101  |  42<H>  ----------------------------<  148<H>  3.7   |  36<H>  |  1.92<H>    Ca    9.4      20 Jun 2020 07:20  Phos  2.6     06-20  Mg     2.5     06-20    TPro  7.0  /  Alb  2.3<L>  /  TBili  0.8  /  DBili  x   /  AST  20  /  ALT  8<L>  /  AlkPhos  73  06-20    COVID  06-12-20 @ 08:12  COVID -   Detected<!!>      COVID Biomarkers    06-18-20 @ 05:27 ESR --  ---  CRP --  ---  DDimer  593<H>   ---      ---   Ferritin 712<H>    06-15-20 @ 05:30 ESR --  ---  CRP --  ---  DDimer  339<H>   ---   LDH --   ---   Ferritin 341    06-12-20 @ 08:00 ESR --  ---  CRP 2.99<H>  ---  DDimer  464<H>   ---   LDH --   ---   Ferritin 394      Trend Bun/Cr  06-20-20 @ 07:20  BUN/CR -  42<H> / 1.92<H>  06-19-20 @ 05:12  BUN/CR -  35<H> / 1.89<H>  06-18-20 @ 05:27  BUN/CR -  28<H> / 1.67<H>  06-17-20 @ 23:00  BUN/CR -  30<H> / 1.63<H>          CULTURES: (if applicable)    Culture - Urine (collected 06-12-20 @ 08:50)  Source: .Urine Clean Catch (Midstream)  Final Report (06-13-20 @ 10:25):    No growth    Culture - Blood (collected 06-12-20 @ 08:00)  Source: .Blood Blood-Peripheral  Final Report (06-17-20 @ 12:00):    No Growth Final    Culture - Blood (collected 06-12-20 @ 08:00)  Source: .Blood Blood-Peripheral  Final Report (06-17-20 @ 12:00):    No Growth Final    < from: Xray Chest 1 View- PORTABLE-Urgent (06.19.20 @ 17:24) >  FINDINGS: NG tube tip within stomach.  The lungs  show ill-defined the LEFT lateral diaphragmatic contour which may indicate LEFT lower lobe retrocardiac airspace consolidation. A LEFT upper lobe and RIGHT lung parenchyma clear.    The  heart is enlarged in transverse diameter. No hilar mass. Trachea midline.  Biventricular cardiac device wire leads in place.   Visualized osseous structures are intact.        IMPRESSION:   NG tube tip within stomachas described. Suspect LEFT lower lobe cardiac airspace consolidation..    < end of copied text >  I suspect CHF      VITALS:  T(C): 36.5 (06-20-20 @ 05:00), Max: 36.6 (06-19-20 @ 15:34)  T(F): 97.7 (06-20-20 @ 05:00), Max: 97.9 (06-19-20 @ 15:34)  HR: 79 (06-20-20 @ 05:00) (79 - 84)  BP: 115/78 (06-20-20 @ 05:00) (115/78 - 133/75)  BP(mean): --  ABP: --  ABP(mean): --  RR: 16 (06-20-20 @ 05:00) (16 - 18)  SpO2: 94% (06-20-20 @ 05:00) (92% - 94%)  CVP(mm Hg): --  CVP(cm H2O): --    Ins and Outs     06-19-20 @ 07:01  -  06-20-20 @ 07:00  --------------------------------------------------------  IN: 300 mL / OUT: 300 mL / NET: 0 mL    06-20-20 @ 07:01  -  06-20-20 @ 13:43  --------------------------------------------------------  IN: 300 mL / OUT: 0 mL / NET: 300 mL                I&O's Detail    19 Jun 2020 07:01  -  20 Jun 2020 07:00  --------------------------------------------------------  IN:    Glucerna 1.5: 300 mL  Total IN: 300 mL    OUT:    Voided: 300 mL  Total OUT: 300 mL    Total NET: 0 mL      20 Jun 2020 07:01  -  20 Jun 2020 13:43  --------------------------------------------------------  IN:    Glucerna 1.5: 300 mL  Total IN: 300 mL    OUT:  Total OUT: 0 mL    Total NET: 300 mL

## 2020-06-20 NOTE — PROVIDER CONTACT NOTE (OTHER) - SITUATION
pt noted to resting comfortably in bed not pulling at any lines and no complaints over night. NGT tube noted to be FDC out midmorning. Dr. Slater made aware. Speech and swallow came to assess pt.

## 2020-06-21 LAB
ALBUMIN SERPL ELPH-MCNC: 2.2 G/DL — LOW (ref 3.3–5)
ALP SERPL-CCNC: 65 U/L — SIGNIFICANT CHANGE UP (ref 40–120)
ALT FLD-CCNC: 10 U/L — SIGNIFICANT CHANGE UP (ref 10–45)
ANION GAP SERPL CALC-SCNC: 6 MMOL/L — SIGNIFICANT CHANGE UP (ref 5–17)
AST SERPL-CCNC: 18 U/L — SIGNIFICANT CHANGE UP (ref 10–40)
BASOPHILS # BLD AUTO: 0.04 K/UL — SIGNIFICANT CHANGE UP (ref 0–0.2)
BASOPHILS NFR BLD AUTO: 0.4 % — SIGNIFICANT CHANGE UP (ref 0–2)
BILIRUB SERPL-MCNC: 1 MG/DL — SIGNIFICANT CHANGE UP (ref 0.2–1.2)
BUN SERPL-MCNC: 45 MG/DL — HIGH (ref 7–23)
CALCIUM SERPL-MCNC: 9.4 MG/DL — SIGNIFICANT CHANGE UP (ref 8.4–10.5)
CHLORIDE SERPL-SCNC: 102 MMOL/L — SIGNIFICANT CHANGE UP (ref 96–108)
CO2 SERPL-SCNC: 37 MMOL/L — HIGH (ref 22–31)
CREAT SERPL-MCNC: 1.78 MG/DL — HIGH (ref 0.5–1.3)
D DIMER BLD IA.RAPID-MCNC: 370 NG/ML DDU — HIGH
EOSINOPHIL # BLD AUTO: 0.35 K/UL — SIGNIFICANT CHANGE UP (ref 0–0.5)
EOSINOPHIL NFR BLD AUTO: 3.7 % — SIGNIFICANT CHANGE UP (ref 0–6)
FERRITIN SERPL-MCNC: 668 NG/ML — HIGH (ref 30–400)
GLUCOSE BLDC GLUCOMTR-MCNC: 76 MG/DL — SIGNIFICANT CHANGE UP (ref 70–99)
GLUCOSE BLDC GLUCOMTR-MCNC: 83 MG/DL — SIGNIFICANT CHANGE UP (ref 70–99)
GLUCOSE BLDC GLUCOMTR-MCNC: 85 MG/DL — SIGNIFICANT CHANGE UP (ref 70–99)
GLUCOSE BLDC GLUCOMTR-MCNC: 85 MG/DL — SIGNIFICANT CHANGE UP (ref 70–99)
GLUCOSE BLDC GLUCOMTR-MCNC: 97 MG/DL — SIGNIFICANT CHANGE UP (ref 70–99)
GLUCOSE SERPL-MCNC: 86 MG/DL — SIGNIFICANT CHANGE UP (ref 70–99)
HCT VFR BLD CALC: 34.7 % — LOW (ref 39–50)
HGB BLD-MCNC: 10.4 G/DL — LOW (ref 13–17)
IMM GRANULOCYTES NFR BLD AUTO: 0.4 % — SIGNIFICANT CHANGE UP (ref 0–1.5)
LDH SERPL L TO P-CCNC: 235 U/L — SIGNIFICANT CHANGE UP (ref 50–242)
LYMPHOCYTES # BLD AUTO: 1.39 K/UL — SIGNIFICANT CHANGE UP (ref 1–3.3)
LYMPHOCYTES # BLD AUTO: 14.9 % — SIGNIFICANT CHANGE UP (ref 13–44)
MAGNESIUM SERPL-MCNC: 2.3 MG/DL — SIGNIFICANT CHANGE UP (ref 1.6–2.6)
MCHC RBC-ENTMCNC: 28.7 PG — SIGNIFICANT CHANGE UP (ref 27–34)
MCHC RBC-ENTMCNC: 30 GM/DL — LOW (ref 32–36)
MCV RBC AUTO: 95.9 FL — SIGNIFICANT CHANGE UP (ref 80–100)
MONOCYTES # BLD AUTO: 0.88 K/UL — SIGNIFICANT CHANGE UP (ref 0–0.9)
MONOCYTES NFR BLD AUTO: 9.4 % — SIGNIFICANT CHANGE UP (ref 2–14)
NEUTROPHILS # BLD AUTO: 6.64 K/UL — SIGNIFICANT CHANGE UP (ref 1.8–7.4)
NEUTROPHILS NFR BLD AUTO: 71.2 % — SIGNIFICANT CHANGE UP (ref 43–77)
NRBC # BLD: 0 /100 WBCS — SIGNIFICANT CHANGE UP (ref 0–0)
PHOSPHATE SERPL-MCNC: 1.3 MG/DL — LOW (ref 2.5–4.5)
PLATELET # BLD AUTO: 278 K/UL — SIGNIFICANT CHANGE UP (ref 150–400)
POTASSIUM SERPL-MCNC: 3.6 MMOL/L — SIGNIFICANT CHANGE UP (ref 3.5–5.3)
POTASSIUM SERPL-SCNC: 3.6 MMOL/L — SIGNIFICANT CHANGE UP (ref 3.5–5.3)
PROT SERPL-MCNC: 6.9 G/DL — SIGNIFICANT CHANGE UP (ref 6–8.3)
PTH RELATED PROT SERPL-MCNC: <2 PMOL/L — SIGNIFICANT CHANGE UP
RBC # BLD: 3.62 M/UL — LOW (ref 4.2–5.8)
RBC # FLD: 17.2 % — HIGH (ref 10.3–14.5)
SARS-COV-2 RNA SPEC QL NAA+PROBE: DETECTED
SODIUM SERPL-SCNC: 145 MMOL/L — SIGNIFICANT CHANGE UP (ref 135–145)
WBC # BLD: 9.34 K/UL — SIGNIFICANT CHANGE UP (ref 3.8–10.5)
WBC # FLD AUTO: 9.34 K/UL — SIGNIFICANT CHANGE UP (ref 3.8–10.5)

## 2020-06-21 PROCEDURE — 93010 ELECTROCARDIOGRAM REPORT: CPT

## 2020-06-21 PROCEDURE — 76775 US EXAM ABDO BACK WALL LIM: CPT | Mod: 26

## 2020-06-21 PROCEDURE — 99233 SBSQ HOSP IP/OBS HIGH 50: CPT

## 2020-06-21 PROCEDURE — 71045 X-RAY EXAM CHEST 1 VIEW: CPT | Mod: 26

## 2020-06-21 RX ORDER — POTASSIUM CHLORIDE 20 MEQ
40 PACKET (EA) ORAL ONCE
Refills: 0 | Status: COMPLETED | OUTPATIENT
Start: 2020-06-21 | End: 2020-06-21

## 2020-06-21 RX ORDER — SODIUM,POTASSIUM PHOSPHATES 278-250MG
1 POWDER IN PACKET (EA) ORAL ONCE
Refills: 0 | Status: COMPLETED | OUTPATIENT
Start: 2020-06-21 | End: 2020-06-21

## 2020-06-21 RX ADMIN — Medication 2 PUFF(S): at 22:00

## 2020-06-21 RX ADMIN — Medication 2 PUFF(S): at 16:21

## 2020-06-21 RX ADMIN — ALBUTEROL 2 PUFF(S): 90 AEROSOL, METERED ORAL at 09:11

## 2020-06-21 RX ADMIN — APIXABAN 5 MILLIGRAM(S): 2.5 TABLET, FILM COATED ORAL at 17:55

## 2020-06-21 RX ADMIN — TAMSULOSIN HYDROCHLORIDE 0.4 MILLIGRAM(S): 0.4 CAPSULE ORAL at 23:08

## 2020-06-21 RX ADMIN — Medication 40 MILLIEQUIVALENT(S): at 16:04

## 2020-06-21 RX ADMIN — PANTOPRAZOLE SODIUM 40 MILLIGRAM(S): 20 TABLET, DELAYED RELEASE ORAL at 12:18

## 2020-06-21 RX ADMIN — Medication 1 PACKET(S): at 09:24

## 2020-06-21 RX ADMIN — Medication 2 PUFF(S): at 09:11

## 2020-06-21 RX ADMIN — Medication 25 MICROGRAM(S): at 06:18

## 2020-06-21 RX ADMIN — Medication 2 PUFF(S): at 04:10

## 2020-06-21 RX ADMIN — ALBUTEROL 2 PUFF(S): 90 AEROSOL, METERED ORAL at 22:00

## 2020-06-21 RX ADMIN — APIXABAN 5 MILLIGRAM(S): 2.5 TABLET, FILM COATED ORAL at 06:18

## 2020-06-21 RX ADMIN — Medication 25 MILLIGRAM(S): at 17:55

## 2020-06-21 RX ADMIN — ALBUTEROL 2 PUFF(S): 90 AEROSOL, METERED ORAL at 16:21

## 2020-06-21 RX ADMIN — ALBUTEROL 2 PUFF(S): 90 AEROSOL, METERED ORAL at 04:10

## 2020-06-21 NOTE — PROGRESS NOTE ADULT - SUBJECTIVE AND OBJECTIVE BOX
Resting    Vital Signs Last 24 Hrs  T(C): 36.4 (06-21-20 @ 05:58), Max: 37.1 (06-20-20 @ 17:13)  T(F): 97.5 (06-21-20 @ 05:58), Max: 98.8 (06-20-20 @ 17:13)  HR: 74 (06-21-20 @ 10:17) (74 - 91)  BP: 100/60 (06-21-20 @ 05:58) (93/67 - 114/67)  RR: 17 (06-21-20 @ 05:58) (16 - 18)  SpO2: 97% (06-21-20 @ 10:17) (95% - 98%)    s1s2  coarse BS  soft  sm edema                                                                         10.4   9.34  )-----------( 278      ( 21 Jun 2020 06:33 )             34.7     21 Jun 2020 06:33    145    |  102    |  45     ----------------------------<  86     3.6     |  37     |  1.78     Ca    9.4        21 Jun 2020 06:33  Phos  1.3       21 Jun 2020 06:33  Mg     2.3       21 Jun 2020 06:33    TPro  6.9    /  Alb  2.2    /  TBili  1.0    /  DBili  x      /  AST  18     /  ALT  10     /  AlkPhos  65     21 Jun 2020 06:33    LIVER FUNCTIONS - ( 21 Jun 2020 06:33 )  Alb: 2.2 g/dL / Pro: 6.9 g/dL / ALK PHOS: 65 U/L / ALT: 10 U/L / AST: 18 U/L / GGT: x           acetaminophen    Suspension .. 650 milliGRAM(s) Oral every 6 hours PRN  ALBUTerol    90 MICROgram(s) HFA Inhaler 2 Puff(s) Inhalation every 6 hours  apixaban 5 milliGRAM(s) Oral every 12 hours  dextrose 5%. 1000 milliLiter(s) IV Continuous <Continuous>  dextrose 50% Injectable 12.5 Gram(s) IV Push once  dextrose 50% Injectable 25 Gram(s) IV Push once  dextrose 50% Injectable 25 Gram(s) IV Push once  insulin lispro (HumaLOG) corrective regimen sliding scale   SubCutaneous every 6 hours  ipratropium 17 MICROgram(s) HFA Inhaler 2 Puff(s) Inhalation every 6 hours  levothyroxine 25 MICROGram(s) Oral daily  metoprolol tartrate 25 milliGRAM(s) Oral two times a day  pantoprazole   Suspension 40 milliGRAM(s) Enteral Tube daily  potassium chloride   Powder 40 milliEquivalent(s) Oral once  tamsulosin 0.4 milliGRAM(s) Oral at bedtime    A/P:    Severe CM, EF 25-30%, mod TR/AS  Adm w/resp failure, CHF/COPD exacerbation, COVID +  S/p hemodynamic REY w/peak Cr 2.19 - 6/12   Improved w/marco Cr 1.19 - 6/16  S/p rising Cr again, hemodynamic/cardio-renal REY  Renal SONO w/o hydro  On Flomax  Avoid nephrotoxins  Hold Lasix  Ct is better today  I/Os, daily weights  BMP in am    [phone]

## 2020-06-21 NOTE — PROGRESS NOTE ADULT - ASSESSMENT
Pt is a 76yo M admitted to [facility] for acute hypercapnic respiratory failure, acute on chronic HFrEF, VF s/p AICD firing.     *VF s/p AICD firing  s/p Amnio trip   over night V-paced rate between 70-80s  Cont Lopressor   Cardio consult appreciated     *pna  CXR LLL consolidation   will start Vanco and cefepime   ID consult     *Afib  rate controlled  Cont lopressor   Cont Eliquis     *Covid 19 infection   s/p treatment     *Hypercapnic respiratory failure   resolved   s/p extubation 6/14     *Acute on chronic HFrEF   EF 25-30%   Cont BB   Lasix on hold due to worsening REY     *REY on CKD   nephrology consult appreciated  Lasix Hold for now     *Hypercalcemia   resolved   unknown reason     *DM   Hypoglycemia noted     *NGT feeding   pt pulled out NGT 6/20   Speech and swallow advanced pt to PO, will pending noted to advance diet     *Hypothyroidism   improving 75 M acute hypercapnic respiratory failure, acute on chronic HFrEF VF s/p AICD firing, CKD S3-4    Acute hypoxic respiratory failure secondary to COVID-19.   Resolved       *VF s/p AICD firing  s/p Amnio trip   over night V-paced rate between 70-80s  Cont Lopressor   Cardio consult appreciated     *pna  CXR LLL consolidation   will start Vanco and cefepime   ID consult     *Afib  rate controlled  Cont lopressor   Cont Eliquis     *Covid 19 infection   s/p treatment     *Hypercapnic respiratory failure   resolved   s/p extubation 6/14     *Acute on chronic HFrEF   EF 25-30%   Cont BB   Lasix on hold due to worsening REY     *REY on CKD   nephrology consult appreciated  Lasix Hold for now     *Hypercalcemia   resolved   unknown reason     *DM   Hypoglycemia noted     *NGT feeding   pt pulled out NGT 6/20   Speech and swallow advanced pt to PO, will pending noted to advance diet     *Hypothyroidism   improving 75 M acute hypercapnic respiratory failure, acute on chronic HFrEF VF s/p AICD firing, hypothyroidism, BPH CKD S3-4    Acute hypoxic respiratory failure secondary to COVID-19.   Hypercapnic respiratory failure   s/p extubation 6/14  Resolved     recheck COVID-19 if negative will consider transferring to [facility] given the dysrhythmia     *VF s/p AICD firing, wide complex intermittent beats  cards team seen, will have team revisit,    keep K and mg 4 and 2 respectively     LLL ? opacity reviewed w pulm team and not likely PNA and will monitor off abx risk to benefit reviewed and analyzed   recheck CXR     *Afib rate controlled   Cont lopressor   Cont Eliquis   Keep K and Mg 4 and 2 respectively.   check TSH     *Acute on chronic HFrEF appears compensated   EF 25-30%   Cont BB   Lasix on hold bump up in Cr   consider restarting lasix   renal team following   io uo and regular weight checks     Nonoliguric REY on CKD improving   nephrology consult appreciated  Lasix Hold for now   io ou and regular weight checks.   avoid nephrotoxic agents     *Hypercalcemia     resolved      *IDDM   Hypoglycemia noted     *Hypothyroidism   improving   continue synthroid     VTE proph   DNR/I pulm and pal teams seen 75 M acute hypercapnic respiratory failure, acute on chronic HFrEF VF s/p AICD firing, hypothyroidism, BPH CKD S3-4    Acute hypoxic respiratory failure secondary to COVID-19.   Hypercapnic respiratory failure   s/p extubation 6/14  Resolved     recheck COVID-19 if negative will consider transferring to [facility] given the dysrhythmia     *VF s/p AICD firing, wide complex intermittent beats  cards team seen, will have team revisit,    keep K and mg 4 and 2 respectively     LLL ? opacity reviewed w pulm team and not likely PNA and will monitor off abx risk to benefit reviewed and analyzed   recheck CXR reviewed, no acute opacity noted. reviewed w pulm team and monitor off abx     *Afib rate controlled   Cont lopressor   Cont Eliquis   Keep K and Mg 4 and 2 respectively.   check TSH     *Acute on chronic HFrEF appears compensated   EF 25-30%   Cont BB   Lasix on hold bump up in Cr   consider restarting lasix   renal team following   io uo and regular weight checks     Nonoliguric REY on CKD improving   nephrology consult appreciated  Lasix Hold for now   io ou and regular weight checks.   avoid nephrotoxic agents     *Hypercalcemia     resolved      *IDDM   Hypoglycemia noted     *Hypothyroidism   improving   continue synthroid     VTE proph   DNR/I pulm and pal teams seen 75 M acute hypercapnic respiratory failure, acute on chronic HFrEF VF s/p AICD firing, hypothyroidism, BPH CKD S3-4    Acute hypoxic respiratory failure secondary to COVID-19.   Hypercapnic respiratory failure   s/p extubation 6/14  Resolved     recheck COVID-19 if negative will consider transferring to [facility] given the dysrhythmia     *VF s/p AICD firing, wide complex intermittent beats  cards team seen, will have team revisit,    keep K and mg 4 and 2 respectively   device check in am     LLL ? opacity reviewed w pulm team and not likely PNA and will monitor off abx risk to benefit reviewed and analyzed   recheck CXR reviewed, no acute opacity noted. reviewed w pulm team and monitor off abx     *Afib rate controlled   Cont lopressor   Cont Eliquis   Keep K and Mg 4 and 2 respectively.   check TSH     *Acute on chronic HFrEF appears compensated   EF 25-30%   Cont BB   Lasix on hold bump up in Cr   consider restarting lasix   renal team following   io uo and regular weight checks     Nonoliguric REY on CKD improving   nephrology consult appreciated  Lasix Hold for now   io ou and regular weight checks.   avoid nephrotoxic agents     *Hypercalcemia     resolved      *IDDM   Hypoglycemia noted     *Hypothyroidism   improving   continue synthroid     VTE proph   DNR/I pulm and pal teams seen     reviewed w wife Tari plan of care over the phone, called son and went to , busy signal

## 2020-06-21 NOTE — PROGRESS NOTE ADULT - SUBJECTIVE AND OBJECTIVE BOX
HPI  Pt is a 76yo M admitted to [facility] for acute hypercapnic respiratory failure, acute on chronic HFrEF, VF s/p AICD firing.   Pt was seen and examined at bedside. Pt states is too early to tell how he feels. CXR showed new right lower lob pna. Hypoglycemic episode noted     Vital Signs Last 24 Hrs  T(C): 36.5 (20 Jun 2020 05:00), Max: 36.6 (19 Jun 2020 13:33)  T(F): 97.7 (20 Jun 2020 05:00), Max: 97.9 (19 Jun 2020 13:33)  HR: 79 (20 Jun 2020 05:00) (79 - 101)  BP: 115/78 (20 Jun 2020 05:00) (113/89 - 133/75)  BP(mean): 97 (19 Jun 2020 10:00) (97 - 97)  RR: 16 (20 Jun 2020 05:00) (16 - 20)  SpO2: 94% (20 Jun 2020 05:00) (92% - 100%)    I&O's Summary    19 Jun 2020 07:01  -  20 Jun 2020 07:00  --------------------------------------------------------  IN: 300 mL / OUT: 300 mL / NET: 0 mL    20 Jun 2020 07:01  -  20 Jun 2020 09:26  --------------------------------------------------------  IN: 300 mL / OUT: 0 mL / NET: 300 mL        CAPILLARY BLOOD GLUCOSE      POCT Blood Glucose.: 128 mg/dL (20 Jun 2020 06:37)  POCT Blood Glucose.: 88 mg/dL (20 Jun 2020 01:18)  POCT Blood Glucose.: 75 mg/dL (20 Jun 2020 00:49)  POCT Blood Glucose.: 68 mg/dL (20 Jun 2020 00:00)  POCT Blood Glucose.: 85 mg/dL (19 Jun 2020 16:54)  POCT Blood Glucose.: 81 mg/dL (19 Jun 2020 11:50)      PHYSICAL EXAM:    Constitutional: NAD, awake   HEENT: PERR,    Respiratory: Breath sounds are clear bilaterally,   Cardiovascular: S1 and S2,    Gastrointestinal: Bowel Sounds present, soft, NG tube noted   Extremities: No peripheral edema, bilateral mitten notted   Vascular: 2+ peripheral pulses  Neurological: A/O x 3,    Musculoskeletal: 4/5 strength b/l upper and lower extremities  Skin: stage 3 decubitus Sacral, stage 2 back.     MEDICATIONS:  MEDICATIONS  (STANDING):  ALBUTerol    90 MICROgram(s) HFA Inhaler 2 Puff(s) Inhalation every 6 hours  apixaban 5 milliGRAM(s) Oral every 12 hours  dextrose 5%. 1000 milliLiter(s) (50 mL/Hr) IV Continuous <Continuous>  dextrose 50% Injectable 12.5 Gram(s) IV Push once  dextrose 50% Injectable 25 Gram(s) IV Push once  dextrose 50% Injectable 25 Gram(s) IV Push once  insulin lispro (HumaLOG) corrective regimen sliding scale   SubCutaneous every 6 hours  ipratropium 17 MICROgram(s) HFA Inhaler 2 Puff(s) Inhalation every 6 hours  levothyroxine 25 MICROGram(s) Oral daily  metoprolol tartrate 25 milliGRAM(s) Oral two times a day  pantoprazole   Suspension 40 milliGRAM(s) Enteral Tube daily  tamsulosin 0.4 milliGRAM(s) Oral at bedtime      LABS: All Labs Reviewed:                        10.3   11.21 )-----------( 276      ( 20 Jun 2020 07:20 )             35.2     06-20    144  |  101  |  42<H>  ----------------------------<  148<H>  3.7   |  36<H>  |  1.92<H>    Ca    9.4      20 Jun 2020 07:20  Phos  2.6     06-20  Mg     2.5     06-20    TPro  7.0  /  Alb  2.3<L>  /  TBili  0.8  /  DBili  x   /  AST  20  /  ALT  8<L>  /  AlkPhos  73  06-20          Blood Culture:     RADIOLOGY/EKG:    DVT PPX:    ADVANCED DIRECTIVE:    DISPOSITION: HPI 75 M hx Acute hypercapnic respiratory failure COVID-19, acute on chronic SHF EF 45% VT AICD, CKD S3, IDDM, Essential HTN fu     Vital Signs Last 24 Hrs    T(C): 36.4 (21 Jun 2020 05:58), Max: 37.1 (20 Jun 2020 17:13)  T(F): 97.5 (21 Jun 2020 05:58), Max: 98.8 (20 Jun 2020 17:13)  HR: 79 (21 Jun 2020 05:58) (76 - 91)  BP: 100/60 (21 Jun 2020 05:58) (93/67 - 114/67)  BP(mean): --  RR: 17 (21 Jun 2020 05:58) (16 - 18)  SpO2: 95% (21 Jun 2020 05:58) (95% - 98%)               10.4   9.34  )-----------( 278      ( 21 Jun 2020 06:33 )             34.7   06-21    145  |  102  |  45<H>  ----------------------------<  86  3.6   |  37<H>  |  1.78<H>    Ca    9.4      21 Jun 2020 06:33  Phos  1.3     06-21  Mg     2.3     06-21    TPro  6.9  /  Alb  2.2<L>  /  TBili  1.0  /  DBili  x   /  AST  18  /  ALT  10  /  AlkPhos  65  06-21    CAPILLARY BLOOD GLUCOSE    POCT Blood Glucose.: 128 mg/dL (20 Jun 2020 06:37)  POCT Blood Glucose.: 88 mg/dL (20 Jun 2020 01:18)  POCT Blood Glucose.: 75 mg/dL (20 Jun 2020 00:49)  POCT Blood Glucose.: 68 mg/dL (20 Jun 2020 00:00)  POCT Blood Glucose.: 85 mg/dL (19 Jun 2020 16:54)  POCT Blood Glucose.: 81 mg/dL (19 Jun 2020 11:50)    MEDICATIONS  (STANDING):    ALBUTerol    90 MICROgram(s) HFA Inhaler 2 Puff(s) Inhalation every 6 hours  apixaban 5 milliGRAM(s) Oral every 12 hours  dextrose 5%. 1000 milliLiter(s) (50 mL/Hr) IV Continuous <Continuous>  dextrose 50% Injectable 12.5 Gram(s) IV Push once  dextrose 50% Injectable 25 Gram(s) IV Push once  dextrose 50% Injectable 25 Gram(s) IV Push once  insulin lispro (HumaLOG) corrective regimen sliding scale   SubCutaneous every 6 hours  ipratropium 17 MICROgram(s) HFA Inhaler 2 Puff(s) Inhalation every 6 hours  levothyroxine 25 MICROGram(s) Oral daily  metoprolol tartrate 25 milliGRAM(s) Oral two times a day  pantoprazole   Suspension 40 milliGRAM(s) Enteral Tube daily  potassium phosphate / sodium phosphate Powder (PHOS-NaK) 1 Packet(s) Oral once  tamsulosin 0.4 milliGRAM(s) Oral at bedtime    MEDICATIONS  (PRN):  acetaminophen    Suspension .. 650 milliGRAM(s) Oral every 6 hours PRN Temp greater or equal to 38C (100.4F), Mild Pain (1 - 3)      PHYSICAL EXAM:    Constitutional: NAD, awake   HEENT: PERRRA, no lo, neck supple     Respiratory: Breath sounds are clear bilaterally,   Cardiovascular: S1 and S2,  rrr s1-s2 no murmurs no edema le bi   Gastrointestinal: Bowel Sounds present, soft, NG tube noted   Extremities: No peripheral edema, bilateral mitten notted   Vascular: 2+ peripheral pulses  Neurological: A/O x 3    Musculoskeletal: 4/5 strength b/l upper and lower extremities  Skin: stage 3 decubitus Sacral, stage 2 back.     MEDICATIONS:  MEDICATIONS  (STANDING):  ALBUTerol    90 MICROgram(s) HFA Inhaler 2 Puff(s) Inhalation every 6 hours  apixaban 5 milliGRAM(s) Oral every 12 hours  dextrose 5%. 1000 milliLiter(s) (50 mL/Hr) IV Continuous <Continuous>  dextrose 50% Injectable 12.5 Gram(s) IV Push once  dextrose 50% Injectable 25 Gram(s) IV Push once  dextrose 50% Injectable 25 Gram(s) IV Push once  insulin lispro (HumaLOG) corrective regimen sliding scale   SubCutaneous every 6 hours  ipratropium 17 MICROgram(s) HFA Inhaler 2 Puff(s) Inhalation every 6 hours  levothyroxine 25 MICROGram(s) Oral daily  metoprolol tartrate 25 milliGRAM(s) Oral two times a day  pantoprazole   Suspension 40 milliGRAM(s) Enteral Tube daily  tamsulosin 0.4 milliGRAM(s) Oral at bedtime      LABS: All Labs Reviewed:                        10.3   11.21 )-----------( 276      ( 20 Jun 2020 07:20 )             35.2     06-20    144  |  101  |  42<H>  ----------------------------<  148<H>  3.7   |  36<H>  |  1.92<H>    Ca    9.4      20 Jun 2020 07:20  Phos  2.6     06-20  Mg     2.5     06-20    TPro  7.0  /  Alb  2.3<L>  /  TBili  0.8  /  DBili  x   /  AST  20  /  ALT  8<L>  /  AlkPhos  73  06-20          Blood Culture:     RADIOLOGY/EKG:    DVT PPX:    ADVANCED DIRECTIVE:    DISPOSITION: HPI 75 M hx Acute hypercapnic respiratory failure COVID-19, acute on chronic SHF EF 45% VT AICD, BPH, hypothyroidism CKD S3, IDDM, Essential HTN fu     Brief 3 beat wide complex this am, BP ok, seen at bedside no chest pain, afebrile, wbc 9, and no signs of aspiration per nursing team this am   12 lead EKG V paced, no acute ST changes    ros all others are neg       Vital Signs Last 24 Hrs    T(C): 36.4 (21 Jun 2020 05:58), Max: 37.1 (20 Jun 2020 17:13)  T(F): 97.5 (21 Jun 2020 05:58), Max: 98.8 (20 Jun 2020 17:13)  HR: 79 (21 Jun 2020 05:58) (76 - 91)  BP: 100/60 (21 Jun 2020 05:58) (93/67 - 114/67)  BP(mean): --  RR: 17 (21 Jun 2020 05:58) (16 - 18)  SpO2: 95% (21 Jun 2020 05:58) (95% - 98%)               10.4   9.34  )-----------( 278      ( 21 Jun 2020 06:33 )             34.7   06-21    145  |  102  |  45<H>  ----------------------------<  86  3.6   |  37<H>  |  1.78<H>    Ca    9.4      21 Jun 2020 06:33  Phos  1.3     06-21  Mg     2.3     06-21    TPro  6.9  /  Alb  2.2<L>  /  TBili  1.0  /  DBili  x   /  AST  18  /  ALT  10  /  AlkPhos  65  06-21    CAPILLARY BLOOD GLUCOSE    POCT Blood Glucose.: 128 mg/dL (20 Jun 2020 06:37)  POCT Blood Glucose.: 88 mg/dL (20 Jun 2020 01:18)  POCT Blood Glucose.: 75 mg/dL (20 Jun 2020 00:49)  POCT Blood Glucose.: 68 mg/dL (20 Jun 2020 00:00)  POCT Blood Glucose.: 85 mg/dL (19 Jun 2020 16:54)  POCT Blood Glucose.: 81 mg/dL (19 Jun 2020 11:50)    MEDICATIONS  (STANDING):    ALBUTerol    90 MICROgram(s) HFA Inhaler 2 Puff(s) Inhalation every 6 hours  apixaban 5 milliGRAM(s) Oral every 12 hours  dextrose 5%. 1000 milliLiter(s) (50 mL/Hr) IV Continuous <Continuous>  dextrose 50% Injectable 12.5 Gram(s) IV Push once  dextrose 50% Injectable 25 Gram(s) IV Push once  dextrose 50% Injectable 25 Gram(s) IV Push once  insulin lispro (HumaLOG) corrective regimen sliding scale   SubCutaneous every 6 hours  ipratropium 17 MICROgram(s) HFA Inhaler 2 Puff(s) Inhalation every 6 hours  levothyroxine 25 MICROGram(s) Oral daily  metoprolol tartrate 25 milliGRAM(s) Oral two times a day  pantoprazole   Suspension 40 milliGRAM(s) Enteral Tube daily  potassium phosphate / sodium phosphate Powder (PHOS-NaK) 1 Packet(s) Oral once  tamsulosin 0.4 milliGRAM(s) Oral at bedtime    MEDICATIONS  (PRN):  acetaminophen    Suspension .. 650 milliGRAM(s) Oral every 6 hours PRN Temp greater or equal to 38C (100.4F), Mild Pain (1 - 3)      PHYSICAL EXAM:    Constitutional: NAD, awake   HEENT: PERRRA, no lo, neck supple     Respiratory: Breath sounds are clear bilaterally,   Cardiovascular: S1 and S2,  rrr s1-s2 no murmurs no edema le bi   Gastrointestinal: Bowel Sounds present, soft, NG tube noted   Extremities: No peripheral edema, bilateral mitten notted   Vascular: 2+ peripheral pulses  Neurological: A/O x 3    Musculoskeletal: 4/5 strength b/l upper and lower extremities  Skin: stage 3 decubitus Sacral, stage 2 back.     MEDICATIONS:  MEDICATIONS  (STANDING):  ALBUTerol    90 MICROgram(s) HFA Inhaler 2 Puff(s) Inhalation every 6 hours  apixaban 5 milliGRAM(s) Oral every 12 hours  dextrose 5%. 1000 milliLiter(s) (50 mL/Hr) IV Continuous <Continuous>  dextrose 50% Injectable 12.5 Gram(s) IV Push once  dextrose 50% Injectable 25 Gram(s) IV Push once  dextrose 50% Injectable 25 Gram(s) IV Push once  insulin lispro (HumaLOG) corrective regimen sliding scale   SubCutaneous every 6 hours  ipratropium 17 MICROgram(s) HFA Inhaler 2 Puff(s) Inhalation every 6 hours  levothyroxine 25 MICROGram(s) Oral daily  metoprolol tartrate 25 milliGRAM(s) Oral two times a day  pantoprazole   Suspension 40 milliGRAM(s) Enteral Tube daily  tamsulosin 0.4 milliGRAM(s) Oral at bedtime      LABS: All Labs Reviewed:                        10.3   11.21 )-----------( 276      ( 20 Jun 2020 07:20 )             35.2     06-20    144  |  101  |  42<H>  ----------------------------<  148<H>  3.7   |  36<H>  |  1.92<H>    Ca    9.4      20 Jun 2020 07:20  Phos  2.6     06-20  Mg     2.5     06-20    TPro  7.0  /  Alb  2.3<L>  /  TBili  0.8  /  DBili  x   /  AST  20  /  ALT  8<L>  /  AlkPhos  73  06-20          Blood Culture:     RADIOLOGY/EKG:    DVT PPX:    ADVANCED DIRECTIVE:    DISPOSITION:

## 2020-06-21 NOTE — PROGRESS NOTE ADULT - ASSESSMENT
Physical Exam  Gen:  Not in distress, awake and following commands  Lung:  Bi lateral air entry, Scattered rales , no wheezing   CV:   RRR + murm  Abd:  Soft, NT/ND.  BS normoactive, no masses to palp  Neuro:  Alert and confused no gross motor/sensory deficits    Assessment:  - VF s/p AICD firing on 6/17  S/p  Amio drip  - Acute hypercapnic respiratory failure Resolved, Extubated 6/14, non smoker , 911 Exposure - on Registry   - Acute on chronic Heart failure with reduced EF  - REY on CKD stage 4 worsening  - Hypercalcemia   - Afib  - DM type 2  - Hypothyroidism - Improving  - + COVID19 infection  - Metabolic encephalopathy worsening     Plan  - BP stable off  midodrine  - Lasix on hold as cr Increasing  - Continue low Dose BB  - keep Mg >2.0 and K >4.0, will give more K   - Cardio f/u to optimize meds  - d/w Dr. Mccullough hold Abx suspect pulm edema as cause of abnormal CXR  - Diet as tolerated  - currently tolerating RA o2.    - Nephrology f/u for management of Ca  - Cont. Anticoagulation for afib with eliquis  - Fingerstick glucose monitoring with coverage for hyperglycemia  - diet as per S&S    - Palliatve care f/u pt now made DNR  - d/w Son Jose [phone] Confirmed DNR/I 6/19/20  At this time no active CCM issues reconsult as needed.

## 2020-06-21 NOTE — PROGRESS NOTE ADULT - SUBJECTIVE AND OBJECTIVE BOX
Follow-up Critical Care Progress Note  Chief Complaint : COVID-19      pt seen and examined  comfortable  more alert    noted tele having ectopy  no aicd firing       Allergies :No Known Allergies      PAST MEDICAL & SURGICAL HISTORY:  GERD (gastroesophageal reflux disease)  Chronic kidney disease (CKD): Baseline creatinine 1.6  Type 2 diabetes mellitus  Heart failure, systolic and diastolic  Sciatica  Atrial fibrillation: Status post cardioversion  HTN (hypertension)  Neuropathy  Hypothyroid  COVID-19  Acute kidney failure  Artificial pacemaker      Medications:  MEDICATIONS  (STANDING):  ALBUTerol    90 MICROgram(s) HFA Inhaler 2 Puff(s) Inhalation every 6 hours  apixaban 5 milliGRAM(s) Oral every 12 hours  dextrose 5%. 1000 milliLiter(s) (50 mL/Hr) IV Continuous <Continuous>  dextrose 50% Injectable 12.5 Gram(s) IV Push once  dextrose 50% Injectable 25 Gram(s) IV Push once  dextrose 50% Injectable 25 Gram(s) IV Push once  insulin lispro (HumaLOG) corrective regimen sliding scale   SubCutaneous every 6 hours  ipratropium 17 MICROgram(s) HFA Inhaler 2 Puff(s) Inhalation every 6 hours  levothyroxine 25 MICROGram(s) Oral daily  metoprolol tartrate 25 milliGRAM(s) Oral two times a day  pantoprazole   Suspension 40 milliGRAM(s) Enteral Tube daily  tamsulosin 0.4 milliGRAM(s) Oral at bedtime    MEDICATIONS  (PRN):  acetaminophen    Suspension .. 650 milliGRAM(s) Oral every 6 hours PRN Temp greater or equal to 38C (100.4F), Mild Pain (1 - 3)      LABS:                        10.4   9.34  )-----------( 278      ( 21 Jun 2020 06:33 )             34.7     06-21    145  |  102  |  45<H>  ----------------------------<  86  3.6   |  37<H>  |  1.78<H>    Ca    9.4      21 Jun 2020 06:33  Phos  1.3     06-21  Mg     2.3     06-21    TPro  6.9  /  Alb  2.2<L>  /  TBili  1.0  /  DBili  x   /  AST  18  /  ALT  10  /  AlkPhos  65  06-21    COVID  06-12-20 @ 08:12  COVID -   Detected<!!>      COVID Biomarkers    06-21-20 @ 06:33 ESR --  ---  CRP --  ---  DDimer  370<H>   ---      ---   Ferritin 668<H>    06-18-20 @ 05:27 ESR --  ---  CRP --  ---  DDimer  593<H>   ---      ---   Ferritin 712<H>    06-15-20 @ 05:30 ESR --  ---  CRP --  ---  DDimer  339<H>   ---   LDH --   ---   Ferritin 341    06-12-20 @ 08:00 ESR --  ---  CRP 2.99<H>  ---  DDimer  464<H>   ---   LDH --   ---   Ferritin 394            CULTURES: (if applicable)    Culture - Urine (collected 06-12-20 @ 08:50)  Source: .Urine Clean Catch (Midstream)  Final Report (06-13-20 @ 10:25):    No growth    Culture - Blood (collected 06-12-20 @ 08:00)  Source: .Blood Blood-Peripheral  Final Report (06-17-20 @ 12:00):    No Growth Final    Culture - Blood (collected 06-12-20 @ 08:00)  Source: .Blood Blood-Peripheral  Final Report (06-17-20 @ 12:00):    No Growth Final            CAPILLARY BLOOD GLUCOSE      POCT Blood Glucose.: 85 mg/dL (21 Jun 2020 12:13)      RADIOLOGY  CXR:  < from: Xray Chest 1 View-PORTABLE IMMEDIATE (06.21.20 @ 11:17) >    Low lung volumes. Left AICD reidentified in situ. No change heart mediastinum. Feeding tube has been removed. Mild vascular congestion interstitial edema similar to prior. No gross focal infiltrate. Cardiac apex obscures left base. No significant pleural effusion.    Impression: As above    < end of copied text >  mild vasc congestion        VITALS:  T(C): 36.4 (06-21-20 @ 05:58), Max: 37.1 (06-20-20 @ 17:13)  T(F): 97.5 (06-21-20 @ 05:58), Max: 98.8 (06-20-20 @ 17:13)  HR: 74 (06-21-20 @ 10:17) (74 - 91)  BP: 100/60 (06-21-20 @ 05:58) (93/67 - 114/67)  BP(mean): --  ABP: --  ABP(mean): --  RR: 17 (06-21-20 @ 05:58) (16 - 18)  SpO2: 97% (06-21-20 @ 10:17) (95% - 98%)  CVP(mm Hg): --  CVP(cm H2O): --    Ins and Outs     06-20-20 @ 07:01  -  06-21-20 @ 07:00  --------------------------------------------------------  IN: 740 mL / OUT: 400 mL / NET: 340 mL                I&O's Detail    20 Jun 2020 07:01  -  21 Jun 2020 07:00  --------------------------------------------------------  IN:    Glucerna 1.5: 300 mL    Oral Fluid: 440 mL  Total IN: 740 mL    OUT:    Indwelling Catheter - Urethral: 100 mL    Voided: 300 mL  Total OUT: 400 mL    Total NET: 340 mL

## 2020-06-22 LAB
ALBUMIN SERPL ELPH-MCNC: 2.3 G/DL — LOW (ref 3.3–5)
ALP SERPL-CCNC: 66 U/L — SIGNIFICANT CHANGE UP (ref 40–120)
ALT FLD-CCNC: <6 U/L — LOW (ref 10–45)
ANION GAP SERPL CALC-SCNC: 7 MMOL/L — SIGNIFICANT CHANGE UP (ref 5–17)
AST SERPL-CCNC: 15 U/L — SIGNIFICANT CHANGE UP (ref 10–40)
BASOPHILS # BLD AUTO: 0.04 K/UL — SIGNIFICANT CHANGE UP (ref 0–0.2)
BASOPHILS NFR BLD AUTO: 0.4 % — SIGNIFICANT CHANGE UP (ref 0–2)
BILIRUB SERPL-MCNC: 0.8 MG/DL — SIGNIFICANT CHANGE UP (ref 0.2–1.2)
BUN SERPL-MCNC: 41 MG/DL — HIGH (ref 7–23)
CALCIUM SERPL-MCNC: 9.4 MG/DL — SIGNIFICANT CHANGE UP (ref 8.4–10.5)
CHLORIDE SERPL-SCNC: 103 MMOL/L — SIGNIFICANT CHANGE UP (ref 96–108)
CO2 SERPL-SCNC: 35 MMOL/L — HIGH (ref 22–31)
CREAT SERPL-MCNC: 1.66 MG/DL — HIGH (ref 0.5–1.3)
EOSINOPHIL # BLD AUTO: 0.36 K/UL — SIGNIFICANT CHANGE UP (ref 0–0.5)
EOSINOPHIL NFR BLD AUTO: 3.6 % — SIGNIFICANT CHANGE UP (ref 0–6)
GLUCOSE BLDC GLUCOMTR-MCNC: 71 MG/DL — SIGNIFICANT CHANGE UP (ref 70–99)
GLUCOSE BLDC GLUCOMTR-MCNC: 77 MG/DL — SIGNIFICANT CHANGE UP (ref 70–99)
GLUCOSE BLDC GLUCOMTR-MCNC: 81 MG/DL — SIGNIFICANT CHANGE UP (ref 70–99)
GLUCOSE BLDC GLUCOMTR-MCNC: 82 MG/DL — SIGNIFICANT CHANGE UP (ref 70–99)
GLUCOSE SERPL-MCNC: 84 MG/DL — SIGNIFICANT CHANGE UP (ref 70–99)
HCT VFR BLD CALC: 35.3 % — LOW (ref 39–50)
HGB BLD-MCNC: 10.4 G/DL — LOW (ref 13–17)
IMM GRANULOCYTES NFR BLD AUTO: 0.6 % — SIGNIFICANT CHANGE UP (ref 0–1.5)
LYMPHOCYTES # BLD AUTO: 1.56 K/UL — SIGNIFICANT CHANGE UP (ref 1–3.3)
LYMPHOCYTES # BLD AUTO: 15.5 % — SIGNIFICANT CHANGE UP (ref 13–44)
MAGNESIUM SERPL-MCNC: 2.2 MG/DL — SIGNIFICANT CHANGE UP (ref 1.6–2.6)
MCHC RBC-ENTMCNC: 27.9 PG — SIGNIFICANT CHANGE UP (ref 27–34)
MCHC RBC-ENTMCNC: 29.5 GM/DL — LOW (ref 32–36)
MCV RBC AUTO: 94.6 FL — SIGNIFICANT CHANGE UP (ref 80–100)
MONOCYTES # BLD AUTO: 0.95 K/UL — HIGH (ref 0–0.9)
MONOCYTES NFR BLD AUTO: 9.4 % — SIGNIFICANT CHANGE UP (ref 2–14)
NEUTROPHILS # BLD AUTO: 7.09 K/UL — SIGNIFICANT CHANGE UP (ref 1.8–7.4)
NEUTROPHILS NFR BLD AUTO: 70.5 % — SIGNIFICANT CHANGE UP (ref 43–77)
NRBC # BLD: 0 /100 WBCS — SIGNIFICANT CHANGE UP (ref 0–0)
PHOSPHATE SERPL-MCNC: 1.9 MG/DL — LOW (ref 2.5–4.5)
PLATELET # BLD AUTO: 313 K/UL — SIGNIFICANT CHANGE UP (ref 150–400)
POTASSIUM SERPL-MCNC: 3.5 MMOL/L — SIGNIFICANT CHANGE UP (ref 3.5–5.3)
POTASSIUM SERPL-SCNC: 3.5 MMOL/L — SIGNIFICANT CHANGE UP (ref 3.5–5.3)
PROT SERPL-MCNC: 7.1 G/DL — SIGNIFICANT CHANGE UP (ref 6–8.3)
RBC # BLD: 3.73 M/UL — LOW (ref 4.2–5.8)
RBC # FLD: 17.7 % — HIGH (ref 10.3–14.5)
SODIUM SERPL-SCNC: 145 MMOL/L — SIGNIFICANT CHANGE UP (ref 135–145)
WBC # BLD: 10.06 K/UL — SIGNIFICANT CHANGE UP (ref 3.8–10.5)
WBC # FLD AUTO: 10.06 K/UL — SIGNIFICANT CHANGE UP (ref 3.8–10.5)

## 2020-06-22 PROCEDURE — 99232 SBSQ HOSP IP/OBS MODERATE 35: CPT

## 2020-06-22 PROCEDURE — 99222 1ST HOSP IP/OBS MODERATE 55: CPT | Mod: GC

## 2020-06-22 PROCEDURE — 99233 SBSQ HOSP IP/OBS HIGH 50: CPT

## 2020-06-22 PROCEDURE — 93010 ELECTROCARDIOGRAM REPORT: CPT

## 2020-06-22 RX ORDER — SODIUM CHLORIDE 9 MG/ML
1000 INJECTION, SOLUTION INTRAVENOUS
Refills: 0 | Status: DISCONTINUED | OUTPATIENT
Start: 2020-06-22 | End: 2020-06-23

## 2020-06-22 RX ORDER — SODIUM,POTASSIUM PHOSPHATES 278-250MG
1 POWDER IN PACKET (EA) ORAL ONCE
Refills: 0 | Status: COMPLETED | OUTPATIENT
Start: 2020-06-22 | End: 2020-06-22

## 2020-06-22 RX ADMIN — Medication 2 PUFF(S): at 03:41

## 2020-06-22 RX ADMIN — PANTOPRAZOLE SODIUM 40 MILLIGRAM(S): 20 TABLET, DELAYED RELEASE ORAL at 13:32

## 2020-06-22 RX ADMIN — ALBUTEROL 2 PUFF(S): 90 AEROSOL, METERED ORAL at 21:45

## 2020-06-22 RX ADMIN — Medication 0: at 17:16

## 2020-06-22 RX ADMIN — Medication 25 MILLIGRAM(S): at 17:16

## 2020-06-22 RX ADMIN — Medication 2 PUFF(S): at 09:17

## 2020-06-22 RX ADMIN — Medication 2 PUFF(S): at 21:46

## 2020-06-22 RX ADMIN — Medication 25 MILLIGRAM(S): at 05:57

## 2020-06-22 RX ADMIN — Medication 1 PACKET(S): at 13:31

## 2020-06-22 RX ADMIN — APIXABAN 5 MILLIGRAM(S): 2.5 TABLET, FILM COATED ORAL at 17:16

## 2020-06-22 RX ADMIN — Medication 2 PUFF(S): at 16:08

## 2020-06-22 RX ADMIN — Medication 25 MICROGRAM(S): at 06:09

## 2020-06-22 RX ADMIN — APIXABAN 5 MILLIGRAM(S): 2.5 TABLET, FILM COATED ORAL at 05:57

## 2020-06-22 RX ADMIN — TAMSULOSIN HYDROCHLORIDE 0.4 MILLIGRAM(S): 0.4 CAPSULE ORAL at 23:16

## 2020-06-22 RX ADMIN — ALBUTEROL 2 PUFF(S): 90 AEROSOL, METERED ORAL at 16:08

## 2020-06-22 RX ADMIN — ALBUTEROL 2 PUFF(S): 90 AEROSOL, METERED ORAL at 03:41

## 2020-06-22 RX ADMIN — Medication 0: at 13:31

## 2020-06-22 RX ADMIN — ALBUTEROL 2 PUFF(S): 90 AEROSOL, METERED ORAL at 09:17

## 2020-06-22 NOTE — PROGRESS NOTE ADULT - SUBJECTIVE AND OBJECTIVE BOX
seen at bedside.     no chest pain no sob   no events overnight     reviewed w nursing and ID team     ros all others are neg     Vital Signs Last 24 Hrs  T(C): 36.9 (22 Jun 2020 04:27), Max: 37.2 (21 Jun 2020 17:45)  T(F): 98.4 (22 Jun 2020 04:27), Max: 99 (21 Jun 2020 17:45)  HR: 78 (22 Jun 2020 09:17) (78 - 84)  BP: 140/82 (22 Jun 2020 04:27) (118/65 - 140/82)  BP(mean): --  RR: 17 (22 Jun 2020 04:27) (17 - 17)  SpO2: 97% (22 Jun 2020 09:17) (97% - 100%)                          10.4   10.06 )-----------( 313      ( 22 Jun 2020 05:40 )             35.3     06-22    145  |  103  |  41<H>  ----------------------------<  84  3.5   |  35<H>  |  1.66<H>    Ca    9.4      22 Jun 2020 05:40  Phos  1.9     06-22  Mg     2.2     06-22    TPro  7.1  /  Alb  2.3<L>  /  TBili  0.8  /  DBili  x   /  AST  15  /  ALT  <6<L>  /  AlkPhos  66  06-22

## 2020-06-22 NOTE — PROGRESS NOTE ADULT - ASSESSMENT
A/P 75 M acute hypercapnic respiratory failure, acute on chronic HFrEF VF s/p AICD firing, hypothyroidism, BPH CKD S3-4    Acute hypoxic respiratory failure secondary to COVID-19.   Hypercapnic respiratory failure   s/p extubation 6/14  Resolved       medically deconditioned, and plan for possible rehab, pt ot [facility]     *VF s/p AICD firing, wide complex intermittent beats most likely related to CM, now off amio per cards team   cards team seen, will have team revisit reviewed with cards team plan of care  keep K and mg 4 and 2 respectively   device check in am per cards team     *Afib rate controlled   Cont lopressor   Cont Eliquis   Keep K and Mg 4 and 2 respectively.   check TSH     *Acute on chronic HFrEF appears compensated   EF 25-30%   Cont BB   Lasix on hold bump up in Cr   consider restarting lasix per pulm and cards team   renal team following   io uo and regular weight checks     Nonoliguric REY on CKD resolved, at baseline   nephrology consult appreciated  Lasix Hold for now as above   io ou and regular weight checks.   avoid nephrotoxic agents     *Hypercalcemia     resolved      *IDDM controlled   ok continue the current regimen     *Hypothyroidism   improving   continue synthroid     VTE proph     Palliative :  f/u pt more awake, alert this Am. Pt looks comfortable. Son Jose called me this AM , we spoke and had long discussion about how pt is today, more awake, and trying to eat. Jose reported he was able to come in to see his dad this weekend. He feels it helped his Dad tremendously , and hopes to be seeing him again in near future. Pt still covid + tested last on 6/21.  Son is talking about wanting to take his Dad home instead of [facility]. Son feels the pt may do better being at home .  I explained about what home hospice services would supply , and son was interested, however at this point he will  have to discuss this with his mother who lives at home with the pt.  Pt will be re-tested for covid.  Recs pending clinical course, pt may go to [facility] vs home with home care or home w/ hospice.   Will follow w/ med team.

## 2020-06-22 NOTE — PHYSICAL THERAPY INITIAL EVALUATION ADULT - PERTINENT HX OF CURRENT PROBLEM, REHAB EVAL
patient from [facility] with AMS due to acute resp failure, intubated +covid, extubated on 6/14, pt had NGT and pulled it out on 6/20

## 2020-06-22 NOTE — PHYSICAL THERAPY INITIAL EVALUATION ADULT - ADDITIONAL COMMENTS
as per pt he was a long term resident at , pt may be poor historian, reports he was participating in physical therapy but limited mobility, reported being hoyered OOB and that he practiced standing with a walker

## 2020-06-22 NOTE — CONSULT NOTE ADULT - SUBJECTIVE AND OBJECTIVE BOX
Patient is a 75y old  Male who presents with a chief complaint of Respiratory failure (22 Jun 2020 15:36)      HPI:  75 year old male with extensive PMH including CAD, systolic/diastolic heart failure status post AICD, atrial fibrillation status post cardioversion, COPD, type 2 DM, CKD, hypothyroidism who presented to [facility] from [facility] with altered mental status.  ABG revealed acute hypercarbic respiratory failure and patient was intubated, COVID positive.  Transferred to ICU for further care.    s/p extubation 6/14  being followed by cards, palliative     REVIEW OF SYSTEMS: No chest pain, shortness of breath, nausea, vomiting or diarhea.      PAST MEDICAL & SURGICAL HISTORY  GERD (gastroesophageal reflux disease)  Chronic kidney disease (CKD)  Type 2 diabetes mellitus  Heart failure, systolic and diastolic  Sciatica  Heart failure  Atrial fibrillation  HTN (hypertension)  Neuropathy  Diabetes  Hypokalemia  HLD (hyperlipidemia)  Hypothyroid  Bacteremia  Acute respiratory failure  Dehydration  COVID-19  Acute kidney failure  Artificial pacemaker      SOCIAL HISTORY  Smoking - Denied, EtOH - Denied, Drugs - Denied    FUNCTIONAL HISTORY:   Lived at [facility]   required assist     CURRENT FUNCTIONAL STATUS: max a       FAMILY HISTORY n/c to rehab       RECENT LABS/IMAGING  CBC Full  -  ( 22 Jun 2020 05:40 )  WBC Count : 10.06 K/uL  RBC Count : 3.73 M/uL  Hemoglobin : 10.4 g/dL  Hematocrit : 35.3 %  Platelet Count - Automated : 313 K/uL  Mean Cell Volume : 94.6 fl  Mean Cell Hemoglobin : 27.9 pg  Mean Cell Hemoglobin Concentration : 29.5 gm/dL  Auto Neutrophil # : 7.09 K/uL  Auto Lymphocyte # : 1.56 K/uL  Auto Monocyte # : 0.95 K/uL  Auto Eosinophil # : 0.36 K/uL  Auto Basophil # : 0.04 K/uL  Auto Neutrophil % : 70.5 %  Auto Lymphocyte % : 15.5 %  Auto Monocyte % : 9.4 %  Auto Eosinophil % : 3.6 %  Auto Basophil % : 0.4 %    06-22    145  |  103  |  41<H>  ----------------------------<  84  3.5   |  35<H>  |  1.66<H>    Ca    9.4      22 Jun 2020 05:40  Phos  1.9     06-22  Mg     2.2     06-22    TPro  7.1  /  Alb  2.3<L>  /  TBili  0.8  /  DBili  x   /  AST  15  /  ALT  <6<L>  /  AlkPhos  66  06-22        VITALS  T(C): 36.9 (06-22-20 @ 16:17), Max: 37.2 (06-21-20 @ 17:45)  HR: 80 (06-22-20 @ 16:17) (78 - 84)  BP: 129/88 (06-22-20 @ 16:17) (118/65 - 140/82)  RR: 15 (06-22-20 @ 16:17) (15 - 17)  SpO2: 96% (06-22-20 @ 16:17) (96% - 100%)  Wt(kg): --    ALLERGIES  No Known Allergies      MEDICATIONS   acetaminophen    Suspension .. 650 milliGRAM(s) Oral every 6 hours PRN  ALBUTerol    90 MICROgram(s) HFA Inhaler 2 Puff(s) Inhalation every 6 hours  apixaban 5 milliGRAM(s) Oral every 12 hours  dextrose 5%. 1000 milliLiter(s) IV Continuous <Continuous>  dextrose 50% Injectable 12.5 Gram(s) IV Push once  dextrose 50% Injectable 25 Gram(s) IV Push once  dextrose 50% Injectable 25 Gram(s) IV Push once  insulin lispro (HumaLOG) corrective regimen sliding scale   SubCutaneous every 6 hours  ipratropium 17 MICROgram(s) HFA Inhaler 2 Puff(s) Inhalation every 6 hours  levothyroxine 25 MICROGram(s) Oral daily  metoprolol tartrate 25 milliGRAM(s) Oral two times a day  pantoprazole   Suspension 40 milliGRAM(s) Enteral Tube daily  tamsulosin 0.4 milliGRAM(s) Oral at bedtime      ----------------------------------------------------------------------------------------  PHYSICAL EXAM  Constitutional - NAD, Comfortable  HEENT - NCAT, EOMI  Neck - Supple, No limited ROM  Chest - CTA bilaterally, No wheeze, No rhonchi, No crackles  Cardiovascular - RRR, S1S2, No murmurs  Abdomen - BS+, Soft, NTND  Extremities - No C/C/E, No calf tenderness   Neurologic Exam -                    Cognitive - Awake, Alert, AAO to self, place     Communication - hypophonic         Motor -3/5 throughout                       Sensory - Intact to LT     Reflexes - DTR Intact, No primitive reflexive     Balance - poor sitting   Psychiatric - Mood stable, Affect WNL

## 2020-06-22 NOTE — PROGRESS NOTE ADULT - ASSESSMENT
75 M acute hypercapnic respiratory failure, acute on chronic HFrEF VF s/p AICD firing, hypothyroidism, BPH CKD S3-4      Acute hypoxic respiratory failure secondary to COVID-19.   Hypercapnic respiratory failure   s/p extubation 6/14  Resolved       medically deconditioned, and plan for possible rehab, pt ot [facility]     *VF s/p AICD firing, wide complex intermittent beats most likely related to CM, may need amio per cards team   cards team seen, will have team revisit reviewed with cards team plan of care  keep K and mg 4 and 2 respectively   device check in am per cards team     *Afib rate controlled   Cont lopressor   Cont Eliquis   Keep K and Mg 4 and 2 respectively.   check TSH     *Acute on chronic HFrEF appears compensated   EF 25-30%   Cont BB   Lasix on hold bump up in Cr   consider restarting lasix per pulm and cards team   renal team following   io uo and regular weight checks     Nonoliguric REY on CKD resolved, at baseline   nephrology consult appreciated  Lasix Hold for now as above   io ou and regular weight checks.   avoid nephrotoxic agents     *Hypercalcemia     resolved      *IDDM controlled   ok continue the current regimen     *Hypothyroidism   improving   continue synthroid     VTE proph   DNR/I pulm and pal teams seen     Reviewed w renetta Marc plan of care over the phone

## 2020-06-22 NOTE — CONSULT NOTE ADULT - ASSESSMENT
76 yo male with respiratory failure, debility  1. PT- bed mobility,transfers, gait and balance training  2. OT- ADL'S  3.  Patient would benefit from subacute rehab vs home with [facility]HA

## 2020-06-22 NOTE — PROGRESS NOTE ADULT - SUBJECTIVE AND OBJECTIVE BOX
Progress: pt back on medicine floor, more awake, alert, talking few words  this AM     Present Symptoms:   Dyspnea: mild  Nausea/Vomiting:   Anxiety:    Depressed Mood:   Fatigue:   Loss of appetite:   Pain:         no          location:   Review of Systems: Unable to obtain due to poor mentation    MEDICATIONS  (STANDING):  ALBUTerol    90 MICROgram(s) HFA Inhaler 2 Puff(s) Inhalation every 6 hours  apixaban 5 milliGRAM(s) Oral every 12 hours  dextrose 5%. 1000 milliLiter(s) (50 mL/Hr) IV Continuous <Continuous>  dextrose 50% Injectable 12.5 Gram(s) IV Push once  dextrose 50% Injectable 25 Gram(s) IV Push once  dextrose 50% Injectable 25 Gram(s) IV Push once  insulin lispro (HumaLOG) corrective regimen sliding scale   SubCutaneous every 6 hours  ipratropium 17 MICROgram(s) HFA Inhaler 2 Puff(s) Inhalation every 6 hours  levothyroxine 25 MICROGram(s) Oral daily  metoprolol tartrate 25 milliGRAM(s) Oral two times a day  pantoprazole   Suspension 40 milliGRAM(s) Enteral Tube daily  tamsulosin 0.4 milliGRAM(s) Oral at bedtime    MEDICATIONS  (PRN):  acetaminophen    Suspension .. 650 milliGRAM(s) Oral every 6 hours PRN Temp greater or equal to 38C (100.4F), Mild Pain (1 - 3)      PHYSICAL EXAM:  Vital Signs Last 24 Hrs  T(C): 36.9 (22 Jun 2020 04:27), Max: 37.2 (21 Jun 2020 17:45)  T(F): 98.4 (22 Jun 2020 04:27), Max: 99 (21 Jun 2020 17:45)  HR: 78 (22 Jun 2020 09:17) (78 - 84)  BP: 140/82 (22 Jun 2020 04:27) (118/65 - 140/82)  BP(mean): --  RR: 17 (22 Jun 2020 04:27) (17 - 17)  SpO2: 97% (22 Jun 2020 09:17) (97% - 100%)  General: alert  oriented x 1-2____      Oral intake ability:  oral feeding w/ assist   Diet: as barbara      LABS:                          10.4   10.06 )-----------( 313      ( 22 Jun 2020 05:40 )             35.3     06-22    145  |  103  |  41<H>  ----------------------------<  84  3.5   |  35<H>  |  1.66<H>    Ca    9.4      22 Jun 2020 05:40  Phos  1.9     06-22  Mg     2.2     06-22    TPro  7.1  /  Alb  2.3<L>  /  TBili  0.8  /  DBili  x   /  AST  15  /  ALT  <6<L>  /  AlkPhos  66  06-22        RADIOLOGY & ADDITIONAL STUDIES:< from: Xray Chest 1 View-PORTABLE IMMEDIATE (06.21.20 @ 11:17) >  EXAM:  XR CHEST PORTABLE IMMED 1V      PROCEDURE DATE:  06/21/2020        INTERPRETATION:  Follow-up. Hypoxia    AP portable chest. Prior 6/19/2020.    Low lung volumes. Left AICD reidentified in situ. No change heart mediastinum. Feeding tube has been removed. Mild vascular congestion interstitial edema similar to prior. No gross focal infiltrate. Cardiac apex obscures left base. No significant pleural effusion.    Impression: As above          ADVANCE DIRECTIVES:  MOLST  DNR/DNI   Advanced Care Planning discussion total time spent:

## 2020-06-22 NOTE — PROGRESS NOTE ADULT - ASSESSMENT
Imp    pt recovering from Covid 19 with significant lv systolic dysfunction, moderate as but with good heart rate and bp      suggest    renal insufficiency precludes ace/arb  would try low dose nitrates and hydralazine watching bp and renal indices    suggest  isordil 5 mg bid and if well tolerated hydralazine 10 bid    if those are tolerated and no hypotension-suggest increase in bblocker as well

## 2020-06-22 NOTE — PROGRESS NOTE ADULT - SUBJECTIVE AND OBJECTIVE BOX
Resting    Vital Signs Last 24 Hrs  T(C): 36.9 (06-22-20 @ 16:17), Max: 36.9 (06-22-20 @ 04:27)  T(F): 98.4 (06-22-20 @ 16:17), Max: 98.4 (06-22-20 @ 04:27)  HR: 80 (06-22-20 @ 16:17) (78 - 83)  BP: 129/88 (06-22-20 @ 16:17) (118/65 - 140/82)  RR: 15 (06-22-20 @ 16:17) (15 - 17)  SpO2: 96% (06-22-20 @ 16:17) (96% - 100%)    s1s2  coarse BS  soft  sm edema                                                                                 10.4   10.06 )-----------( 313      ( 22 Jun 2020 05:40 )             35.3     22 Jun 2020 05:40    145    |  103    |  41     ----------------------------<  84     3.5     |  35     |  1.66     Ca    9.4        22 Jun 2020 05:40  Phos  1.9       22 Jun 2020 05:40  Mg     2.2       22 Jun 2020 05:40    TPro  7.1    /  Alb  2.3    /  TBili  0.8    /  DBili  x      /  AST  15     /  ALT  <6     /  AlkPhos  66     22 Jun 2020 05:40    LIVER FUNCTIONS - ( 22 Jun 2020 05:40 )  Alb: 2.3 g/dL / Pro: 7.1 g/dL / ALK PHOS: 66 U/L / ALT: <6 U/L / AST: 15 U/L / GGT: x           acetaminophen    Suspension .. 650 milliGRAM(s) Oral every 6 hours PRN  ALBUTerol    90 MICROgram(s) HFA Inhaler 2 Puff(s) Inhalation every 6 hours  apixaban 5 milliGRAM(s) Oral every 12 hours  dextrose 5%. 1000 milliLiter(s) IV Continuous <Continuous>  dextrose 50% Injectable 12.5 Gram(s) IV Push once  dextrose 50% Injectable 25 Gram(s) IV Push once  dextrose 50% Injectable 25 Gram(s) IV Push once  insulin lispro (HumaLOG) corrective regimen sliding scale   SubCutaneous every 6 hours  ipratropium 17 MICROgram(s) HFA Inhaler 2 Puff(s) Inhalation every 6 hours  levothyroxine 25 MICROGram(s) Oral daily  metoprolol tartrate 25 milliGRAM(s) Oral two times a day  pantoprazole   Suspension 40 milliGRAM(s) Enteral Tube daily  tamsulosin 0.4 milliGRAM(s) Oral at bedtime    A/P:    Severe CM, EF 25-30%, mod TR/AS  Adm w/resp failure, CHF/COPD exacerbation, COVID +  S/p hemodynamic REY w/peak Cr 2.19 - 6/12   Improved w/marco Cr 1.19 - 6/16  S/p rising Cr again, hemodynamic/cardio-renal REY  Renal SONO w/o hydro  On Flomax  Avoid nephrotoxins  Ct is improving  Hold Lasix  I/Os, daily weights  F/u [facility]    [phone]

## 2020-06-22 NOTE — CHART NOTE - NSCHARTNOTEFT_GEN_A_CORE
NUTRITION FOLLOW UP    SOURCE: Patient [)   Family [ ]     other [ X] RN    DIET: Dysphagia 1 w/ nectar thick liquids dash    PATIENT REPORT[ ] nausea  [ ] vomiting [ ] diarrhea [ ] constipation  [ ]chewing problems [X ] swallowing issues  [ ] other: no GI distress    PO INTAKE:  < 50% [X ]   50-75%  [ ]   %  []  other :    SOURCE: for PO intake [] Patient [ ] family [ ] chart [ X] staff [ ] other    ENTERAL/PARENTERAL NUTRITION: n/a    CURRENT WEIGHT: Weight (kg): 85.9 (06-16 @ 16:43)     6/21 85.8 KG. (stable)    PERTINENT LABS:  Date: 22 Jun 2020 05:40  Hemoglobin 10.4   Hematocrit 35.3     Date: 06-22  Sodium 145  Potassium 3.5  Glucose Serum 84  BUN 41<H>      Creatinine    ACCUCHECK  POCT Blood Glucose.: 77 mg/dL (22 Jun 2020 06:09)  POCT Blood Glucose.: 83 mg/dL (21 Jun 2020 23:08)  POCT Blood Glucose.: 85 mg/dL (21 Jun 2020 17:24)  POCT Blood Glucose.: 85 mg/dL (21 Jun 2020 12:13)      SKIN: stage 2 back, unstageable  coccyx, edema noted b/l legs, right foot, fecal incontinence noted    ESTIMATED NEEDS:   [X] no change since previous assessment  [ ] recalculated:     PREVIOUS NUTRITION DIAGNOSIS: addressed    NUTRITION DIAGNOSIS is   [X] ongoing    NEW NUTRITION DIAGNOSIS: [X] not applicable    MONITORING AND EVALUATION:   Current diet order is appropriate and is well tolerated, but will monitor for any changes that may be needed    [X] PO intake [X] Tolerance to diet prescription [X] weights [X] follow up per protocol    NUTRITION RECOMMENDATIONS: add glucerna shakes bid    RD remains available SHIMON Cruz RD CDE

## 2020-06-22 NOTE — PROGRESS NOTE ADULT - SUBJECTIVE AND OBJECTIVE BOX
Follow up for vt  SUBJ: patient without vt since removal of amio.  PMH  GERD (gastroesophageal reflux disease)  Chronic kidney disease (CKD)  Type 2 diabetes mellitus  Heart failure, systolic and diastolic  Sciatica  Heart failure  Atrial fibrillation  HTN (hypertension)  Neuropathy  Diabetes  Hypokalemia  HLD (hyperlipidemia)  Hypothyroid  Bacteremia  Acute respiratory failure  Dehydration  COVID-19  Acute kidney failure      MEDICATIONS  (STANDING):  ALBUTerol    90 MICROgram(s) HFA Inhaler 2 Puff(s) Inhalation every 6 hours  apixaban 5 milliGRAM(s) Oral every 12 hours  dextrose 5%. 1000 milliLiter(s) (50 mL/Hr) IV Continuous <Continuous>  dextrose 50% Injectable 12.5 Gram(s) IV Push once  dextrose 50% Injectable 25 Gram(s) IV Push once  dextrose 50% Injectable 25 Gram(s) IV Push once  insulin lispro (HumaLOG) corrective regimen sliding scale   SubCutaneous every 6 hours  ipratropium 17 MICROgram(s) HFA Inhaler 2 Puff(s) Inhalation every 6 hours  levothyroxine 25 MICROGram(s) Oral daily  metoprolol tartrate 25 milliGRAM(s) Oral two times a day  pantoprazole   Suspension 40 milliGRAM(s) Enteral Tube daily  tamsulosin 0.4 milliGRAM(s) Oral at bedtime    MEDICATIONS  (PRN):  acetaminophen    Suspension .. 650 milliGRAM(s) Oral every 6 hours PRN Temp greater or equal to 38C (100.4F), Mild Pain (1 - 3)        PHYSICAL EXAM:  Vital Signs Last 24 Hrs  T(C): 36.9 (22 Jun 2020 04:27), Max: 37.2 (21 Jun 2020 17:45)  T(F): 98.4 (22 Jun 2020 04:27), Max: 99 (21 Jun 2020 17:45)  HR: 78 (22 Jun 2020 09:17) (78 - 84)  BP: 140/82 (22 Jun 2020 04:27) (118/65 - 140/82)  BP(mean): --  RR: 17 (22 Jun 2020 04:27) (17 - 17)  SpO2: 97% (22 Jun 2020 09:17) (97% - 100%)          TELEMETRY:paced    ECG:    LABS:                        10.4   10.06 )-----------( 313      ( 22 Jun 2020 05:40 )             35.3     06-22    145  |  103  |  41<H>  ----------------------------<  84  3.5   |  35<H>  |  1.66<H>    Ca    9.4      22 Jun 2020 05:40  Phos  1.9     06-22  Mg     2.2     06-22    TPro  7.1  /  Alb  2.3<L>  /  TBili  0.8  /  DBili  x   /  AST  15  /  ALT  <6<L>  /  AlkPhos  66  06-22            I&O's Summary    21 Jun 2020 07:01  -  22 Jun 2020 07:00  --------------------------------------------------------  IN: 0 mL / OUT: 650 mL / NET: -650 mL    22 Jun 2020 07:01  -  22 Jun 2020 15:18  --------------------------------------------------------  IN: 0 mL / OUT: 300 mL / NET: -300 mL      BNP    RADIOLOGY & ADDITIONAL STUDIES:    ECHO:

## 2020-06-23 LAB
ALBUMIN SERPL ELPH-MCNC: 2.2 G/DL — LOW (ref 3.3–5)
ALP SERPL-CCNC: 63 U/L — SIGNIFICANT CHANGE UP (ref 40–120)
ALT FLD-CCNC: <6 U/L — LOW (ref 10–45)
ANION GAP SERPL CALC-SCNC: 4 MMOL/L — LOW (ref 5–17)
AST SERPL-CCNC: 14 U/L — SIGNIFICANT CHANGE UP (ref 10–40)
BASOPHILS # BLD AUTO: 0.06 K/UL — SIGNIFICANT CHANGE UP (ref 0–0.2)
BASOPHILS NFR BLD AUTO: 0.7 % — SIGNIFICANT CHANGE UP (ref 0–2)
BILIRUB SERPL-MCNC: 0.8 MG/DL — SIGNIFICANT CHANGE UP (ref 0.2–1.2)
BUN SERPL-MCNC: 38 MG/DL — HIGH (ref 7–23)
CALCIUM SERPL-MCNC: 9.3 MG/DL — SIGNIFICANT CHANGE UP (ref 8.4–10.5)
CHLORIDE SERPL-SCNC: 104 MMOL/L — SIGNIFICANT CHANGE UP (ref 96–108)
CO2 SERPL-SCNC: 37 MMOL/L — HIGH (ref 22–31)
CREAT SERPL-MCNC: 1.46 MG/DL — HIGH (ref 0.5–1.3)
EOSINOPHIL # BLD AUTO: 0.3 K/UL — SIGNIFICANT CHANGE UP (ref 0–0.5)
EOSINOPHIL NFR BLD AUTO: 3.4 % — SIGNIFICANT CHANGE UP (ref 0–6)
GLUCOSE BLDC GLUCOMTR-MCNC: 83 MG/DL — SIGNIFICANT CHANGE UP (ref 70–99)
GLUCOSE BLDC GLUCOMTR-MCNC: 85 MG/DL — SIGNIFICANT CHANGE UP (ref 70–99)
GLUCOSE BLDC GLUCOMTR-MCNC: 86 MG/DL — SIGNIFICANT CHANGE UP (ref 70–99)
GLUCOSE BLDC GLUCOMTR-MCNC: 98 MG/DL — SIGNIFICANT CHANGE UP (ref 70–99)
GLUCOSE SERPL-MCNC: 86 MG/DL — SIGNIFICANT CHANGE UP (ref 70–99)
HCT VFR BLD CALC: 35.3 % — LOW (ref 39–50)
HGB BLD-MCNC: 10.4 G/DL — LOW (ref 13–17)
IMM GRANULOCYTES NFR BLD AUTO: 0.4 % — SIGNIFICANT CHANGE UP (ref 0–1.5)
LYMPHOCYTES # BLD AUTO: 1.5 K/UL — SIGNIFICANT CHANGE UP (ref 1–3.3)
LYMPHOCYTES # BLD AUTO: 16.8 % — SIGNIFICANT CHANGE UP (ref 13–44)
MAGNESIUM SERPL-MCNC: 2.1 MG/DL — SIGNIFICANT CHANGE UP (ref 1.6–2.6)
MCHC RBC-ENTMCNC: 27.9 PG — SIGNIFICANT CHANGE UP (ref 27–34)
MCHC RBC-ENTMCNC: 29.5 GM/DL — LOW (ref 32–36)
MCV RBC AUTO: 94.6 FL — SIGNIFICANT CHANGE UP (ref 80–100)
MONOCYTES # BLD AUTO: 0.94 K/UL — HIGH (ref 0–0.9)
MONOCYTES NFR BLD AUTO: 10.5 % — SIGNIFICANT CHANGE UP (ref 2–14)
NEUTROPHILS # BLD AUTO: 6.1 K/UL — SIGNIFICANT CHANGE UP (ref 1.8–7.4)
NEUTROPHILS NFR BLD AUTO: 68.2 % — SIGNIFICANT CHANGE UP (ref 43–77)
NRBC # BLD: 0 /100 WBCS — SIGNIFICANT CHANGE UP (ref 0–0)
PHOSPHATE SERPL-MCNC: 2.1 MG/DL — LOW (ref 2.5–4.5)
PLATELET # BLD AUTO: 311 K/UL — SIGNIFICANT CHANGE UP (ref 150–400)
POTASSIUM SERPL-MCNC: 3.3 MMOL/L — LOW (ref 3.5–5.3)
POTASSIUM SERPL-SCNC: 3.3 MMOL/L — LOW (ref 3.5–5.3)
PROT SERPL-MCNC: 6.8 G/DL — SIGNIFICANT CHANGE UP (ref 6–8.3)
RBC # BLD: 3.73 M/UL — LOW (ref 4.2–5.8)
RBC # FLD: 17.6 % — HIGH (ref 10.3–14.5)
SARS-COV-2 RNA SPEC QL NAA+PROBE: SIGNIFICANT CHANGE UP
SODIUM SERPL-SCNC: 145 MMOL/L — SIGNIFICANT CHANGE UP (ref 135–145)
WBC # BLD: 8.94 K/UL — SIGNIFICANT CHANGE UP (ref 3.8–10.5)
WBC # FLD AUTO: 8.94 K/UL — SIGNIFICANT CHANGE UP (ref 3.8–10.5)

## 2020-06-23 PROCEDURE — 99232 SBSQ HOSP IP/OBS MODERATE 35: CPT

## 2020-06-23 RX ORDER — POTASSIUM CHLORIDE 20 MEQ
40 PACKET (EA) ORAL EVERY 4 HOURS
Refills: 0 | Status: DISCONTINUED | OUTPATIENT
Start: 2020-06-23 | End: 2020-06-23

## 2020-06-23 RX ADMIN — Medication 2 PUFF(S): at 04:55

## 2020-06-23 RX ADMIN — ALBUTEROL 2 PUFF(S): 90 AEROSOL, METERED ORAL at 21:40

## 2020-06-23 RX ADMIN — ALBUTEROL 2 PUFF(S): 90 AEROSOL, METERED ORAL at 15:25

## 2020-06-23 RX ADMIN — ALBUTEROL 2 PUFF(S): 90 AEROSOL, METERED ORAL at 04:55

## 2020-06-23 RX ADMIN — Medication 25 MILLIGRAM(S): at 06:41

## 2020-06-23 RX ADMIN — Medication 2 PUFF(S): at 21:40

## 2020-06-23 RX ADMIN — TAMSULOSIN HYDROCHLORIDE 0.4 MILLIGRAM(S): 0.4 CAPSULE ORAL at 22:34

## 2020-06-23 RX ADMIN — PANTOPRAZOLE SODIUM 40 MILLIGRAM(S): 20 TABLET, DELAYED RELEASE ORAL at 11:32

## 2020-06-23 RX ADMIN — Medication 2 PUFF(S): at 15:26

## 2020-06-23 RX ADMIN — APIXABAN 5 MILLIGRAM(S): 2.5 TABLET, FILM COATED ORAL at 06:41

## 2020-06-23 RX ADMIN — ALBUTEROL 2 PUFF(S): 90 AEROSOL, METERED ORAL at 10:02

## 2020-06-23 RX ADMIN — Medication 25 MICROGRAM(S): at 06:41

## 2020-06-23 RX ADMIN — Medication 25 MILLIGRAM(S): at 17:18

## 2020-06-23 RX ADMIN — APIXABAN 5 MILLIGRAM(S): 2.5 TABLET, FILM COATED ORAL at 17:18

## 2020-06-23 RX ADMIN — Medication 2 PUFF(S): at 10:02

## 2020-06-23 NOTE — PROGRESS NOTE ADULT - ASSESSMENT
75 M acute hypercapnic respiratory failure, acute on chronic HFrEF VF s/p AICD firing, hypothyroidism, BPH CKD S3-4      Acute hypoxic respiratory failure secondary to COVID-19.   Hypercapnic respiratory failure   s/p extubation 6/14  Resolved       medically deconditioned, and plan for possible rehab, pt ot [facility]     *VF s/p AICD firing, wide complex intermittent beats most likely related to CM,  off amio  keep K and mg 4 and 2 respectively   cardio on board    *Afib rate controlled   Cont lopressor   Cont Eliquis   Keep K and Mg 4 and 2 respectively.   normal TSH     *Acute on chronic HFrEF appears compensated   EF 25-30%   Cont BB   Lasix on hold bump up in Cr   consider restarting lasix per pulm and cards team   renal team following   io uo and regular weight checks   cardio cx appreciated. start imdur 5 mg daily if BP allows. if tolerated start hydralazine 10 mg    Nonoliguric REY on CKD resolved, at baseline   nephrology consult appreciated  Lasix Hold for now as above   io ou and regular weight checks.   avoid nephrotoxic agents     *Hypercalcemia     resolved      *IDDM controlled   on ISS      *Hypothyroidism   continue synthroid   normal TSH    Debility:   - OT/PT/PMR eval noted. [facility] vs home with home PT.     VTE proph   DNR/I pulm and pal teams seen     Reviewed w son hali plan of care over the phone 75 M acute hypercapnic respiratory failure, acute on chronic HFrEF VF s/p AICD firing, hypothyroidism, BPH CKD S3-4      Acute hypoxic respiratory failure secondary to COVID-19.   Hypercapnic respiratory failure   s/p extubation 6/14  Resolved       medically deconditioned, and plan for possible rehab, pt ot [facility]     *VF s/p AICD firing, wide complex intermittent beats most likely related to CM,  off amio  keep K and mg 4 and 2 respectively   cardio on board    *Afib rate controlled   Cont lopressor   Cont Eliquis   Keep K and Mg 4 and 2 respectively.   normal TSH     *Acute on chronic HFrEF appears compensated   EF 25-30%   Cont BB   Lasix on hold bump up in Cr   consider restarting lasix per pulm and cards team   renal team following   io uo and regular weight checks   cardio cx appreciated. start imdur 5 mg daily if BP allows. if tolerated start hydralazine 10 mg    Nonoliguric REY on CKD resolved, at baseline   nephrology consult appreciated  Lasix Hold for now as above   io ou and regular weight checks.   avoid nephrotoxic agents     *Hypercalcemia     resolved      *IDDM controlled   on ISS      *Hypothyroidism   continue synthroid   normal TSH    Debility:   - OT/PT/PMR eval noted. [facility] vs home with home PT.     VTE proph   DNR/I pulm and pal teams seen     Reviewed w son hali plan of care over the phone    Dispo: [facility] vs home with home pT> son prefers to take him home with home care. repeat COVID swab sent today

## 2020-06-23 NOTE — PROGRESS NOTE ADULT - SUBJECTIVE AND OBJECTIVE BOX
Resting    Vital Signs Last 24 Hrs  T(C): 36.9 (06-23-20 @ 05:43), Max: 36.9 (06-22-20 @ 16:17)  T(F): 98.5 (06-23-20 @ 05:43), Max: 98.5 (06-23-20 @ 05:43)  HR: 74 (06-23-20 @ 15:31) (74 - 84)  BP: 113/65 (06-23-20 @ 05:43) (113/65 - 129/88)  RR: 17 (06-23-20 @ 05:43) (15 - 17)  SpO2: 94% (06-23-20 @ 15:31) (94% - 97%)    s1s2  coarse BS  soft  sm edema                                                                                          10.4   8.94  )-----------( 311      ( 23 Jun 2020 05:45 )             35.3     23 Jun 2020 05:45    145    |  104    |  38     ----------------------------<  86     3.3     |  37     |  1.46     Ca    9.3        23 Jun 2020 05:45  Phos  2.1       23 Jun 2020 05:45  Mg     2.1       23 Jun 2020 05:45    TPro  6.8    /  Alb  2.2    /  TBili  0.8    /  DBili  x      /  AST  14     /  ALT  <6     /  AlkPhos  63     23 Jun 2020 05:45    LIVER FUNCTIONS - ( 23 Jun 2020 05:45 )  Alb: 2.2 g/dL / Pro: 6.8 g/dL / ALK PHOS: 63 U/L / ALT: <6 U/L / AST: 14 U/L / GGT: x           acetaminophen    Suspension .. 650 milliGRAM(s) Oral every 6 hours PRN  ALBUTerol    90 MICROgram(s) HFA Inhaler 2 Puff(s) Inhalation every 6 hours  apixaban 5 milliGRAM(s) Oral every 12 hours  dextrose 5% + sodium chloride 0.45%. 1000 milliLiter(s) IV Continuous <Continuous>  dextrose 5%. 1000 milliLiter(s) IV Continuous <Continuous>  dextrose 50% Injectable 12.5 Gram(s) IV Push once  dextrose 50% Injectable 25 Gram(s) IV Push once  dextrose 50% Injectable 25 Gram(s) IV Push once  insulin lispro (HumaLOG) corrective regimen sliding scale   SubCutaneous every 6 hours  ipratropium 17 MICROgram(s) HFA Inhaler 2 Puff(s) Inhalation every 6 hours  levothyroxine 25 MICROGram(s) Oral daily  metoprolol tartrate 25 milliGRAM(s) Oral two times a day  pantoprazole   Suspension 40 milliGRAM(s) Enteral Tube daily  potassium chloride    Tablet ER 40 milliEquivalent(s) Oral every 4 hours  potassium chloride   Powder 40 milliEquivalent(s) Oral every 4 hours  tamsulosin 0.4 milliGRAM(s) Oral at bedtime    A/P:    Severe CM, EF 25-30%, mod TR/AS  Adm w/resp failure, CHF/COPD exacerbation, COVID +  S/p hemodynamic REY w/peak Cr 2.19 - 6/12   Improved w/marco Cr 1.19 - 6/16  S/p rising Cr again, hemodynamic/cardio-renal REY  Renal SONO w/o hydro  On Flomax  Avoid nephrotoxins  Ct is improving  Hold Lasix  Suppl K  I/Os, daily weights  F/u BMP, Mg    [phone]

## 2020-06-23 NOTE — PROGRESS NOTE ADULT - SUBJECTIVE AND OBJECTIVE BOX
Patient is a 75y old  Male who presents with a chief complaint of Respiratory failure (22 Jun 2020 22:00)    Interval: seen at bedside. chart reviewed. reported no complaints.   no events overnight       ROS  denied fever/chills/CP/ SOB/palpitation/dizziness/abd pain/n/v/d/c/dysuria/flank pain/headaches/limb weakness/skin rash or itchiness.all other ROS neg  reviewed w nursing and ID team     Vital Signs Last 24 Hrs  T(C): 36.9 (23 Jun 2020 05:43), Max: 36.9 (22 Jun 2020 16:17)  T(F): 98.5 (23 Jun 2020 05:43), Max: 98.5 (23 Jun 2020 05:43)  HR: 74 (23 Jun 2020 15:31) (74 - 84)  BP: 113/65 (23 Jun 2020 05:43) (113/65 - 129/88)  BP(mean): --  RR: 17 (23 Jun 2020 05:43) (15 - 17)  SpO2: 94% (23 Jun 2020 15:31) (94% - 97%)    CAPILLARY BLOOD GLUCOSE      CAPILLARY BLOOD GLUCOSE      POCT Blood Glucose.: 86 mg/dL (23 Jun 2020 11:19)  POCT Blood Glucose.: 85 mg/dL (23 Jun 2020 06:39)  POCT Blood Glucose.: 71 mg/dL (22 Jun 2020 23:15)  POCT Blood Glucose.: 82 mg/dL (22 Jun 2020 17:13)    GENERAL: Not in distress. Alert    HEENT:  Normocephalic and atraumatic. PEARLA,  NECK: Supple.  No JVD.    CARDIOVASCULAR: RRR S1, S2. SM+. no rubs/gallop  LUNGS: BLAE+, no rales, no wheezing, no rhonchi.    ABDOMEN: ND. Soft,  NT, no guarding / rebound / rigidity. BS normoactive. No CVA tenderness.    EXTREMITIES: no cyanosis, no clubbing, trace edema. no leg or calf TP  SKIN: no rash. scattered bruises  NEUROLOGIC: AAO*2. global 4/5 strength. sensation intact. speech fluent  PSYCHIATRIC: Calm.  No agitation.                                      10.4   8.94  )-----------( 311      ( 23 Jun 2020 05:45 )             35.3       06-23    145  |  104  |  38<H>  ----------------------------<  86  3.3<L>   |  37<H>  |  1.46<H>    Ca    9.3      23 Jun 2020 05:45  Phos  2.1     06-23  Mg     2.1     06-23    TPro  6.8  /  Alb  2.2<L>  /  TBili  0.8  /  DBili  x   /  AST  14  /  ALT  <6<L>  /  AlkPhos  63  06-23    Thyroid Stimulating Hormone, Serum (06.12.20 @ 17:00)    Thyroid Stimulating Hormone, Serum: 2.92 uIU/mL      MEDICATIONS  (STANDING):  ALBUTerol    90 MICROgram(s) HFA Inhaler 2 Puff(s) Inhalation every 6 hours  apixaban 5 milliGRAM(s) Oral every 12 hours  dextrose 5%. 1000 milliLiter(s) (50 mL/Hr) IV Continuous <Continuous>  dextrose 50% Injectable 12.5 Gram(s) IV Push once  dextrose 50% Injectable 25 Gram(s) IV Push once  dextrose 50% Injectable 25 Gram(s) IV Push once  insulin lispro (HumaLOG) corrective regimen sliding scale   SubCutaneous every 6 hours  ipratropium 17 MICROgram(s) HFA Inhaler 2 Puff(s) Inhalation every 6 hours  levothyroxine 25 MICROGram(s) Oral daily  metoprolol tartrate 25 milliGRAM(s) Oral two times a day  pantoprazole   Suspension 40 milliGRAM(s) Enteral Tube daily  tamsulosin 0.4 milliGRAM(s) Oral at bedtime    MEDICATIONS  (PRN):  acetaminophen    Suspension .. 650 milliGRAM(s) Oral every 6 hours PRN Temp greater or equal to 38C (100.4F), Mild Pain (1 - 3)

## 2020-06-24 LAB
ALBUMIN SERPL ELPH-MCNC: 2.3 G/DL — LOW (ref 3.3–5)
ALP SERPL-CCNC: 64 U/L — SIGNIFICANT CHANGE UP (ref 40–120)
ALT FLD-CCNC: 6 U/L — LOW (ref 10–45)
ANION GAP SERPL CALC-SCNC: 8 MMOL/L — SIGNIFICANT CHANGE UP (ref 5–17)
AST SERPL-CCNC: 18 U/L — SIGNIFICANT CHANGE UP (ref 10–40)
BASOPHILS # BLD AUTO: 0.04 K/UL — SIGNIFICANT CHANGE UP (ref 0–0.2)
BASOPHILS NFR BLD AUTO: 0.4 % — SIGNIFICANT CHANGE UP (ref 0–2)
BILIRUB SERPL-MCNC: 0.8 MG/DL — SIGNIFICANT CHANGE UP (ref 0.2–1.2)
BUN SERPL-MCNC: 36 MG/DL — HIGH (ref 7–23)
CALCIUM SERPL-MCNC: 9.2 MG/DL — SIGNIFICANT CHANGE UP (ref 8.4–10.5)
CHLORIDE SERPL-SCNC: 104 MMOL/L — SIGNIFICANT CHANGE UP (ref 96–108)
CO2 SERPL-SCNC: 33 MMOL/L — HIGH (ref 22–31)
CREAT SERPL-MCNC: 1.68 MG/DL — HIGH (ref 0.5–1.3)
D DIMER BLD IA.RAPID-MCNC: 549 NG/ML DDU — HIGH
EOSINOPHIL # BLD AUTO: 0.22 K/UL — SIGNIFICANT CHANGE UP (ref 0–0.5)
EOSINOPHIL NFR BLD AUTO: 2.1 % — SIGNIFICANT CHANGE UP (ref 0–6)
FERRITIN SERPL-MCNC: 592 NG/ML — HIGH (ref 30–400)
GLUCOSE BLDC GLUCOMTR-MCNC: 109 MG/DL — HIGH (ref 70–99)
GLUCOSE BLDC GLUCOMTR-MCNC: 72 MG/DL — SIGNIFICANT CHANGE UP (ref 70–99)
GLUCOSE BLDC GLUCOMTR-MCNC: 72 MG/DL — SIGNIFICANT CHANGE UP (ref 70–99)
GLUCOSE BLDC GLUCOMTR-MCNC: 78 MG/DL — SIGNIFICANT CHANGE UP (ref 70–99)
GLUCOSE SERPL-MCNC: 84 MG/DL — SIGNIFICANT CHANGE UP (ref 70–99)
HCT VFR BLD CALC: 35.5 % — LOW (ref 39–50)
HGB BLD-MCNC: 10.8 G/DL — LOW (ref 13–17)
IMM GRANULOCYTES NFR BLD AUTO: 0.5 % — SIGNIFICANT CHANGE UP (ref 0–1.5)
LDH SERPL L TO P-CCNC: 174 U/L — SIGNIFICANT CHANGE UP (ref 50–242)
LYMPHOCYTES # BLD AUTO: 1.51 K/UL — SIGNIFICANT CHANGE UP (ref 1–3.3)
LYMPHOCYTES # BLD AUTO: 14.3 % — SIGNIFICANT CHANGE UP (ref 13–44)
MAGNESIUM SERPL-MCNC: 2 MG/DL — SIGNIFICANT CHANGE UP (ref 1.6–2.6)
MCHC RBC-ENTMCNC: 28.9 PG — SIGNIFICANT CHANGE UP (ref 27–34)
MCHC RBC-ENTMCNC: 30.4 GM/DL — LOW (ref 32–36)
MCV RBC AUTO: 94.9 FL — SIGNIFICANT CHANGE UP (ref 80–100)
MONOCYTES # BLD AUTO: 1.05 K/UL — HIGH (ref 0–0.9)
MONOCYTES NFR BLD AUTO: 9.9 % — SIGNIFICANT CHANGE UP (ref 2–14)
NEUTROPHILS # BLD AUTO: 7.7 K/UL — HIGH (ref 1.8–7.4)
NEUTROPHILS NFR BLD AUTO: 72.8 % — SIGNIFICANT CHANGE UP (ref 43–77)
NRBC # BLD: 0 /100 WBCS — SIGNIFICANT CHANGE UP (ref 0–0)
PHOSPHATE SERPL-MCNC: 2.3 MG/DL — LOW (ref 2.5–4.5)
PLATELET # BLD AUTO: 334 K/UL — SIGNIFICANT CHANGE UP (ref 150–400)
POTASSIUM SERPL-MCNC: 3.5 MMOL/L — SIGNIFICANT CHANGE UP (ref 3.5–5.3)
POTASSIUM SERPL-SCNC: 3.5 MMOL/L — SIGNIFICANT CHANGE UP (ref 3.5–5.3)
PROT SERPL-MCNC: 6.9 G/DL — SIGNIFICANT CHANGE UP (ref 6–8.3)
RBC # BLD: 3.74 M/UL — LOW (ref 4.2–5.8)
RBC # FLD: 17.5 % — HIGH (ref 10.3–14.5)
SODIUM SERPL-SCNC: 145 MMOL/L — SIGNIFICANT CHANGE UP (ref 135–145)
WBC # BLD: 10.57 K/UL — HIGH (ref 3.8–10.5)
WBC # FLD AUTO: 10.57 K/UL — HIGH (ref 3.8–10.5)

## 2020-06-24 PROCEDURE — 99233 SBSQ HOSP IP/OBS HIGH 50: CPT

## 2020-06-24 PROCEDURE — 99232 SBSQ HOSP IP/OBS MODERATE 35: CPT

## 2020-06-24 RX ORDER — ISOSORBIDE DINITRATE 5 MG/1
5 TABLET ORAL THREE TIMES A DAY
Refills: 0 | Status: DISCONTINUED | OUTPATIENT
Start: 2020-06-24 | End: 2020-06-26

## 2020-06-24 RX ORDER — HYDRALAZINE HCL 50 MG
10 TABLET ORAL
Refills: 0 | Status: DISCONTINUED | OUTPATIENT
Start: 2020-06-24 | End: 2020-06-26

## 2020-06-24 RX ORDER — SODIUM CHLORIDE 9 MG/ML
1000 INJECTION, SOLUTION INTRAVENOUS
Refills: 0 | Status: COMPLETED | OUTPATIENT
Start: 2020-06-24 | End: 2020-06-24

## 2020-06-24 RX ADMIN — Medication 25 MICROGRAM(S): at 05:04

## 2020-06-24 RX ADMIN — ALBUTEROL 2 PUFF(S): 90 AEROSOL, METERED ORAL at 04:27

## 2020-06-24 RX ADMIN — SODIUM CHLORIDE 60 MILLILITER(S): 9 INJECTION, SOLUTION INTRAVENOUS at 11:23

## 2020-06-24 RX ADMIN — APIXABAN 5 MILLIGRAM(S): 2.5 TABLET, FILM COATED ORAL at 05:04

## 2020-06-24 RX ADMIN — Medication 25 MILLIGRAM(S): at 05:04

## 2020-06-24 RX ADMIN — Medication 2 PUFF(S): at 22:00

## 2020-06-24 RX ADMIN — Medication 2 PUFF(S): at 15:24

## 2020-06-24 RX ADMIN — Medication 25 MILLIGRAM(S): at 17:21

## 2020-06-24 RX ADMIN — ALBUTEROL 2 PUFF(S): 90 AEROSOL, METERED ORAL at 10:14

## 2020-06-24 RX ADMIN — ALBUTEROL 2 PUFF(S): 90 AEROSOL, METERED ORAL at 22:00

## 2020-06-24 RX ADMIN — PANTOPRAZOLE SODIUM 40 MILLIGRAM(S): 20 TABLET, DELAYED RELEASE ORAL at 12:04

## 2020-06-24 RX ADMIN — ALBUTEROL 2 PUFF(S): 90 AEROSOL, METERED ORAL at 15:22

## 2020-06-24 RX ADMIN — Medication 2 PUFF(S): at 04:27

## 2020-06-24 RX ADMIN — ISOSORBIDE DINITRATE 5 MILLIGRAM(S): 5 TABLET ORAL at 14:25

## 2020-06-24 RX ADMIN — APIXABAN 5 MILLIGRAM(S): 2.5 TABLET, FILM COATED ORAL at 17:21

## 2020-06-24 RX ADMIN — Medication 2 PUFF(S): at 10:18

## 2020-06-24 NOTE — PROGRESS NOTE ADULT - ASSESSMENT
75 M acute hypercapnic respiratory failure, acute on chronic HFrEF; VF s/p AICD firing, hypothyroidism, BPH CKD S3-4      Acute hypoxic respiratory failure secondary to COVID-19.   Hypercapnic respiratory failure   s/p extubation 6/14    *VF s/p AICD firing, wide complex intermittent beats most likely related to CM,  off amio  on BB  keep K and mg 4 and 2 respectively   cardio on board    *Afib rate controlled   Cont lopressor , increase dose if BP allows  Cont Eliquis   Keep K and Mg 4 and 2 respectively.   normal TSH     *Acute on chronic HFrEF appears compensated   EF 25-30%   Cont BB   started on isordil 5 mg BID today 6/24. plan to start hydralazine 10 mg BID if BP ok with isordil--> increase BB if BP ok with isordil and hydralazine  Lasix on hold bump up in Cr   renal team following   io uo and regular weight checks   cardio cx appreciated.     Nonoliguric REY on CKD  nephrology consult appreciated  Lasix Hold for now as above   io ou and regular weight checks.   avoid nephrotoxic agents   small IVF today for dehydration and increased creatinine    leucocytosis: likely reactive  afebrile  hydrate  monitor    *Hypercalcemia     resolved      *IDDM controlled   on ISS      *Hypothyroidism   continue synthroid   normal TSH    Debility:   - OT/PT/PMR eval noted. [facility] vs home with home PT.   - medically deconditioned, and plan for possible rehab, pt to [facility]  but family refused. not to safe to DC home at this time. needs daily OT/PT while in house    VTE proph   DNR/I pulm and pal teams seen     Reviewed w irma lal plan of care over the phone    Dispo: [facility] if family agree. otherwise PT/OT daily for 24-48 hrs. needs PT assessment for safe DC home. needs cardiology clearance for DC. anticipates DC in next 24- 48 hrs 75 M acute hypercapnic respiratory failure, acute on chronic HFrEF; VF s/p AICD firing, hypothyroidism, BPH CKD S3-4      Acute hypoxic respiratory failure secondary to COVID-19.   Hypercapnic respiratory failure   s/p extubation 6/14    *VF s/p AICD firing, wide complex intermittent beats most likely related to CM,  off amio  on BB  keep K and mg 4 and 2 respectively   cardio on board    *Afib rate controlled   Cont lopressor , increase dose if BP allows  Cont Eliquis   Keep K and Mg 4 and 2 respectively.   normal TSH     *Acute on chronic HFrEF appears compensated   EF 25-30%   Cont BB   started on isordil 5 mg BID today 6/24. plan to start hydralazine 10 mg BID if BP ok with isordil--> increase BB if BP ok with isordil and hydralazine  Lasix on hold bump up in Cr   renal team following   io uo and regular weight checks   cardio cx appreciated.     Nonoliguric REY on CKD  nephrology consult appreciated  Lasix Hold for now as above   io ou and regular weight checks.   avoid nephrotoxic agents   small IVF today for dehydration and increased creatinine    leucocytosis: likely reactive  afebrile  hydrate  monitor    *Hypercalcemia     resolved      *IDDM controlled   on ISS      *Hypothyroidism   continue synthroid   normal TSH    Sacral decubitus:   wound cx was called.     Debility:   - OT/PT/PMR eval noted. [facility] vs home with home PT.   - medically deconditioned, and plan for possible rehab, pt to [facility]  but family refused. not to safe to DC home at this time. needs daily OT/PT while in house    VTE proph   DNR/I pulm and pal teams seen     Reviewed w irma lal plan of care over the phone    Dispo: [facility] if family agree. otherwise PT/OT daily for 24-48 hrs. needs PT assessment for safe DC home. needs cardiology clearance for DC.    addendum: spoke to Dr. macias. hydralazine was added to regimen. cardiology suggested discussion with family regarding hospice as prognosis is guarded and patient is at high risk of arrythmia and SCD. palliative team was informed. patient is not a candidate for acute rehab as per PM&R.

## 2020-06-24 NOTE — PROGRESS NOTE ADULT - SUBJECTIVE AND OBJECTIVE BOX
Patient is a 75y old  Male who presents with a chief complaint of Respiratory failure (22 Jun 2020 22:00)    Interval: seen at bedside. chart reviewed. reported no complaints.   no events overnight   looks very dry with REY.   small IVF started.   covid neg 6/23. repeat pending,   family wants to visit. refused SPIKE. needs OT/PT. not safe to DC home now  cardiac meds being adjusted according to BP response  not eating or drinking well. creatinine bumped up    ROS  denied fever/chills/CP/ SOB/palpitation/dizziness/abd pain/n/v/d/c/dysuria/flank pain/headaches/limb weakness/skin rash or itchiness.all other ROS neg  reviewed w nursing and ID team     ICU Vital Signs Last 24 Hrs  T(C): 37.1 (24 Jun 2020 06:55), Max: 37.1 (24 Jun 2020 06:55)  T(F): 98.7 (24 Jun 2020 06:55), Max: 98.7 (24 Jun 2020 06:55)  HR: 84 (24 Jun 2020 06:55) (74 - 84)  BP: 134/80 (24 Jun 2020 06:55) (118/70 - 142/85)  BP(mean): --  ABP: --  ABP(mean): --  RR: 16 (24 Jun 2020 06:55) (16 - 17)  SpO2: 96% (24 Jun 2020 10:14) (94% - 98%)      GENERAL: Not in distress. Alert    HEENT:  Normocephalic and atraumatic. MM very dry  NECK: Supple.  No JVD.    CARDIOVASCULAR: RRR S1, S2. SM+. no rubs/gallop  LUNGS: BLAE+, no rales, no wheezing, no rhonchi.    ABDOMEN: ND. Soft,  NT, no guarding / rebound / rigidity. BS normoactive. No CVA tenderness.    EXTREMITIES: no cyanosis, no clubbing, trace edema. no leg or calf TP/ flexion deformity b/l fingers with some OA changes and reported tenderness while trying to extend right fingers  SKIN: no rash. scattered bruises  NEUROLOGIC: AAO*2. global 4/5 strength. sensation intact. speech fluent  PSYCHIATRIC: Calm.  No agitation.                              10.8   10.57 )-----------( 334      ( 24 Jun 2020 05:55 )             35.5       06-24    145  |  104  |  36<H>  ----------------------------<  84  3.5   |  33<H>  |  1.68<H>    Ca    9.2      24 Jun 2020 05:55  Phos  2.3     06-24  Mg     2.0     06-24    TPro  6.9  /  Alb  2.3<L>  /  TBili  0.8  /  DBili  x   /  AST  18  /  ALT  6<L>  /  AlkPhos  64  06-24      Thyroid Stimulating Hormone, Serum (06.12.20 @ 17:00)    Thyroid Stimulating Hormone, Serum: 2.92 uIU/mL    MEDICATIONS  (STANDING):  ALBUTerol    90 MICROgram(s) HFA Inhaler 2 Puff(s) Inhalation every 6 hours  apixaban 5 milliGRAM(s) Oral every 12 hours  dextrose 5% + sodium chloride 0.45%. 1000 milliLiter(s) (60 mL/Hr) IV Continuous <Continuous>  dextrose 5%. 1000 milliLiter(s) (50 mL/Hr) IV Continuous <Continuous>  dextrose 50% Injectable 12.5 Gram(s) IV Push once  dextrose 50% Injectable 25 Gram(s) IV Push once  dextrose 50% Injectable 25 Gram(s) IV Push once  insulin lispro (HumaLOG) corrective regimen sliding scale   SubCutaneous every 6 hours  ipratropium 17 MICROgram(s) HFA Inhaler 2 Puff(s) Inhalation every 6 hours  isosorbide   dinitrate Tablet (ISORDIL) 5 milliGRAM(s) Oral three times a day  levothyroxine 25 MICROGram(s) Oral daily  metoprolol tartrate 25 milliGRAM(s) Oral two times a day  pantoprazole   Suspension 40 milliGRAM(s) Enteral Tube daily  tamsulosin 0.4 milliGRAM(s) Oral at bedtime    MEDICATIONS  (PRN):  acetaminophen    Suspension .. 650 milliGRAM(s) Oral every 6 hours PRN Temp greater or equal to 38C (100.4F), Mild Pain (1 - 3)

## 2020-06-24 NOTE — PROGRESS NOTE ADULT - SUBJECTIVE AND OBJECTIVE BOX
SUBJ:  Patient is a 75y old  Male who presents with a chief complaint of Respiratory failure (24 Jun 2020 12:48)  asked by Dr. Perez to evaluate patient being considered for discharge   he is in bed... no cardiopulmonary distress... no further firing of ICD       PAST MEDICAL & SURGICAL HISTORY:  GERD (gastroesophageal reflux disease)  Chronic kidney disease (CKD): Baseline creatinine 1.6  Type 2 diabetes mellitus  Heart failure, systolic and diastolic  Sciatica  Atrial fibrillation: Status post cardioversion  HTN (hypertension)  Neuropathy  Hypothyroid  COVID-19  Acute kidney failure  Artificial pacemaker      MEDICATIONS  (STANDING):  ALBUTerol    90 MICROgram(s) HFA Inhaler 2 Puff(s) Inhalation every 6 hours  apixaban 5 milliGRAM(s) Oral every 12 hours  dextrose 5%. 1000 milliLiter(s) (50 mL/Hr) IV Continuous <Continuous>  dextrose 50% Injectable 12.5 Gram(s) IV Push once  dextrose 50% Injectable 25 Gram(s) IV Push once  dextrose 50% Injectable 25 Gram(s) IV Push once  insulin lispro (HumaLOG) corrective regimen sliding scale   SubCutaneous every 6 hours  ipratropium 17 MICROgram(s) HFA Inhaler 2 Puff(s) Inhalation every 6 hours  isosorbide   dinitrate Tablet (ISORDIL) 5 milliGRAM(s) Oral three times a day  levothyroxine 25 MICROGram(s) Oral daily  metoprolol tartrate 25 milliGRAM(s) Oral two times a day  pantoprazole   Suspension 40 milliGRAM(s) Enteral Tube daily  tamsulosin 0.4 milliGRAM(s) Oral at bedtime    MEDICATIONS  (PRN):  acetaminophen    Suspension .. 650 milliGRAM(s) Oral every 6 hours PRN Temp greater or equal to 38C (100.4F), Mild Pain (1 - 3)          Vital Signs Last 24 Hrs  T(C): 37.1 (24 Jun 2020 06:55), Max: 37.1 (24 Jun 2020 06:55)  T(F): 98.7 (24 Jun 2020 06:55), Max: 98.7 (24 Jun 2020 06:55)  HR: 84 (24 Jun 2020 06:55) (74 - 84)  BP: 134/80 (24 Jun 2020 06:55) (118/70 - 142/85)  BP(mean): --  RR: 16 (24 Jun 2020 06:55) (16 - 17)  SpO2: 96% (24 Jun 2020 10:14) (94% - 98%)    REVIEW OF SYSTEMS:  CONSTITUTIONAL: fatigue  RESPIRATORY: No cough, wheezing, chills or hemoptysis; No shortness of breath  CARDIOVASCULAR: No chest pain or chest pressure.  No shortness of breath or dyspnea on exertion.  No palpitations, dizziness, light headedness, syncope or near syncope.  No edema, no orthopnea.       PHYSICAL EXAM  Constitutional:  WDWN. No acute distress  HEENT: normocephalic, atraumatic.  PERRLA. EOMI  Neck : No JVD. no carotid bruits  Lungs:  clear to auscultation bilaterally. no rhonchi. no wheezing  Heart:  S1 and S2. No S3, S4. I/VI systolic murmur.  Abdomen:  soft, non tender.  Extremities: No clubbing, cyanoisis or edema  Nuerologic:  A+O x 3. No focal deficits  Skin:  no rashes        LABS:                        10.8   10.57 )-----------( 334      ( 24 Jun 2020 05:55 )             35.5     06-24    145  |  104  |  36<H>  ----------------------------<  84  3.5   |  33<H>  |  1.68<H>    Ca    9.2      24 Jun 2020 05:55  Phos  2.3     06-24  Mg     2.0     06-24    TPro  6.9  /  Alb  2.3<L>  /  TBili  0.8  /  DBili  x   /  AST  18  /  ALT  6<L>  /  AlkPhos  64  06-24            I&O's Summary    23 Jun 2020 07:01  -  24 Jun 2020 07:00  --------------------------------------------------------  IN: 660 mL / OUT: 0 mL / NET: 660 mL    < from: 12 Lead ECG (06.22.20 @ 09:54) >  Ventricular-paced rhythm  Biventricular pacemaker detected    When compared with ECG of 21-JUN-2020 09:27,  Vent. rate has decreased BY   8 BPM    < end of copied text >    < from: TTE Echo Complete w/o Contrast w/ Doppler (06.12.20 @ 13:01) >   1. Left ventricular ejection fraction, by visual estimation, is 25 to 30%.   2. Severely decreased global left ventricular systolic function.   3. Mildly increased LV wall thickness.   4. Spectral Doppler shows restrictive pattern of left ventricular myocardial filling (Grade III diastolic dysfunction).   5. There is no evidence of pericardial effusion.   6. Thickening and calcification of the anterior and posterior mitral valve leaflets.   7. Moderate tricuspid regurgitation.   8. Mild aortic regurgitation.   9. Moderate aortic valve stenosis.  10. Structurally normalpulmonic valve, with normal leaflet excursion.  11. Dilatation of the ascending aorta.  12. Estimated pulmonary artery systolic pressure is 63.4 mmHg assuming a right atrial pressure of 10 mmHg, which is consistent with moderate pulmonary hypertension.  13. Peak transaortic gradient equals 11.2 mmHg, mean transaortic gradient equals 6.0 mmHg, the calculated aortic valve area equals 1.37 cm² by the continuity equation consistent with moderate aortic stenosis.    < end of copied text >

## 2020-06-24 NOTE — PROGRESS NOTE ADULT - ASSESSMENT
75 year old man with multiple medical problems... s/p respiratory failure, extubated, initially COVID+, now negative.  s/p firing of ICD 6/17/20... no further recurrences.  He has severe cardiomyopathy, CHF AF. CKD. Debility    Recommendations  - consider changing to long acting beta blocker... ie metoprolol succinate 50 mg daily  - continue hydralazine and nitrates  - unable to use ACE-I/ARB or ARNI due to renal insufficiency   - continue anticoagulation  - current cardiac status appears to be stable... though due to advanced heart disease and debility, he is at risk for recurrent arrhythmia and CHF... he should followup with his primary cardiologist Dr. Pelletier post discharge     discussed with patient   message left to discuss with Medicine team

## 2020-06-24 NOTE — PROGRESS NOTE ADULT - SUBJECTIVE AND OBJECTIVE BOX
Resting    Vital Signs Last 24 Hrs  T(C): 37.1 (06-24-20 @ 06:55), Max: 37.1 (06-24-20 @ 06:55)  T(F): 98.7 (06-24-20 @ 06:55), Max: 98.7 (06-24-20 @ 06:55)  HR: 84 (06-24-20 @ 06:55) (74 - 84)  BP: 134/80 (06-24-20 @ 06:55) (118/70 - 142/85)  RR: 16 (06-24-20 @ 06:55) (16 - 17)  SpO2: 96% (06-24-20 @ 10:14) (94% - 98%)    s1s2  coarse BS  soft  sm edema                                                                                                10.8   10.57 )-----------( 334      ( 24 Jun 2020 05:55 )             35.5     24 Jun 2020 05:55    145    |  104    |  36     ----------------------------<  84     3.5     |  33     |  1.68     Ca    9.2        24 Jun 2020 05:55  Phos  2.3       24 Jun 2020 05:55  Mg     2.0       24 Jun 2020 05:55    TPro  6.9    /  Alb  2.3    /  TBili  0.8    /  DBili  x      /  AST  18     /  ALT  6      /  AlkPhos  64     24 Jun 2020 05:55    LIVER FUNCTIONS - ( 24 Jun 2020 05:55 )  Alb: 2.3 g/dL / Pro: 6.9 g/dL / ALK PHOS: 64 U/L / ALT: 6 U/L / AST: 18 U/L / GGT: x           acetaminophen    Suspension .. 650 milliGRAM(s) Oral every 6 hours PRN  ALBUTerol    90 MICROgram(s) HFA Inhaler 2 Puff(s) Inhalation every 6 hours  apixaban 5 milliGRAM(s) Oral every 12 hours  dextrose 5%. 1000 milliLiter(s) IV Continuous <Continuous>  dextrose 50% Injectable 12.5 Gram(s) IV Push once  dextrose 50% Injectable 25 Gram(s) IV Push once  dextrose 50% Injectable 25 Gram(s) IV Push once  insulin lispro (HumaLOG) corrective regimen sliding scale   SubCutaneous every 6 hours  ipratropium 17 MICROgram(s) HFA Inhaler 2 Puff(s) Inhalation every 6 hours  isosorbide   dinitrate Tablet (ISORDIL) 5 milliGRAM(s) Oral three times a day  levothyroxine 25 MICROGram(s) Oral daily  metoprolol tartrate 25 milliGRAM(s) Oral two times a day  pantoprazole   Suspension 40 milliGRAM(s) Enteral Tube daily  tamsulosin 0.4 milliGRAM(s) Oral at bedtime    A/P:    Severe CM, EF 25-30%, mod TR/AS  Adm w/resp failure, CHF/COPD exacerbation, COVID +  S/p hemodynamic REY w/peak Cr 2.19 - 6/12   Improved w/marco Cr 1.19 - 6/16  S/p rising Cr again, hemodynamic/cardio-renal REY  Renal SONO w/o hydro  On Flomax  Avoid nephrotoxins  Ct is fairly stable  Hold Lasix  I/Os, daily weights  F/u BMP, Mg    [phone]

## 2020-06-24 NOTE — PROGRESS NOTE ADULT - ASSESSMENT
A/P  ·		  75 M acute hypercapnic respiratory failure, acute on chronic HFrEF; VF s/p AICD firing, hypothyroidism, BPH CKD S3-4      Acute hypoxic respiratory failure secondary to COVID-19.   Hypercapnic respiratory failure   s/p extubation 6/14    *VF s/p AICD firing, wide complex intermittent beats most likely related to CM,  off amio  on BB  keep K and mg 4 and 2 respectively   cardio on board    *Afib rate controlled   Cont lopressor , increase dose if BP allows  Cont Eliquis   Keep K and Mg 4 and 2 respectively.   normal TSH     *Acute on chronic HFrEF appears compensated   EF 25-30%   Cont BB   started on isordil 5 mg BID today 6/24. plan to start hydralazine 10 mg BID if BP ok with isordil--> increase BB if BP ok with isordil and hydralazine  Lasix on hold bump up in Cr   renal team following   io uo and regular weight checks   cardio cx appreciated.     Nonoliguric REY on CKD  nephrology consult appreciated  Lasix Hold for now as above   io ou and regular weight checks.   avoid nephrotoxic agents   small IVF today for dehydration and increased creatinine    leucocytosis: likely reactive  afebrile  hydrate  monitor    *Hypercalcemia     resolved    Debility:   - OT/PT/PMR eval noted. GE[facility] vs home with home PT.   - medically deconditioned, and plan for possible rehab, pt to [facility]  but family refused. not to safe to DC home at this time. needs daily OT/PT while in house.    Palliative :  pt awake, this Am, talking short sentences, appears very weak. Appetite poor, pt says he does not like the  food. Case d/w med team, Dr Perez and cm.  They are starting d/c planning. Pt covid Neg. 6/23, repeat done today is pending. I called and spoke to son Jose, discussed pts  present condition, son says he was also updated by dr perez today . Son asked for AR eval and pt was not accepted. We then discussed possible [facility] with son Jose, but he does not want that due to their policies of quarantining pts for 2 weeks . Son feels this will  not be helpful for his dad. He wants to take him home. We reviewed options of home with home  care vs home with hospice services when pt is medically cleared for d/c.    Son will need to discuss with his family, and his mother. Gave Raúl my contact info for questions. Reviewed this with PEDRO LUIS.

## 2020-06-24 NOTE — PROGRESS NOTE ADULT - SUBJECTIVE AND OBJECTIVE BOX
Progress: pt awake this AM, speaking short sentences, appears very weakened. Did not want to eat , says does not like the food.     Present Symptoms:   Dyspnea: mild  Nausea/Vomiting:   Anxiety:    Depressed Mood:   Fatigue: yes  Loss of appetite: yes  Pain:  no                  location:   Review of Systems: [Unable to obtain due to poor mentation/fatigue    MEDICATIONS  (STANDING):  ALBUTerol    90 MICROgram(s) HFA Inhaler 2 Puff(s) Inhalation every 6 hours  apixaban 5 milliGRAM(s) Oral every 12 hours  dextrose 5%. 1000 milliLiter(s) (50 mL/Hr) IV Continuous <Continuous>  dextrose 50% Injectable 12.5 Gram(s) IV Push once  dextrose 50% Injectable 25 Gram(s) IV Push once  dextrose 50% Injectable 25 Gram(s) IV Push once  hydrALAZINE 10 milliGRAM(s) Oral two times a day  insulin lispro (HumaLOG) corrective regimen sliding scale   SubCutaneous every 6 hours  ipratropium 17 MICROgram(s) HFA Inhaler 2 Puff(s) Inhalation every 6 hours  isosorbide   dinitrate Tablet (ISORDIL) 5 milliGRAM(s) Oral three times a day  levothyroxine 25 MICROGram(s) Oral daily  metoprolol tartrate 25 milliGRAM(s) Oral two times a day  pantoprazole   Suspension 40 milliGRAM(s) Enteral Tube daily  tamsulosin 0.4 milliGRAM(s) Oral at bedtime    MEDICATIONS  (PRN):  acetaminophen    Suspension .. 650 milliGRAM(s) Oral every 6 hours PRN Temp greater or equal to 38C (100.4F), Mild Pain (1 - 3)      PHYSICAL EXAM:  Vital Signs Last 24 Hrs  T(C): 36.6 (24 Jun 2020 15:49), Max: 37.1 (24 Jun 2020 06:55)  T(F): 97.9 (24 Jun 2020 15:49), Max: 98.7 (24 Jun 2020 06:55)  HR: 79 (24 Jun 2020 15:49) (79 - 84)  BP: 112/62 (24 Jun 2020 15:49) (112/62 - 142/85)  BP(mean): --  RR: 15 (24 Jun 2020 15:49) (15 - 17)  SpO2: 99% (24 Jun 2020 15:49) (96% - 99%)  General: alert  oriented x _2-3___      HEENT: normal  , mouth dry   Lungs: dim to bases   CV: s1s2 ir    GI: abd non tender     Musculoskeletal: weakened x4    Skin: warm dry     Neuro: alert, oriented 2-3   Oral intake ability:  oral feeding min , needs full  assist   Diet: pureed /soft, as barbara      LABS:                          10.8   10.57 )-----------( 334      ( 24 Jun 2020 05:55 )             35.5     06-24    145  |  104  |  36<H>  ----------------------------<  84  3.5   |  33<H>  |  1.68<H>    Ca    9.2      24 Jun 2020 05:55  Phos  2.3     06-24  Mg     2.0     06-24    TPro  6.9  /  Alb  2.3<L>  /  TBili  0.8  /  DBili  x   /  AST  18  /  ALT  6<L>  /  AlkPhos  64  06-24        RADIOLOGY & ADDITIONAL STUDIES:    ADVANCE DIRECTIVES:  MOLST DNR/DNI   Advanced Care Planning discussion total time spent:

## 2020-06-25 LAB
ALBUMIN SERPL ELPH-MCNC: 2.2 G/DL — LOW (ref 3.3–5)
ALP SERPL-CCNC: 63 U/L — SIGNIFICANT CHANGE UP (ref 40–120)
ALT FLD-CCNC: 6 U/L — LOW (ref 10–45)
ANION GAP SERPL CALC-SCNC: 8 MMOL/L — SIGNIFICANT CHANGE UP (ref 5–17)
AST SERPL-CCNC: 16 U/L — SIGNIFICANT CHANGE UP (ref 10–40)
BASOPHILS # BLD AUTO: 0.04 K/UL — SIGNIFICANT CHANGE UP (ref 0–0.2)
BASOPHILS NFR BLD AUTO: 0.4 % — SIGNIFICANT CHANGE UP (ref 0–2)
BILIRUB SERPL-MCNC: 0.7 MG/DL — SIGNIFICANT CHANGE UP (ref 0.2–1.2)
BUN SERPL-MCNC: 36 MG/DL — HIGH (ref 7–23)
CALCIUM SERPL-MCNC: 8.9 MG/DL — SIGNIFICANT CHANGE UP (ref 8.4–10.5)
CHLORIDE SERPL-SCNC: 105 MMOL/L — SIGNIFICANT CHANGE UP (ref 96–108)
CO2 SERPL-SCNC: 33 MMOL/L — HIGH (ref 22–31)
CREAT SERPL-MCNC: 1.66 MG/DL — HIGH (ref 0.5–1.3)
EOSINOPHIL # BLD AUTO: 0.41 K/UL — SIGNIFICANT CHANGE UP (ref 0–0.5)
EOSINOPHIL NFR BLD AUTO: 4.4 % — SIGNIFICANT CHANGE UP (ref 0–6)
GLUCOSE BLDC GLUCOMTR-MCNC: 76 MG/DL — SIGNIFICANT CHANGE UP (ref 70–99)
GLUCOSE BLDC GLUCOMTR-MCNC: 77 MG/DL — SIGNIFICANT CHANGE UP (ref 70–99)
GLUCOSE BLDC GLUCOMTR-MCNC: 88 MG/DL — SIGNIFICANT CHANGE UP (ref 70–99)
GLUCOSE BLDC GLUCOMTR-MCNC: 98 MG/DL — SIGNIFICANT CHANGE UP (ref 70–99)
GLUCOSE SERPL-MCNC: 102 MG/DL — HIGH (ref 70–99)
HCT VFR BLD CALC: 33.1 % — LOW (ref 39–50)
HGB BLD-MCNC: 9.8 G/DL — LOW (ref 13–17)
IMM GRANULOCYTES NFR BLD AUTO: 0.5 % — SIGNIFICANT CHANGE UP (ref 0–1.5)
LYMPHOCYTES # BLD AUTO: 1.9 K/UL — SIGNIFICANT CHANGE UP (ref 1–3.3)
LYMPHOCYTES # BLD AUTO: 20.5 % — SIGNIFICANT CHANGE UP (ref 13–44)
MAGNESIUM SERPL-MCNC: 2 MG/DL — SIGNIFICANT CHANGE UP (ref 1.6–2.6)
MCHC RBC-ENTMCNC: 28.2 PG — SIGNIFICANT CHANGE UP (ref 27–34)
MCHC RBC-ENTMCNC: 29.6 GM/DL — LOW (ref 32–36)
MCV RBC AUTO: 95.4 FL — SIGNIFICANT CHANGE UP (ref 80–100)
MONOCYTES # BLD AUTO: 0.97 K/UL — HIGH (ref 0–0.9)
MONOCYTES NFR BLD AUTO: 10.5 % — SIGNIFICANT CHANGE UP (ref 2–14)
NEUTROPHILS # BLD AUTO: 5.88 K/UL — SIGNIFICANT CHANGE UP (ref 1.8–7.4)
NEUTROPHILS NFR BLD AUTO: 63.7 % — SIGNIFICANT CHANGE UP (ref 43–77)
NRBC # BLD: 0 /100 WBCS — SIGNIFICANT CHANGE UP (ref 0–0)
PHOSPHATE SERPL-MCNC: 2.4 MG/DL — LOW (ref 2.5–4.5)
PLATELET # BLD AUTO: 328 K/UL — SIGNIFICANT CHANGE UP (ref 150–400)
POTASSIUM SERPL-MCNC: 3.2 MMOL/L — LOW (ref 3.5–5.3)
POTASSIUM SERPL-SCNC: 3.2 MMOL/L — LOW (ref 3.5–5.3)
PROT SERPL-MCNC: 6.6 G/DL — SIGNIFICANT CHANGE UP (ref 6–8.3)
RBC # BLD: 3.47 M/UL — LOW (ref 4.2–5.8)
RBC # FLD: 17.6 % — HIGH (ref 10.3–14.5)
SARS-COV-2 RNA SPEC QL NAA+PROBE: SIGNIFICANT CHANGE UP
SODIUM SERPL-SCNC: 146 MMOL/L — HIGH (ref 135–145)
URATE SERPL-MCNC: 9.3 MG/DL — HIGH (ref 3.4–8.8)
WBC # BLD: 9.25 K/UL — SIGNIFICANT CHANGE UP (ref 3.8–10.5)
WBC # FLD AUTO: 9.25 K/UL — SIGNIFICANT CHANGE UP (ref 3.8–10.5)

## 2020-06-25 PROCEDURE — 99233 SBSQ HOSP IP/OBS HIGH 50: CPT | Mod: GC

## 2020-06-25 PROCEDURE — 99232 SBSQ HOSP IP/OBS MODERATE 35: CPT | Mod: GC

## 2020-06-25 PROCEDURE — 99223 1ST HOSP IP/OBS HIGH 75: CPT

## 2020-06-25 RX ORDER — METOPROLOL TARTRATE 50 MG
50 TABLET ORAL DAILY
Refills: 0 | Status: DISCONTINUED | OUTPATIENT
Start: 2020-06-26 | End: 2020-06-26

## 2020-06-25 RX ORDER — POTASSIUM CHLORIDE 20 MEQ
10 PACKET (EA) ORAL
Refills: 0 | Status: COMPLETED | OUTPATIENT
Start: 2020-06-25 | End: 2020-06-25

## 2020-06-25 RX ORDER — METOPROLOL TARTRATE 50 MG
25 TABLET ORAL ONCE
Refills: 0 | Status: COMPLETED | OUTPATIENT
Start: 2020-06-25 | End: 2020-06-25

## 2020-06-25 RX ORDER — ASCORBIC ACID 60 MG
500 TABLET,CHEWABLE ORAL
Refills: 0 | Status: DISCONTINUED | OUTPATIENT
Start: 2020-06-25 | End: 2020-06-26

## 2020-06-25 RX ADMIN — Medication 10 MILLIGRAM(S): at 05:49

## 2020-06-25 RX ADMIN — Medication 2 PUFF(S): at 11:18

## 2020-06-25 RX ADMIN — Medication 2 PUFF(S): at 16:25

## 2020-06-25 RX ADMIN — Medication 100 MILLIEQUIVALENT(S): at 14:15

## 2020-06-25 RX ADMIN — ISOSORBIDE DINITRATE 5 MILLIGRAM(S): 5 TABLET ORAL at 14:15

## 2020-06-25 RX ADMIN — APIXABAN 5 MILLIGRAM(S): 2.5 TABLET, FILM COATED ORAL at 17:22

## 2020-06-25 RX ADMIN — Medication 100 MILLIEQUIVALENT(S): at 15:21

## 2020-06-25 RX ADMIN — Medication 2 PUFF(S): at 22:01

## 2020-06-25 RX ADMIN — TAMSULOSIN HYDROCHLORIDE 0.4 MILLIGRAM(S): 0.4 CAPSULE ORAL at 22:26

## 2020-06-25 RX ADMIN — ALBUTEROL 2 PUFF(S): 90 AEROSOL, METERED ORAL at 04:25

## 2020-06-25 RX ADMIN — Medication 500 MILLIGRAM(S): at 17:22

## 2020-06-25 RX ADMIN — PANTOPRAZOLE SODIUM 40 MILLIGRAM(S): 20 TABLET, DELAYED RELEASE ORAL at 12:19

## 2020-06-25 RX ADMIN — Medication 2 PUFF(S): at 04:25

## 2020-06-25 RX ADMIN — ISOSORBIDE DINITRATE 5 MILLIGRAM(S): 5 TABLET ORAL at 22:26

## 2020-06-25 RX ADMIN — Medication 25 MILLIGRAM(S): at 17:22

## 2020-06-25 RX ADMIN — ALBUTEROL 2 PUFF(S): 90 AEROSOL, METERED ORAL at 11:17

## 2020-06-25 RX ADMIN — Medication 1 TABLET(S): at 12:19

## 2020-06-25 RX ADMIN — Medication 25 MILLIGRAM(S): at 05:48

## 2020-06-25 RX ADMIN — ALBUTEROL 2 PUFF(S): 90 AEROSOL, METERED ORAL at 16:23

## 2020-06-25 RX ADMIN — Medication 25 MICROGRAM(S): at 05:49

## 2020-06-25 RX ADMIN — ISOSORBIDE DINITRATE 5 MILLIGRAM(S): 5 TABLET ORAL at 05:48

## 2020-06-25 RX ADMIN — Medication 10 MILLIGRAM(S): at 17:22

## 2020-06-25 RX ADMIN — ALBUTEROL 2 PUFF(S): 90 AEROSOL, METERED ORAL at 22:00

## 2020-06-25 RX ADMIN — APIXABAN 5 MILLIGRAM(S): 2.5 TABLET, FILM COATED ORAL at 05:48

## 2020-06-25 RX ADMIN — Medication 100 MILLIEQUIVALENT(S): at 12:48

## 2020-06-25 NOTE — PROGRESS NOTE ADULT - SUBJECTIVE AND OBJECTIVE BOX
Patient is a 75y old  Male who presents with a chief complaint of Respiratory failure (22 Jun 2020 15:36)      HPI:  75 year old male with extensive PMH including CAD, systolic/diastolic heart failure status post AICD, atrial fibrillation status post cardioversion, COPD, type 2 DM, CKD, hypothyroidism who presented to [facility] from [facility] with altered mental status.  ABG revealed acute hypercarbic respiratory failure and patient was intubated, COVID positive.  Transferred to ICU for further care.    s/p extubation 6/14  being followed by cards, palliative   asked to reassess for rehab needs  complains of weakness, fatigue, poor appetite.     REVIEW OF SYSTEMS: No chest pain, shortness of breath, nausea, vomiting or diarhea.          CURRENT FUNCTIONAL STATUS: max a         ----------------------------------------------------------------------------------------  PHYSICAL EXAM  Constitutional - NAD, Comfortable  HEENT - NCAT, EOMI  Neck - Supple, No limited ROM  Chest - CTA bilaterally, No wheeze, No rhonchi, No crackles  Cardiovascular - RRR, S1S2, No murmurs  Abdomen - BS+, Soft, NTND  Extremities - No C/C/E, No calf tenderness   Neurologic Exam -                    Cognitive - Awake, Alert, AAO to self, place     Communication - hypophonic         Motor -3/5 throughout                       Sensory - Intact to LT     Reflexes - DTR Intact, No primitive reflexive     Balance - poor sitting   Psychiatric - Mood stable, Affect WNL    Vital Signs Last 24 Hrs  T(C): 36.7 (25 Jun 2020 16:41), Max: 36.9 (24 Jun 2020 22:00)  T(F): 98 (25 Jun 2020 16:41), Max: 98.5 (24 Jun 2020 22:00)  HR: 80 (25 Jun 2020 16:41) (77 - 84)  BP: 122/69 (25 Jun 2020 16:41) (122/69 - 142/79)  BP(mean): --  RR: 16 (25 Jun 2020 05:00) (16 - 16)  SpO2: 98% (25 Jun 2020 16:23) (98% - 100%)    RECENT LABS/IMAGING                        9.8    9.25  )-----------( 328      ( 25 Jun 2020 06:30 )             33.1     06-25    146<H>  |  105  |  36<H>  ----------------------------<  102<H>  3.2<L>   |  33<H>  |  1.66<H>    Ca    8.9      25 Jun 2020 06:30  Phos  2.4     06-25  Mg     2.0     06-25    TPro  6.6  /  Alb  2.2<L>  /  TBili  0.7  /  DBili  x   /  AST  16  /  ALT  6<L>  /  AlkPhos  63  06-25

## 2020-06-25 NOTE — PROGRESS NOTE ADULT - ASSESSMENT
74 yo male with respiratory failure, debility  1. PT- bed mobility,transfers, gait and balance training  2. OT- ADL'S  3.  Patient would benefit from subacute rehab vs home with HHA, his function and poor endurance make him not a candidate for acute rehab

## 2020-06-25 NOTE — CONSULT NOTE ADULT - SUBJECTIVE AND OBJECTIVE BOX
This is a 75 year old male with extensive PMH including CAD, systolic/diastolic heart failure status post AICD, atrial fibrillation status post cardioversion, COPD, type 2 DM, CKD, hypothyroidism who presented to [facility] from [facility] with altered mental status.  ABG revealed acute hypercarbic respiratory failure and patient was intubated, COVID positive.  Transferred to ICU for further care on June 12th. Patient extubated on June 14th. Patient stabilized and moved to med/surg unit on the 17th.   Per chart, unstagable PU documented on admission to ICU and stage 2 PU to upper back.   Wound care consulted to evaluate wounds and make recommendations. From chart review wound care consult placed 6/20, however no call placed to this provider. On 6/24, call received from hospitalist for consult.     PAST MEDICAL & SURGICAL HISTORY:  GERD (gastroesophageal reflux disease)  Chronic kidney disease (CKD): Baseline creatinine 1.6  Type 2 diabetes mellitus  Heart failure, systolic and diastolic  Sciatica  Atrial fibrillation: Status post cardioversion  HTN (hypertension)  Neuropathy  Hypothyroid  COVID-19  Acute kidney failure  Artificial pacemaker    Allergies  No Known Allergies      Social Hx: denies ETOH, tobacco and illicit drug use     Family Hx: n/c       ROS: patient states that he has some pain at the wound. Unaware when wound developed. Denies any cardio, pulm, GI or  complaints. Per nursing, diapered due to incontinence.   Patient does not turn self in bed and per nursing, has not had a good appetite.     06-25    146<H>  |  105  |  36<H>  ----------------------------<  102<H>  3.2<L>   |  33<H>  |  1.66<H>    Ca    8.9      25 Jun 2020 06:30  Phos  2.4     06-25  Mg     2.0     06-25    TPro  6.6  /  Alb  2.2<L>  /  TBili  0.7  /  DBili  x   /  AST  16  /  ALT  6<L>  /  AlkPhos  63  06-25                        9.8    9.25  )-----------( 328      ( 25 Jun 2020 06:30 )             33.1   MEDICATIONS  (STANDING):  ALBUTerol    90 MICROgram(s) HFA Inhaler 2 Puff(s) Inhalation every 6 hours  apixaban 5 milliGRAM(s) Oral every 12 hours  dextrose 5%. 1000 milliLiter(s) (50 mL/Hr) IV Continuous <Continuous>  dextrose 50% Injectable 12.5 Gram(s) IV Push once  dextrose 50% Injectable 25 Gram(s) IV Push once  dextrose 50% Injectable 25 Gram(s) IV Push once  hydrALAZINE 10 milliGRAM(s) Oral two times a day  insulin lispro (HumaLOG) corrective regimen sliding scale   SubCutaneous every 6 hours  ipratropium 17 MICROgram(s) HFA Inhaler 2 Puff(s) Inhalation every 6 hours  isosorbide   dinitrate Tablet (ISORDIL) 5 milliGRAM(s) Oral three times a day  levothyroxine 25 MICROGram(s) Oral daily  metoprolol tartrate 25 milliGRAM(s) Oral two times a day  pantoprazole   Suspension 40 milliGRAM(s) Enteral Tube daily  tamsulosin 0.4 milliGRAM(s) Oral at bedtime    MEDICATIONS  (PRN):  acetaminophen    Suspension .. 650 milliGRAM(s) Oral every 6 hours PRN Temp greater or equal to 38C (100.4F), Mild Pain (1 - 3)        Vital Signs Last 24 Hrs  T(C): 36.9 (25 Jun 2020 05:00), Max: 36.9 (24 Jun 2020 22:00)  T(F): 98.4 (25 Jun 2020 05:00), Max: 98.5 (24 Jun 2020 22:00)  HR: 83 (25 Jun 2020 05:00) (77 - 84)  BP: 142/79 (25 Jun 2020 05:00) (112/62 - 142/79)  RR: 16 (25 Jun 2020 05:00) (15 - 16)  SpO2: 98% (25 Jun 2020 05:00) (96% - 100%)    Physical Exam:   GENERAL: NAD,  Alert    HEENT:  Normocephalic and atraumatic.   NECK: Supple    CARDIOVASCULAR: radial pulse 2+ regular rate   LUNGS: breathing comfortably on room air   ABDOMEN: ND. Soft    EXTREMITIES: trace lower extremity edema with venous stasis discoloration   SKIN: there is a full thickness wound to the sacral region measuring 6x6.5cm with approx 50% slough to wound base, periwound with partial thickness loss from moisture and mild erythema.   There is a partial thickness wound to the mid upper back that is showing signs of healing, approx 4x0.75cm.   BIlat feet with dry skin.  NEUROLOGIC: oriented to person and place   PSYCHIATRIC: Calm, stable mood

## 2020-06-25 NOTE — PROGRESS NOTE ADULT - SUBJECTIVE AND OBJECTIVE BOX
No distress    Vital Signs Last 24 Hrs  T(C): 36.7 (06-25-20 @ 16:41), Max: 36.9 (06-24-20 @ 22:00)  T(F): 98 (06-25-20 @ 16:41), Max: 98.5 (06-24-20 @ 22:00)  HR: 80 (06-25-20 @ 16:41) (77 - 84)  BP: 122/69 (06-25-20 @ 16:41) (122/69 - 142/79)  RR: 16 (06-25-20 @ 05:00) (16 - 16)  SpO2: 98% (06-25-20 @ 16:23) (98% - 100%)    s1s2  coarse BS  soft  sm edema                                                                                                         9.8    9.25  )-----------( 328      ( 25 Jun 2020 06:30 )             33.1     25 Jun 2020 06:30    146    |  105    |  36     ----------------------------<  102    3.2     |  33     |  1.66     Ca    8.9        25 Jun 2020 06:30  Phos  2.4       25 Jun 2020 06:30  Mg     2.0       25 Jun 2020 06:30    TPro  6.6    /  Alb  2.2    /  TBili  0.7    /  DBili  x      /  AST  16     /  ALT  6      /  AlkPhos  63     25 Jun 2020 06:30    LIVER FUNCTIONS - ( 25 Jun 2020 06:30 )  Alb: 2.2 g/dL / Pro: 6.6 g/dL / ALK PHOS: 63 U/L / ALT: 6 U/L / AST: 16 U/L / GGT: x           A/P:    Severe CM, EF 25-30%, mod TR/AS  Adm w/resp failure, CHF/COPD exacerbation, COVID +  S/p hemodynamic REY w/peak Cr 2.19 - 6/12   Improved w/marco Cr 1.19 - 6/16  S/p rising Cr again, hemodynamic/cardio-renal REY  Renal SONO w/o hydro  On Flomax  Avoid nephrotoxins  Ct is fairly stable  Mild hypernatremia  Hold Lasix  Suppl K  I/Os, daily weights  F/u BMP, Mg    [phone]

## 2020-06-25 NOTE — CONSULT NOTE ADULT - ASSESSMENT
This is a 74 y/o male with acute hypercapnic respiratory failure, acute on chronic HFrEF; VF s/p AICD firing, s/p intubation and successful extubation, protein malnourishment, anemia, hypothyroidism, BPH, CKD S3-4, with pressure wound to the sacral region.       A/P   Stage 3 pressure wound to the sacral region   -C/w offloading measures  -Start local wound care with medihoney daily and PRN   -Add Ensure BID, MVI daily, vit c BID     Xerosis  -Start Aquaphor BID to feet     Wound care performed after evaluation this morning. Orders placed and discussed with patient and nursing.

## 2020-06-25 NOTE — PROGRESS NOTE ADULT - ASSESSMENT
75 M acute hypercapnic respiratory failure, acute on chronic HFrEF; VF s/p AICD firing, hypothyroidism, BPH CKD S3-4    #Acute hypoxic and hypercapnic respiratory failure secondary to COVID-19  - Resolved  - s/p extubation 6/14  - COVID negative x2    #VF s/p AICD firing on 6/17 wide complex intermittent beats most likely related to CM,  - s/p amio drip  off amio  on BB  keep K and mg 4 and 2 respectively   cardio on board    #Afib rate controlled   Cont lopressor , increase dose if BP allows  Cont Eliquis   Keep K and Mg 4 and 2 respectively.   normal TSH     #Acute on chronic HFrEF appears compensated   EF 25-30%   Cont BB   started on isordil 5 mg BID today 6/24. plan to start hydralazine 10 mg BID if BP ok with isordil--> increase BB if BP ok with isordil and hydralazine  Lasix on hold bump up in Cr   renal team following   io uo and regular weight checks   cardio cx appreciated.     #Nonoliguric REY on CKD  nephrology consult appreciated  Lasix Hold for now as above   io ou and regular weight checks.   avoid nephrotoxic agents   small IVF today for dehydration and increased creatinine    #leucocytosis: likely reactive  afebrile  hydrate  monitor    #Hypercalcemia     resolved      #IDDM controlled   on ISS      #Hypothyroidism   continue synthroid   normal TSH    #Sacral decubitus:   wound cx was called.     #Debility:   - OT/PT/PMR eval noted. [facility] vs home with home PT.   - medically deconditioned, and plan for possible rehab, pt to [facility]  but family refused. not to safe to DC home at this time. needs daily OT/PT while in house    #VTE proph   #DNR/I pulm and pal teams seen     Reviewed w irma lal plan of care over the phone  Dispo: [facility] if family agree. otherwise PT/OT daily for 24-48 hrs. needs PT assessment for safe DC home. needs cardiology clearance for DC.    addendum: spoke to Dr. macias. hydralazine was added to regimen. cardiology suggested discussion with family regarding hospice as prognosis is guarded and patient is at high risk of arrythmia and SCD. palliative team was informed. patient is not a candidate for acute rehab as per PM&R. 75 M acute hypercapnic respiratory failure, acute on chronic HFrEF; VF s/p AICD firing, hypothyroidism, BPH CKD S3-4    #Acute hypoxic and hypercapnic respiratory failure secondary to COVID-19  - Resolved  - s/p extubation 6/14  - COVID negative x2    #VF s/p AICD firing on 6/17   - no additional episodes of AICD firing  - s/p amio drip  - cardiology is following  - will change BB to metoprolol succinate 50 mg daily starting tomorrow  - continue hydralazine and nitrates  - unable to use ACE-I/ARB or ARNI due to renal insufficiency   - continue eliquis  - current cardiac status appears to be stable... he is at risk for recurrent arrhythmia and CHF... he should followup with his primary cardiologist Dr. Pelletier post discharge   - keep K and mg 4 and 2 respectively --> will replete potassium today  cardio on board    #Afib, rate controlled   - Cont lopressor for today, will switch to toprol 50mg tomorrow as per card recs   - Cont Eliquis   - Keep K and Mg 4 and 2 respectively.   - normal TSH     #Acute on chronic HFrEF; appears compensated   - EF 25-30%   - Cont BB   - started on isordil 5 mg TID and hydralazine 10 mg BID   - Lasix on hold bump up in Cr   - renal team following   - cardiology following    #Nonoliguric REY on CKD  - nephrology consult appreciated  - Lasix on hold for now as above   - I&O and regular weight checks.   - avoid nephrotoxic agents   - small IVF yesterday for dehydration and increased creatinine    #leukocytosis: likely reactive  - improved    #Hypercalcemia     - resolved      #Type 2 DM  - HbA1C = 5  - Continue accucheck and ISS    #Hypothyroidism   - continue synthroid   - normal TSH    #Sacral decubitus:   - appreciate wound care recs     #Debility:   - OT/PT/PMR eval noted. [facility] vs home with home PT.   - medically deconditioned, and plan for possible rehab, pt to [facility]  but family refused. not to safe to DC home at this time. needs daily OT/PT while in house    #VTE proph   - on eliquis     #DNR/I   - pulm and pal teams seen     6/25: Left message for son Raúl ([phone]) with callback #    Dispo: [facility] if family agree. Patient is max assist, if he goes home will likely need alonzo lift, home aides etc, will d/w family 75 M acute hypercapnic respiratory failure, acute on chronic HFrEF; VF s/p AICD firing, hypothyroidism, BPH CKD S3-4    #Acute hypoxic and hypercapnic respiratory failure secondary to COVID-19  - Resolved  - s/p extubation 6/14  - COVID negative x2    #VF s/p AICD firing on 6/17   - no additional episodes of AICD firing  - s/p amio drip  - cardiology is following  - will change BB to metoprolol succinate 50 mg daily starting tomorrow  - continue hydralazine and nitrates  - unable to use ACE-I/ARB or ARNI due to renal insufficiency   - continue eliquis  - keep K and mg 4 and 2 respectively --> will replete potassium today    #Afib, rate controlled   - Cont lopressor for today, will switch to toprol 50mg tomorrow as per card recs   - Cont Eliquis   - Keep K and Mg 4 and 2 respectively.   - normal TSH     #Acute on chronic HFrEF; appears compensated   - EF 25-30%   - Cont BB   - started on isordil 5 mg TID and hydralazine 10 mg BID   - Lasix on hold bump up in Cr   - renal team following   - cardiology following    #Nonoliguric REY on CKD  - nephrology consult appreciated  - Lasix on hold for now as above   - I&O and regular weight checks.   - avoid nephrotoxic agents   - small IVF yesterday for dehydration and increased creatinine    #leukocytosis: likely reactive  - improved    #Hypercalcemia     - resolved      #Type 2 DM  - HbA1C = 5  - Continue accucheck and ISS    #Hypothyroidism   - continue synthroid   - normal TSH    #Sacral decubitus:   - appreciate wound care recs     #Debility:   - OT/PT/PMR eval noted. [facility] vs home with home PT.   - medically deconditioned, and plan for possible rehab, pt to [facility]  but family refused. not to safe to DC home at this time. needs daily OT/PT while in house    #VTE proph   - on eliquis     #DNR/I   - pulm and pal teams seen     6/25: Spoke with patient's son Raúl ([phone]) to give update and discuss plan of care    Dispo: Patient is max assist, if he goes home will likely need alonzo lift, home aides etc. D/w family, they are more amenable to [facility] if there are visitors allowed, family to call [facility] to find out visitor policies 75 M acute hypercapnic respiratory failure, acute on chronic HFrEF; VF s/p AICD firing, hypothyroidism, BPH CKD S3-4    #Acute hypoxic and hypercapnic respiratory failure secondary to COVID-19  - Resolved  - s/p extubation 6/14  - COVID negative x2    #VF s/p AICD firing on 6/17   - no additional episodes of AICD firing  - s/p amio drip  - cardiology is following  - will change BB to metoprolol succinate 50 mg daily starting tomorrow  - continue hydralazine and nitrates  - unable to use ACE-I/ARB or ARNI due to renal insufficiency   - continue eliquis  - keep K and mg 4 and 2 respectively --> will replete potassium today    #Afib, rate controlled, paroxysmal	   - Cont lopressor for today, will switch to toprol 50mg tomorrow as per card recs   - Cont Eliquis   - Keep K and Mg 4 and 2 respectively.   - normal TSH     #Acute on Chronic HFrEF; appears compensated   - EF 25-30%   - Cont BB   - started on isordil 5 mg TID and hydralazine 10 mg BID   - Lasix on hold bump up in Cr   - renal team following   - cardiology following    #Nonoliguric REY on CKD3  - nephrology consult appreciated  - Lasix on hold for now as above   - I&O and regular weight checks.   - avoid nephrotoxic agents   - small IVF yesterday for dehydration and increased creatinine    #leukocytosis: likely reactive  - improved    #Hypercalcemia     - resolved      #Type 2 DM  - HbA1C = 5  - Continue accucheck and ISS    #Hypothyroidism   - continue synthroid   - normal TSH    #Sacral decubitus:   - appreciate wound care recs     #Debility:   - OT/PT/PMR eval noted. [facility] vs home with home PT.   - medically deconditioned, and plan for possible rehab, pt to [facility]  but family refused. not to safe to DC home at this time. needs daily OT/PT while in house    #VTE proph   - on eliquis     #DNR/I   - pulm and pal teams seen     6/25: Spoke with patient's son Raúl ([phone]) to give update and discuss plan of care    Dispo: Patient is max assist, if he goes home will likely need alonzo lift, home aides etc. D/w family, they are more amenable to [facility] if there are visitors allowed, family to call [facility] to find out visitor policies

## 2020-06-25 NOTE — PROGRESS NOTE ADULT - SUBJECTIVE AND OBJECTIVE BOX
Patient is a 75y old  Male who presents with a chief complaint of Respiratory failure (25 Jun 2020 09:30)    Patient seen and examined at bedside. Patient currently resting in bed, he says that he wants to go home. When asked if he is still experiencing hand pain, he reports right hand stiffness and discomfort     ALLERGIES:  No Known Allergies    MEDICATIONS  (STANDING):  ALBUTerol    90 MICROgram(s) HFA Inhaler 2 Puff(s) Inhalation every 6 hours  apixaban 5 milliGRAM(s) Oral every 12 hours  ascorbic acid 500 milliGRAM(s) Oral two times a day  dextrose 5%. 1000 milliLiter(s) (50 mL/Hr) IV Continuous <Continuous>  dextrose 50% Injectable 12.5 Gram(s) IV Push once  dextrose 50% Injectable 25 Gram(s) IV Push once  dextrose 50% Injectable 25 Gram(s) IV Push once  hydrALAZINE 10 milliGRAM(s) Oral two times a day  insulin lispro (HumaLOG) corrective regimen sliding scale   SubCutaneous every 6 hours  ipratropium 17 MICROgram(s) HFA Inhaler 2 Puff(s) Inhalation every 6 hours  isosorbide   dinitrate Tablet (ISORDIL) 5 milliGRAM(s) Oral three times a day  levothyroxine 25 MICROGram(s) Oral daily  metoprolol tartrate 25 milliGRAM(s) Oral two times a day  Nephro-alon 1 Tablet(s) Oral daily  pantoprazole   Suspension 40 milliGRAM(s) Enteral Tube daily  tamsulosin 0.4 milliGRAM(s) Oral at bedtime    MEDICATIONS  (PRN):  acetaminophen    Suspension .. 650 milliGRAM(s) Oral every 6 hours PRN Temp greater or equal to 38C (100.4F), Mild Pain (1 - 3)    Vital Signs Last 24 Hrs  T(F): 98.4 (25 Jun 2020 05:00), Max: 98.5 (24 Jun 2020 22:00)  HR: 83 (25 Jun 2020 05:00) (77 - 84)  BP: 142/79 (25 Jun 2020 05:00) (112/62 - 142/79)  RR: 16 (25 Jun 2020 05:00) (15 - 16)  SpO2: 98% (25 Jun 2020 05:00) (98% - 100%)    I&O's Summary  24 Jun 2020 07:01  -  25 Jun 2020 07:00  --------------------------------------------------------  IN: 120 mL / OUT: 500 mL / NET: -380 mL    PHYSICAL EXAM:  GENERAL: NAD, drowsy  HEAD:  Atraumatic, Normocephalic  EYES: EOMI, conjunctiva and sclera clear  ENMT: Moist mucous membranes  CHEST/LUNG: Clear to auscultation bilaterally, good air entry, non-labored breathing  HEART: RRR; S1/S2, No murmur  ABDOMEN: Soft, Nontender, Nondistended; Bowel sounds present  VASCULAR: Normal pulses, Normal capillary refill  EXTREMITIES: No calf tenderness, No cyanosis, No edema  SKIN: Pressure ulcer to sacrum, right upper back. Skin otherwise intact   NERVOUS SYSTEM:  A/O x3, generally weak, No focal deficits    LABS:                        9.8    9.25  )-----------( 328      ( 25 Jun 2020 06:30 )             33.1     06-25    146  |  105  |  36  ----------------------------<  102  3.2   |  33  |  1.66    Ca    8.9      25 Jun 2020 06:30  Phos  2.4     06-25  Mg     2.0     06-25    TPro  6.6  /  Alb  2.2  /  TBili  0.7  /  DBili  x   /  AST  16  /  ALT  6   /  AlkPhos  63  06-25    eGFR if Non African American: 40 mL/min/1.73M2 (06-25-20 @ 06:30)  eGFR if : 46 mL/min/1.73M2 (06-25-20 @ 06:30)    POCT Blood Glucose.: 76 mg/dL (25 Jun 2020 05:22)  POCT Blood Glucose.: 72 mg/dL (24 Jun 2020 23:57)  POCT Blood Glucose.: 109 mg/dL (24 Jun 2020 17:12)  POCT Blood Glucose.: 78 mg/dL (24 Jun 2020 12:00)    RADIOLOGY & ADDITIONAL TESTS:    < from: TTE Echo Complete w/o Contrast w/ Doppler (06.12.20 @ 13:01) >  Summary:   1. Left ventricular ejection fraction, by visual estimation, is 25 to 30%.   2. Severely decreased global left ventricular systolic function.   3. Mildly increased LV wall thickness.   4. Spectral Doppler shows restrictive pattern of left ventricular myocardial filling (Grade III diastolic dysfunction).   5. There is no evidence of pericardial effusion.   6. Thickening and calcification of the anterior and posterior mitral valve leaflets.   7. Moderate tricuspid regurgitation.   8. Mild aortic regurgitation.   9. Moderate aortic valve stenosis.  10. Structurally normalpulmonic valve, with normal leaflet excursion.  11. Dilatation of the ascending aorta.  12. Estimated pulmonary artery systolic pressure is 63.4 mmHg assuming a right atrial pressure of 10 mmHg, which is consistent with moderate pulmonary hypertension.  13. Peak transaortic gradient equals 11.2 mmHg, mean transaortic gradient equals 6.0 mmHg, the calculated aortic valve area equals 1.37 cm² by the continuity equation consistent with moderate aortic stenosis.    < end of copied text >      Care Discussed with Consultants/Other Providers:

## 2020-06-26 ENCOUNTER — TRANSCRIPTION ENCOUNTER (OUTPATIENT)
Age: 75
End: 2020-06-26

## 2020-06-26 VITALS — OXYGEN SATURATION: 94 %

## 2020-06-26 LAB
ALBUMIN SERPL ELPH-MCNC: 2.4 G/DL — LOW (ref 3.3–5)
ALP SERPL-CCNC: 64 U/L — SIGNIFICANT CHANGE UP (ref 40–120)
ALT FLD-CCNC: 9 U/L — LOW (ref 10–45)
ANION GAP SERPL CALC-SCNC: 12 MMOL/L — SIGNIFICANT CHANGE UP (ref 5–17)
ANION GAP SERPL CALC-SCNC: 7 MMOL/L — SIGNIFICANT CHANGE UP (ref 5–17)
AST SERPL-CCNC: 19 U/L — SIGNIFICANT CHANGE UP (ref 10–40)
BASOPHILS # BLD AUTO: 0.04 K/UL — SIGNIFICANT CHANGE UP (ref 0–0.2)
BASOPHILS NFR BLD AUTO: 0.4 % — SIGNIFICANT CHANGE UP (ref 0–2)
BILIRUB SERPL-MCNC: 0.8 MG/DL — SIGNIFICANT CHANGE UP (ref 0.2–1.2)
BUN SERPL-MCNC: 36 MG/DL — HIGH (ref 7–23)
BUN SERPL-MCNC: 36 MG/DL — HIGH (ref 7–23)
CALCIUM SERPL-MCNC: 8.9 MG/DL — SIGNIFICANT CHANGE UP (ref 8.4–10.5)
CALCIUM SERPL-MCNC: 9 MG/DL — SIGNIFICANT CHANGE UP (ref 8.4–10.5)
CHLORIDE SERPL-SCNC: 104 MMOL/L — SIGNIFICANT CHANGE UP (ref 96–108)
CHLORIDE SERPL-SCNC: 105 MMOL/L — SIGNIFICANT CHANGE UP (ref 96–108)
CO2 SERPL-SCNC: 30 MMOL/L — SIGNIFICANT CHANGE UP (ref 22–31)
CO2 SERPL-SCNC: 32 MMOL/L — HIGH (ref 22–31)
CREAT SERPL-MCNC: 1.81 MG/DL — HIGH (ref 0.5–1.3)
CREAT SERPL-MCNC: 2 MG/DL — HIGH (ref 0.5–1.3)
EOSINOPHIL # BLD AUTO: 0.39 K/UL — SIGNIFICANT CHANGE UP (ref 0–0.5)
EOSINOPHIL NFR BLD AUTO: 3.7 % — SIGNIFICANT CHANGE UP (ref 0–6)
GLUCOSE BLDC GLUCOMTR-MCNC: 185 MG/DL — HIGH (ref 70–99)
GLUCOSE BLDC GLUCOMTR-MCNC: 94 MG/DL — SIGNIFICANT CHANGE UP (ref 70–99)
GLUCOSE SERPL-MCNC: 120 MG/DL — HIGH (ref 70–99)
GLUCOSE SERPL-MCNC: 76 MG/DL — SIGNIFICANT CHANGE UP (ref 70–99)
HCT VFR BLD CALC: 32.8 % — LOW (ref 39–50)
HGB BLD-MCNC: 9.9 G/DL — LOW (ref 13–17)
IMM GRANULOCYTES NFR BLD AUTO: 0.5 % — SIGNIFICANT CHANGE UP (ref 0–1.5)
LYMPHOCYTES # BLD AUTO: 2.13 K/UL — SIGNIFICANT CHANGE UP (ref 1–3.3)
LYMPHOCYTES # BLD AUTO: 20.2 % — SIGNIFICANT CHANGE UP (ref 13–44)
MAGNESIUM SERPL-MCNC: 1.9 MG/DL — SIGNIFICANT CHANGE UP (ref 1.6–2.6)
MCHC RBC-ENTMCNC: 28.4 PG — SIGNIFICANT CHANGE UP (ref 27–34)
MCHC RBC-ENTMCNC: 30.2 GM/DL — LOW (ref 32–36)
MCV RBC AUTO: 94 FL — SIGNIFICANT CHANGE UP (ref 80–100)
MONOCYTES # BLD AUTO: 0.97 K/UL — HIGH (ref 0–0.9)
MONOCYTES NFR BLD AUTO: 9.2 % — SIGNIFICANT CHANGE UP (ref 2–14)
NEUTROPHILS # BLD AUTO: 6.96 K/UL — SIGNIFICANT CHANGE UP (ref 1.8–7.4)
NEUTROPHILS NFR BLD AUTO: 66 % — SIGNIFICANT CHANGE UP (ref 43–77)
NRBC # BLD: 0 /100 WBCS — SIGNIFICANT CHANGE UP (ref 0–0)
PHOSPHATE SERPL-MCNC: 2.2 MG/DL — LOW (ref 2.5–4.5)
PLATELET # BLD AUTO: 358 K/UL — SIGNIFICANT CHANGE UP (ref 150–400)
POTASSIUM SERPL-MCNC: 3.8 MMOL/L — SIGNIFICANT CHANGE UP (ref 3.5–5.3)
POTASSIUM SERPL-MCNC: 3.8 MMOL/L — SIGNIFICANT CHANGE UP (ref 3.5–5.3)
POTASSIUM SERPL-SCNC: 3.8 MMOL/L — SIGNIFICANT CHANGE UP (ref 3.5–5.3)
POTASSIUM SERPL-SCNC: 3.8 MMOL/L — SIGNIFICANT CHANGE UP (ref 3.5–5.3)
PROT SERPL-MCNC: 6.9 G/DL — SIGNIFICANT CHANGE UP (ref 6–8.3)
RBC # BLD: 3.49 M/UL — LOW (ref 4.2–5.8)
RBC # FLD: 17.2 % — HIGH (ref 10.3–14.5)
SODIUM SERPL-SCNC: 143 MMOL/L — SIGNIFICANT CHANGE UP (ref 135–145)
SODIUM SERPL-SCNC: 147 MMOL/L — HIGH (ref 135–145)
WBC # BLD: 10.54 K/UL — HIGH (ref 3.8–10.5)
WBC # FLD AUTO: 10.54 K/UL — HIGH (ref 3.8–10.5)

## 2020-06-26 PROCEDURE — 87040 BLOOD CULTURE FOR BACTERIA: CPT

## 2020-06-26 PROCEDURE — 85379 FIBRIN DEGRADATION QUANT: CPT

## 2020-06-26 PROCEDURE — 82306 VITAMIN D 25 HYDROXY: CPT

## 2020-06-26 PROCEDURE — 80048 BASIC METABOLIC PNL TOTAL CA: CPT

## 2020-06-26 PROCEDURE — 83880 ASSAY OF NATRIURETIC PEPTIDE: CPT

## 2020-06-26 PROCEDURE — 85730 THROMBOPLASTIN TIME PARTIAL: CPT

## 2020-06-26 PROCEDURE — 84155 ASSAY OF PROTEIN SERUM: CPT

## 2020-06-26 PROCEDURE — 82310 ASSAY OF CALCIUM: CPT

## 2020-06-26 PROCEDURE — 93306 TTE W/DOPPLER COMPLETE: CPT

## 2020-06-26 PROCEDURE — 31500 INSERT EMERGENCY AIRWAY: CPT

## 2020-06-26 PROCEDURE — 82652 VIT D 1 25-DIHYDROXY: CPT

## 2020-06-26 PROCEDURE — 84165 PROTEIN E-PHORESIS SERUM: CPT

## 2020-06-26 PROCEDURE — 80053 COMPREHEN METABOLIC PANEL: CPT

## 2020-06-26 PROCEDURE — 84484 ASSAY OF TROPONIN QUANT: CPT

## 2020-06-26 PROCEDURE — 83605 ASSAY OF LACTIC ACID: CPT

## 2020-06-26 PROCEDURE — 82803 BLOOD GASES ANY COMBINATION: CPT

## 2020-06-26 PROCEDURE — 84550 ASSAY OF BLOOD/URIC ACID: CPT

## 2020-06-26 PROCEDURE — 83735 ASSAY OF MAGNESIUM: CPT

## 2020-06-26 PROCEDURE — 70450 CT HEAD/BRAIN W/O DYE: CPT

## 2020-06-26 PROCEDURE — 96361 HYDRATE IV INFUSION ADD-ON: CPT

## 2020-06-26 PROCEDURE — 82962 GLUCOSE BLOOD TEST: CPT

## 2020-06-26 PROCEDURE — 36600 WITHDRAWAL OF ARTERIAL BLOOD: CPT

## 2020-06-26 PROCEDURE — 83036 HEMOGLOBIN GLYCOSYLATED A1C: CPT

## 2020-06-26 PROCEDURE — U0003: CPT

## 2020-06-26 PROCEDURE — 83615 LACTATE (LD) (LDH) ENZYME: CPT

## 2020-06-26 PROCEDURE — 71045 X-RAY EXAM CHEST 1 VIEW: CPT

## 2020-06-26 PROCEDURE — 84436 ASSAY OF TOTAL THYROXINE: CPT

## 2020-06-26 PROCEDURE — 84443 ASSAY THYROID STIM HORMONE: CPT

## 2020-06-26 PROCEDURE — 82164 ANGIOTENSIN I ENZYME TEST: CPT

## 2020-06-26 PROCEDURE — 96365 THER/PROPH/DIAG IV INF INIT: CPT | Mod: XU

## 2020-06-26 PROCEDURE — 82728 ASSAY OF FERRITIN: CPT

## 2020-06-26 PROCEDURE — 99291 CRITICAL CARE FIRST HOUR: CPT | Mod: 25

## 2020-06-26 PROCEDURE — 86334 IMMUNOFIX E-PHORESIS SERUM: CPT

## 2020-06-26 PROCEDURE — 94640 AIRWAY INHALATION TREATMENT: CPT

## 2020-06-26 PROCEDURE — 93005 ELECTROCARDIOGRAM TRACING: CPT

## 2020-06-26 PROCEDURE — 87086 URINE CULTURE/COLONY COUNT: CPT

## 2020-06-26 PROCEDURE — 82330 ASSAY OF CALCIUM: CPT

## 2020-06-26 PROCEDURE — 84145 PROCALCITONIN (PCT): CPT

## 2020-06-26 PROCEDURE — 97530 THERAPEUTIC ACTIVITIES: CPT

## 2020-06-26 PROCEDURE — 36415 COLL VENOUS BLD VENIPUNCTURE: CPT

## 2020-06-26 PROCEDURE — 76775 US EXAM ABDO BACK WALL LIM: CPT

## 2020-06-26 PROCEDURE — 85610 PROTHROMBIN TIME: CPT

## 2020-06-26 PROCEDURE — 84100 ASSAY OF PHOSPHORUS: CPT

## 2020-06-26 PROCEDURE — 87635 SARS-COV-2 COVID-19 AMP PRB: CPT

## 2020-06-26 PROCEDURE — 92526 ORAL FUNCTION THERAPY: CPT

## 2020-06-26 PROCEDURE — 86769 SARS-COV-2 COVID-19 ANTIBODY: CPT

## 2020-06-26 PROCEDURE — 83519 RIA NONANTIBODY: CPT

## 2020-06-26 PROCEDURE — 99239 HOSP IP/OBS DSCHRG MGMT >30: CPT | Mod: GC

## 2020-06-26 PROCEDURE — 83970 ASSAY OF PARATHORMONE: CPT

## 2020-06-26 PROCEDURE — 81001 URINALYSIS AUTO W/SCOPE: CPT

## 2020-06-26 PROCEDURE — 96375 TX/PRO/DX INJ NEW DRUG ADDON: CPT | Mod: XU

## 2020-06-26 PROCEDURE — 86140 C-REACTIVE PROTEIN: CPT

## 2020-06-26 PROCEDURE — 92610 EVALUATE SWALLOWING FUNCTION: CPT

## 2020-06-26 PROCEDURE — 82550 ASSAY OF CK (CPK): CPT

## 2020-06-26 PROCEDURE — 84480 ASSAY TRIIODOTHYRONINE (T3): CPT

## 2020-06-26 PROCEDURE — 94003 VENT MGMT INPAT SUBQ DAY: CPT

## 2020-06-26 PROCEDURE — 85027 COMPLETE CBC AUTOMATED: CPT

## 2020-06-26 RX ORDER — CALCIUM ACETATE 667 MG
1 TABLET ORAL
Qty: 0 | Refills: 0 | DISCHARGE

## 2020-06-26 RX ORDER — LEVOTHYROXINE SODIUM 125 MCG
1 TABLET ORAL
Qty: 0 | Refills: 0 | DISCHARGE
Start: 2020-06-26

## 2020-06-26 RX ORDER — CALCITRIOL 0.5 UG/1
1 CAPSULE ORAL
Qty: 0 | Refills: 0 | DISCHARGE

## 2020-06-26 RX ORDER — HYDRALAZINE HCL 50 MG
1 TABLET ORAL
Qty: 0 | Refills: 0 | DISCHARGE
Start: 2020-06-26

## 2020-06-26 RX ORDER — ASCORBIC ACID 60 MG
1 TABLET,CHEWABLE ORAL
Qty: 0 | Refills: 0 | DISCHARGE
Start: 2020-06-26

## 2020-06-26 RX ORDER — APIXABAN 2.5 MG/1
1 TABLET, FILM COATED ORAL
Qty: 0 | Refills: 0 | DISCHARGE

## 2020-06-26 RX ORDER — MIDODRINE HYDROCHLORIDE 2.5 MG/1
0 TABLET ORAL
Qty: 0 | Refills: 0 | DISCHARGE

## 2020-06-26 RX ORDER — APIXABAN 2.5 MG/1
1 TABLET, FILM COATED ORAL
Qty: 0 | Refills: 0 | DISCHARGE
Start: 2020-06-26

## 2020-06-26 RX ORDER — SODIUM CHLORIDE 9 MG/ML
1000 INJECTION, SOLUTION INTRAVENOUS
Refills: 0 | Status: DISCONTINUED | OUTPATIENT
Start: 2020-06-26 | End: 2020-06-26

## 2020-06-26 RX ORDER — ISOSORBIDE DINITRATE 5 MG/1
1 TABLET ORAL
Qty: 0 | Refills: 0 | DISCHARGE
Start: 2020-06-26

## 2020-06-26 RX ORDER — METOPROLOL TARTRATE 50 MG
1 TABLET ORAL
Qty: 0 | Refills: 0 | DISCHARGE
Start: 2020-06-26

## 2020-06-26 RX ORDER — LEVOTHYROXINE SODIUM 125 MCG
1 TABLET ORAL
Qty: 0 | Refills: 0 | DISCHARGE

## 2020-06-26 RX ORDER — IPRATROPIUM BROMIDE 0.2 MG/ML
2 SOLUTION, NON-ORAL INHALATION
Qty: 0 | Refills: 0 | DISCHARGE
Start: 2020-06-26

## 2020-06-26 RX ORDER — TAMSULOSIN HYDROCHLORIDE 0.4 MG/1
1 CAPSULE ORAL
Qty: 0 | Refills: 0 | DISCHARGE
Start: 2020-06-26

## 2020-06-26 RX ORDER — GABAPENTIN 400 MG/1
1 CAPSULE ORAL
Qty: 0 | Refills: 0 | DISCHARGE

## 2020-06-26 RX ORDER — POTASSIUM CHLORIDE 20 MEQ
0 PACKET (EA) ORAL
Qty: 0 | Refills: 0 | DISCHARGE

## 2020-06-26 RX ADMIN — Medication 500 MILLIGRAM(S): at 06:35

## 2020-06-26 RX ADMIN — Medication 2 PUFF(S): at 09:21

## 2020-06-26 RX ADMIN — Medication 25 MICROGRAM(S): at 06:36

## 2020-06-26 RX ADMIN — Medication 2 PUFF(S): at 05:25

## 2020-06-26 RX ADMIN — Medication 10 MILLIGRAM(S): at 06:37

## 2020-06-26 RX ADMIN — ALBUTEROL 2 PUFF(S): 90 AEROSOL, METERED ORAL at 16:19

## 2020-06-26 RX ADMIN — Medication 2 PUFF(S): at 16:20

## 2020-06-26 RX ADMIN — PANTOPRAZOLE SODIUM 40 MILLIGRAM(S): 20 TABLET, DELAYED RELEASE ORAL at 11:38

## 2020-06-26 RX ADMIN — APIXABAN 5 MILLIGRAM(S): 2.5 TABLET, FILM COATED ORAL at 06:35

## 2020-06-26 RX ADMIN — ISOSORBIDE DINITRATE 5 MILLIGRAM(S): 5 TABLET ORAL at 06:36

## 2020-06-26 RX ADMIN — ISOSORBIDE DINITRATE 5 MILLIGRAM(S): 5 TABLET ORAL at 13:57

## 2020-06-26 RX ADMIN — Medication 2: at 11:38

## 2020-06-26 RX ADMIN — Medication 50 MILLIGRAM(S): at 06:37

## 2020-06-26 RX ADMIN — ALBUTEROL 2 PUFF(S): 90 AEROSOL, METERED ORAL at 05:26

## 2020-06-26 RX ADMIN — Medication 1 TABLET(S): at 11:38

## 2020-06-26 RX ADMIN — ALBUTEROL 2 PUFF(S): 90 AEROSOL, METERED ORAL at 09:21

## 2020-06-26 RX ADMIN — SODIUM CHLORIDE 125 MILLILITER(S): 9 INJECTION, SOLUTION INTRAVENOUS at 10:32

## 2020-06-26 NOTE — PROGRESS NOTE ADULT - SUBJECTIVE AND OBJECTIVE BOX
No distress    Vital Signs Last 24 Hrs  T(C): 36.8 (06-26-20 @ 05:00), Max: 36.9 (06-25-20 @ 21:00)  T(F): 98.2 (06-26-20 @ 05:00), Max: 98.4 (06-25-20 @ 21:00)  HR: 83 (06-26-20 @ 09:22) (78 - 83)  BP: 136/68 (06-26-20 @ 05:00) (120/78 - 136/68)  RR: 16 (06-26-20 @ 05:00) (16 - 68)  SpO2: 94% (06-26-20 @ 09:22) (92% - 98%)    s1s2  coarse BS  soft  sm edema                                                                                                                  9.9    10.54 )-----------( 358      ( 26 Jun 2020 05:30 )             32.8     26 Jun 2020 05:30    147    |  105    |  36     ----------------------------<  76     3.8     |  30     |  1.81     Ca    9.0        26 Jun 2020 05:30  Phos  2.2       26 Jun 2020 05:30  Mg     1.9       26 Jun 2020 05:30    TPro  6.9    /  Alb  2.4    /  TBili  0.8    /  DBili  x      /  AST  19     /  ALT  9      /  AlkPhos  64     26 Jun 2020 05:30    LIVER FUNCTIONS - ( 26 Jun 2020 05:30 )  Alb: 2.4 g/dL / Pro: 6.9 g/dL / ALK PHOS: 64 U/L / ALT: 9 U/L / AST: 19 U/L / GGT: x           acetaminophen    Suspension .. 650 milliGRAM(s) Oral every 6 hours PRN  ALBUTerol    90 MICROgram(s) HFA Inhaler 2 Puff(s) Inhalation every 6 hours  apixaban 5 milliGRAM(s) Oral every 12 hours  ascorbic acid 500 milliGRAM(s) Oral two times a day  dextrose 5%. 1000 milliLiter(s) IV Continuous <Continuous>  dextrose 5%. 1000 milliLiter(s) IV Continuous <Continuous>  dextrose 50% Injectable 12.5 Gram(s) IV Push once  dextrose 50% Injectable 25 Gram(s) IV Push once  dextrose 50% Injectable 25 Gram(s) IV Push once  hydrALAZINE 10 milliGRAM(s) Oral two times a day  insulin lispro (HumaLOG) corrective regimen sliding scale   SubCutaneous every 6 hours  ipratropium 17 MICROgram(s) HFA Inhaler 2 Puff(s) Inhalation every 6 hours  isosorbide   dinitrate Tablet (ISORDIL) 5 milliGRAM(s) Oral three times a day  levothyroxine 25 MICROGram(s) Oral daily  metoprolol succinate ER 50 milliGRAM(s) Oral daily  Nephro-alon 1 Tablet(s) Oral daily  pantoprazole   Suspension 40 milliGRAM(s) Enteral Tube daily  tamsulosin 0.4 milliGRAM(s) Oral at bedtime    A/P:    Severe CM, EF 25-30%, mod TR/AS  Adm w/resp failure, CHF/COPD exacerbation, COVID +  S/p hemodynamic REY w/peak Cr 2.19 - 6/12   Improved w/marco Cr 1.19 - 6/16  Rising Cr again, hemodynamic/cardio-renal   SONO w/o hydro  Avoid nephrotoxins  Mild hypernatremia  Hold Lasix  D5W at 50cc/hr  I/Os, daily weights  F/u BMP, Mg    [phone]

## 2020-06-26 NOTE — PROGRESS NOTE ADULT - SUBJECTIVE AND OBJECTIVE BOX
Patient is a 75y old  Male who presents with a chief complaint of Respiratory failure (25 Jun 2020 17:57)    Patient seen and examined at bedside. Patient current without complaint, requests to go home.     ALLERGIES:  No Known Allergies    MEDICATIONS  (STANDING):  ALBUTerol    90 MICROgram(s) HFA Inhaler 2 Puff(s) Inhalation every 6 hours  apixaban 5 milliGRAM(s) Oral every 12 hours  ascorbic acid 500 milliGRAM(s) Oral two times a day  dextrose 5%. 1000 milliLiter(s) (50 mL/Hr) IV Continuous <Continuous>  dextrose 50% Injectable 12.5 Gram(s) IV Push once  dextrose 50% Injectable 25 Gram(s) IV Push once  dextrose 50% Injectable 25 Gram(s) IV Push once  hydrALAZINE 10 milliGRAM(s) Oral two times a day  insulin lispro (HumaLOG) corrective regimen sliding scale   SubCutaneous every 6 hours  ipratropium 17 MICROgram(s) HFA Inhaler 2 Puff(s) Inhalation every 6 hours  isosorbide   dinitrate Tablet (ISORDIL) 5 milliGRAM(s) Oral three times a day  levothyroxine 25 MICROGram(s) Oral daily  metoprolol succinate ER 50 milliGRAM(s) Oral daily  Nephro-alon 1 Tablet(s) Oral daily  pantoprazole   Suspension 40 milliGRAM(s) Enteral Tube daily  tamsulosin 0.4 milliGRAM(s) Oral at bedtime    MEDICATIONS  (PRN):  acetaminophen    Suspension .. 650 milliGRAM(s) Oral every 6 hours PRN Temp greater or equal to 38C (100.4F), Mild Pain (1 - 3)    Vital Signs Last 24 Hrs  T(F): 98.2 (26 Jun 2020 05:00), Max: 98.4 (25 Jun 2020 21:00)  HR: 83 (26 Jun 2020 09:22) (78 - 83)  BP: 136/68 (26 Jun 2020 05:00) (120/78 - 136/68)  RR: 16 (26 Jun 2020 05:00) (16 - 68)  SpO2: 94% (26 Jun 2020 09:22) (92% - 98%)    I&O's Summary  25 Jun 2020 07:01  -  26 Jun 2020 07:00  --------------------------------------------------------  IN: 100 mL / OUT: 225 mL / NET: -125 mL    26 Jun 2020 07:01  -  26 Jun 2020 09:48  --------------------------------------------------------  IN: 200 mL / OUT: 0 mL / NET: 200 mL    PHYSICAL EXAM:  GENERAL: NAD, drowsy  HEAD:  Atraumatic, Normocephalic  EYES: EOMI, conjunctiva and sclera clear  ENMT: Moist mucous membranes  CHEST/LUNG: Clear to auscultation bilaterally, good air entry, non-labored breathing  HEART: RRR; S1/S2, No murmur  ABDOMEN: Soft, Nontender, Nondistended; Bowel sounds present  VASCULAR: Normal pulses, Normal capillary refill  EXTREMITIES: No calf tenderness, No cyanosis, No edema  SKIN: Pressure ulcer to sacrum, right upper back. Skin otherwise intact   NERVOUS SYSTEM:  A/O x3, generally weak, No focal deficits    LABS:                        9.9    10.54 )-----------( 358      ( 26 Jun 2020 05:30 )             32.8     06-26    147  |  105  |  36  ----------------------------<  76  3.8   |  30  |  1.81    Ca    9.0      26 Jun 2020 05:30  Phos  2.2     06-26  Mg     1.9     06-26    TPro  6.9  /  Alb  2.4  /  TBili  0.8  /  DBili  x   /  AST  19  /  ALT  9   /  AlkPhos  64  06-26    eGFR if Non African American: 36 mL/min/1.73M2 (06-26-20 @ 05:30)  eGFR if : 41 mL/min/1.73M2 (06-26-20 @ 05:30)    POCT Blood Glucose.: 94 mg/dL (26 Jun 2020 06:10)  POCT Blood Glucose.: 77 mg/dL (25 Jun 2020 22:17)  POCT Blood Glucose.: 98 mg/dL (25 Jun 2020 17:21)  POCT Blood Glucose.: 88 mg/dL (25 Jun 2020 12:17)    RADIOLOGY & ADDITIONAL TESTS:    Care Discussed with Consultants/Other Providers:

## 2020-06-26 NOTE — DISCHARGE NOTE PROVIDER - HOSPITAL COURSE
Hospital Course    75y Male with extensive PMH including CAD, systolic/diastolic heart failure status post AICD (Ef 25-30%), atrial fibrillation status post cardioversion on eliquis, COPD, type 2 DM, CKD, hypothyroidism who presented to [facility] on 6/12 from [facility] with altered mental status.  ABG revealed acute hypercarbic respiratory failure and patient was intubated, COVID positive.  Transferred to ICU for further care. Patient improved and was extubated on 6/14 and transferred to tele floor on 6/17.     On 6/17 patient developed torsades de pointe/VF and then firing of defibrillator x3. He was transferred back to ICU for amiodarone gtt. Patient then in paced rhythm, no additional firing of AICD though did have 3 beats wide complex tachycardia on 6/21. At high risk for recurrent arrhythmia and CHF, will require follow up with primary cardiologist Dr. Pelletier post discharge.    Started on BB, hydralazine and nitrates with good response.    Hospital course also significant for hemodynamic/Cardio-renal REY and sacral pressure wound.     He was transferred back to floor on 6/20.     Pt showing improvement in metabolic encephalopathy. Respiratory status stable. Stable for DC to [facility]        Source of Infection: COVID-19     Antibiotic / Last Day: Tested negative x2 prior to DC        Palliative Care / Advanced Care Planning    Code Status: DNR        Discharging Provider:  Una Magdaleno NP    Contact Info: [phone] - Please call with any questions or concerns.        Outpatient Provider: Dr Aguilar - [facility]         Signout given to  SNF Provider: Dr Reyes

## 2020-06-26 NOTE — DISCHARGE NOTE NURSING/CASE MANAGEMENT/SOCIAL WORK - PATIENT PORTAL LINK FT
You can access the FollowMyHealth Patient Portal offered by [facility] by registering at the following website: [email or web address]. By joining [employer]’s FollowMyHealth portal, you will also be able to view your health information using other applications (apps) compatible with our system.

## 2020-06-26 NOTE — DISCHARGE NOTE PROVIDER - NSDCCONDITION_GEN_ALL_CORE
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  - Will follow    Umer Blanco MD  [facility] - A Division of [address] [address]  (T) [phone]  (F) [phone] Stable

## 2020-06-26 NOTE — PROGRESS NOTE ADULT - REASON FOR ADMISSION
Respiratory failure
Acute hypoxic respiratory failure fu
Respiratory failure
Acute hypoxic respiratory failure fu

## 2020-06-26 NOTE — PROGRESS NOTE ADULT - ASSESSMENT
75 M acute hypercapnic respiratory failure, acute on chronic HFrEF; VF s/p AICD firing, hypothyroidism, BPH CKD S3-4    #Acute hypoxic and hypercapnic respiratory failure secondary to COVID-19  - Resolved  - s/p extubation 6/14  - COVID negative x2    #VF s/p AICD firing on 6/17   - no additional episodes of AICD firing  - s/p amio drip  - cardiology is following  - Switch to metoprolol succinate 50 mg daily starting tomorrow  - continue hydralazine and nitrates  - unable to use ACE-I/ARB or ARNI due to renal insufficiency   - continue eliquis  - keep K and mg 4 and 2 respectively --> will replete potassium today    #Afib, rate controlled, paroxysmal	   - Cont toprol 50mg daily  - Cont Eliquis   - Keep K and Mg 4 and 2 respectively.   - normal TSH     #Acute on Chronic HFrEF; appears compensated   - EF 25-30%   - Cont BB   - started on isordil 5 mg TID and hydralazine 10 mg BID   - Lasix on hold bump up in Cr   - renal team following   - cardiology following    #Nonoliguric REY on CKD3  - nephrology consult appreciated  - Lasix on hold for now as above   - I&O and regular weight checks.   - avoid nephrotoxic agents   - small IVF yesterday for dehydration and increased creatinine    #Hypernatremia  - Na 147 today  - Will give IVF and repeat BMP at 1400    #leukocytosis: likely reactive  - improved    #Hypercalcemia     - resolved      #Type 2 DM  - HbA1C = 5  - Continue accucheck and ISS    #Hypothyroidism   - continue synthroid   - normal TSH    #Sacral decubitus:   - appreciate wound care recs     #Debility:   - OT/PT/PMR eval noted. Plan for [facility]    #VTE proph   - on eliquis     #DNR/I   - pulm and pal teams seen     6/25: Spoke with patient's son Raúl ([phone]) to give update and discuss plan of care    Dispo: [facility], pending auth 75 M acute hypercapnic respiratory failure, acute on chronic HFrEF; VF s/p AICD firing, hypothyroidism, BPH CKD S3-4    #Acute hypoxic and hypercapnic respiratory failure secondary to COVID-19  - Resolved  - s/p extubation 6/14  - COVID negative x2    #VF s/p AICD firing on 6/17   - no additional episodes of AICD firing  - s/p amio drip  - cardiology is following  - Switch to metoprolol succinate 50 mg daily starting tomorrow  - continue hydralazine and nitrates  - unable to use ACE-I/ARB or ARNI due to renal insufficiency   - continue eliquis  - keep K and mg 4 and 2 respectively --> will replete potassium today    #Afib, rate controlled, paroxysmal	   - Cont toprol 50mg daily  - Cont Eliquis   - Keep K and Mg 4 and 2 respectively.   - normal TSH     #Acute on Chronic HFrEF; appears compensated   - EF 25-30%   - Cont BB   - started on isordil 5 mg TID and hydralazine 10 mg BID   - Lasix on hold bump up in Cr   - renal team following   - cardiology following    #Nonoliguric REY on CKD3  - nephrology consult appreciated  - Lasix on hold for now as above   - I&O and regular weight checks.   - avoid nephrotoxic agents   - small IVF yesterday for dehydration and increased creatinine    #Hypernatremia  - Na 147 today  - Will give IVF and repeat BMP at 1400    #leukocytosis: likely reactive  - improved    #Hypercalcemia     - resolved      #Type 2 DM  - HbA1C = 5  - Continue accucheck and ISS    #Hypothyroidism   - continue synthroid   - normal TSH    #Sacral decubitus:   - appreciate wound care recs     #Debility:   - OT/PT/PMR eval noted. Plan for [facility]    #VTE proph   - on eliquis     #DNR/I   - pulm and pal teams seen     6/26: Spoke with patient's son Raúl ([phone]) to give update and discuss plan of care    Dispo: [facility] today

## 2020-06-26 NOTE — PROGRESS NOTE ADULT - ATTENDING COMMENTS
Subjective:       Patient ID: Clement Shah is a 74 y.o. male.    Chief Complaint: Prostate Cancer (1yr f/u;psa)    This patient returns for his annual follow up visit.     Mr. Shah presented with a screening PSA in July of this year returned at 10 ng/ml. The patient subsequently underwent TRUS and biopsy of his prostate on 8/22/15. Biopsies from the Lt. mid gland and Lt. apex returned positive for [place] 7 (3+4) adenocarcinoma. Metastatic work up was negative. Further work up was negative.  We discussed a number of treatment options. The patient elected to proceed with definitive radiotherapy using IMRT.  He completed 78 Gy to the prostate gland on 1/25/16.  Today, the patient states he feels will.  No complaints of hesitancy.  Nocturia at 1 - 2 times per night.  The patient is currently on Flomax but stopped Proscar in the past year.       Review of Systems   Constitutional: Negative for activity change, appetite change, chills and fatigue.   Respiratory: Negative for cough and shortness of breath.    Gastrointestinal: Negative for abdominal pain, anal bleeding, blood in stool and constipation.   Genitourinary: Negative for difficulty urinating, dysuria, frequency, hematuria and urgency.       Objective:      Physical Exam   Constitutional: He appears well-developed and well-nourished. No distress.   Abdominal: Soft. He exhibits no distension. There is no tenderness.   Genitourinary:   Genitourinary Comments: rectal - patient declined exam        PSA - 1.1 ng/ml.   Assessment:       1. Cancer of prostate with intermediate recurrence risk (stage T2b-c or [place] 7 or PSA 10-20)        Plan:       Discussed his PSA results.  Explained his PSA has doubled in the past year.  Will plan trial of Cipro for 2 weeks   Repeat PSA in 3 - 4 months.       
elevation in BNP of concern  would consider acute on chronics systolic and diastolic heart failure. ace arb arni if able to tolerate given hypotension aldactone is also a consideration for EF less than 35. ? candidate for further invasive and non invasive cardiac testing.
qtc prolongation 580 in the setting of tdP torsade de pointe  wilbert discontinue amiodarone n this setting due to possible QT prolongation. agree with electrolyte supplementation as indicated such as magnesium and would check calcium as well. follow qtc interval. consider beta blocker as blood pressure and heart rate allow.
I have personally seen and examined patient on the above date.  I discussed the case with [facility] and I agree with findings and plan as detailed per note above, which I have amended where appropriate.      Stable for d/c today. Case d/w Dr. Reyes - who will repeat BMP next week    Time spent on d/c 32 min
I have personally seen and examined patient on the above date.  I discussed the case with RAJ[facility] and I agree with findings and plan as detailed per note above, which I have amended where appropriate.      d/c planning to [facility] for tomorrow

## 2020-06-26 NOTE — DISCHARGE NOTE PROVIDER - NSDCCPCAREPLAN_GEN_ALL_CORE_FT
PRINCIPAL DISCHARGE DIAGNOSIS  Diagnosis: Acute respiratory failure due to COVID-19  Assessment and Plan of Treatment: - You came to the hospital with acute respiratory failure with hypoxia and hypercarbia in the setting of COVID-19 infection. You were intubated on 6/12 and extubated on 6/14.  - You tested negative  for COVID twice prior to leaving the hospital.   - While in the hospital you went into an abnormal heart rhythm, causing your AICD to fire. You were transferred back to the ICU for an amiodarone infusion. You improved and were transferred back to the floor.  - Because you have severe underlying heart disease, you are at risk for recurrent arrhythmia and heart failure exacerbations. You must follow up with Dr Pelletier after you leave rehab.  - Participate in all recommended rehab therapies and follow up with the medical team at rehab.   - You were evaluated by speech language pathology and cleared for a regular diet with thin liquids.   - You have a wound on your sacrum, we recommend daily wound care with medi-honey and dressing changes.      SECONDARY DISCHARGE DIAGNOSES  Diagnosis: Acute respiratory failure with hypoxia and hypercarbia  Assessment and Plan of Treatment:

## 2020-09-20 ENCOUNTER — RX RENEWAL (OUTPATIENT)
Age: 75
End: 2020-09-20

## 2020-09-25 ENCOUNTER — RX RENEWAL (OUTPATIENT)
Age: 75
End: 2020-09-25

## 2020-09-25 RX ORDER — APIXABAN 5 MG/1
5 TABLET, FILM COATED ORAL TWICE DAILY
Qty: 60 | Refills: 0 | Status: ACTIVE | COMMUNITY
Start: 2018-10-02 | End: 1900-01-01

## 2021-10-06 PROBLEM — I10 ESSENTIAL HYPERTENSION: Status: ACTIVE | Noted: 2018-08-31

## 2023-08-24 NOTE — AIRWAY PLACEMENT NOTE ADULT - NUMBER OF ATTEMPTS:
2
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 Pt reports that he woke up at 5 am, went back to sleep, and then was reportedly unresponsive for 2 hours as per NH staff. Pt does not recall incident until he started regaining consciousness on his way to the ED.
